# Patient Record
Sex: FEMALE | Race: WHITE | NOT HISPANIC OR LATINO | Employment: OTHER | URBAN - METROPOLITAN AREA
[De-identification: names, ages, dates, MRNs, and addresses within clinical notes are randomized per-mention and may not be internally consistent; named-entity substitution may affect disease eponyms.]

---

## 2017-01-20 ENCOUNTER — CLINICAL SUPPORT (OUTPATIENT)
Dept: ORTHOPEDIC SURGERY | Facility: CLINIC | Age: 82
End: 2017-01-20

## 2017-01-20 VITALS — BODY MASS INDEX: 29.73 KG/M2 | WEIGHT: 185 LBS | TEMPERATURE: 98.3 F | HEIGHT: 66 IN

## 2017-01-20 DIAGNOSIS — M19.90 ARTHRITIS: Primary | ICD-10-CM

## 2017-01-20 PROCEDURE — 20610 DRAIN/INJ JOINT/BURSA W/O US: CPT | Performed by: ORTHOPAEDIC SURGERY

## 2017-01-20 RX ORDER — METHYLPREDNISOLONE ACETATE 80 MG/ML
80 INJECTION, SUSPENSION INTRA-ARTICULAR; INTRALESIONAL; INTRAMUSCULAR; SOFT TISSUE
Status: COMPLETED | OUTPATIENT
Start: 2017-01-20 | End: 2017-01-20

## 2017-01-20 RX ORDER — BUPIVACAINE HYDROCHLORIDE 5 MG/ML
4 INJECTION, SOLUTION PERINEURAL
Status: COMPLETED | OUTPATIENT
Start: 2017-01-20 | End: 2017-01-20

## 2017-01-20 RX ADMIN — METHYLPREDNISOLONE ACETATE 80 MG: 80 INJECTION, SUSPENSION INTRA-ARTICULAR; INTRALESIONAL; INTRAMUSCULAR; SOFT TISSUE at 15:04

## 2017-01-20 RX ADMIN — BUPIVACAINE HYDROCHLORIDE 4 ML: 5 INJECTION, SOLUTION PERINEURAL at 15:05

## 2017-01-20 RX ADMIN — METHYLPREDNISOLONE ACETATE 80 MG: 80 INJECTION, SUSPENSION INTRA-ARTICULAR; INTRALESIONAL; INTRAMUSCULAR; SOFT TISSUE at 15:05

## 2017-01-20 RX ADMIN — BUPIVACAINE HYDROCHLORIDE 4 ML: 5 INJECTION, SOLUTION PERINEURAL at 15:04

## 2017-01-20 NOTE — MR AVS SNAPSHOT
Brandy Han   1/20/2017 2:30 PM   Clinical Support    Dept Phone:  [phone]   Encounter #:  [id number]    Provider:  Jeanna Buckner MD   Department:  [facility]                Your Full Care Plan              Today's Medication Changes          These changes are accurate as of: 1/20/17  3:16 PM.  If you have any questions, ask your nurse or doctor.               Medication(s)that have changed:     * digoxin 250 MCG tablet   Commonly known as:  LANOXIN   Take 1 tablet by mouth Daily.   What changed:  how much to take   Changed by:  Rupal Sutton MD       * DIGOX 250 MCG tablet   Generic drug:  digoxin   TAKE 1 TABLET BY MOUTH EVERY DAY   What changed:  Another medication with the same name was changed. Make sure you understand how and when to take each.   Changed by:  Rupal Sutton MD       metoprolol succinate XL 50 MG 24 hr tablet   Commonly known as:  TOPROL-XL   Take 1 tablet by mouth Daily.   What changed:  Another medication with the same name was removed. Continue taking this medication, and follow the directions you see here.   Changed by:  Rupal Sutton MD       * Notice:  This list has 2 medication(s) that are the same as other medications prescribed for you. Read the directions carefully, and ask your doctor or other care provider to review them with you.               Your Updated Medication List          This list is accurate as of: 1/20/17  3:16 PM.  Always use your most recent med list.                atorvastatin 10 MG tablet   Commonly known as:  LIPITOR   Take 1 tablet by mouth Daily.       * digoxin 250 MCG tablet   Commonly known as:  LANOXIN   Take 1 tablet by mouth Daily.       * DIGOX 250 MCG tablet   Generic drug:  digoxin   TAKE 1 TABLET BY MOUTH EVERY DAY       metoprolol succinate XL 50 MG 24 hr tablet   Commonly known as:  TOPROL-XL   Take 1 tablet by mouth Daily.       triamcinolone 0.1 %  ointment   Commonly known as:  KENALOG       warfarin 5 MG tablet   Commonly known as:  COUMADIN   Take 1 tablet by mouth Daily.       * Notice:  This list has 2 medication(s) that are the same as other medications prescribed for you. Read the directions carefully, and ask your doctor or other care provider to review them with you.            We Performed the Following     Large Joint Arthrocentesis     Large Joint Arthrocentesis       Medications to be Given to You by a Medical Professional     Due       Frequency    2016 cyanocobalamin injection 1,000 mcg  Every 28 Days      Instructions     None    Patient Instructions History      Upcoming Appointments     Visit Type Date Time Department    INJECTION 2017  2:30 PM [facility]    INJECTION 2017  3:30 PM [facility]      [employer] Signup     [employer] allows you to send messages to your doctor, view your test results, renew your prescriptions, schedule appointments, and more. To sign up, go to [employer] and click on the Sign Up Now link in the New User? box. Enter your [employer] Activation Code exactly as it appears below along with the last four digits of your Social Security Number and your Date of Birth () to complete the sign-up process. If you do not sign up before the expiration date, you must request a new code.    Gig[employer] Activation Code: [email or web address]  Expires: 2/3/2017  3:16 PM    If you have questions, you can email [email or web address] or call [phone] to talk to our [employer] staff. Remember, [employer]lete is NOT to be used for urgent needs. For medical emergencies, dial 911.               Other Info from Your Visit           Your Appointments     2017  3:30 PM EDT   Injection with Jeanna Buckner MD   [facility] [place] [facility] (--)    [address] [place] [address]   [phone]              Allergies     No Known Allergies     "  Reason for Visit     Left Knee - Follow-up     Right Knee - Follow-up           Vital Signs     Temperature Height Weight Body Mass Index Smoking Status       98.3 °F (36.8 °C) 66\" (167.6 cm) 185 lb (83.9 kg) 29.86 kg/m2 Never Smoker         "

## 2017-01-20 NOTE — PROGRESS NOTES
"Bilateral Knee Joint Injections      Patient: Brandy Han        YOB: 1930            Chief Complaints: Knee pain      History of Present Illness: Pt gets intermittent  injections with good relief. Is here for repeat injection. Understands options.      Physical Exam: 86 y.o. female  General Appearance:    Alert, cooperative, in no acute distress                   Vitals:    01/20/17 1500   Temp: 98.3 °F (36.8 °C)   Weight: 185 lb (83.9 kg)   Height: 66\" (167.6 cm)      Patient is alert and read ×3 no acute distress appears her above-listed at height weight and age.  Affect is normal respiratory rate is normal unlabored. Heart rate regular rate rhythm, sclera, dentition and hearing are normal for the purpose of this exam.  Exam and complaints are unchanged.      Procedure:  Large Joint Arthrocentesis  Date/Time: 1/20/2017 3:04 PM  Consent given by: patient  Site marked: site marked  Supporting Documentation  Indications: pain   Procedure Details  Location: knee - R knee  Needle size: 25 G  Medications administered: 4 mL bupivacaine; 80 mg methylPREDNISolone acetate 80 MG/ML      Large Joint Arthrocentesis  Date/Time: 1/20/2017 3:05 PM  Consent given by: patient  Site marked: site marked  Supporting Documentation  Indications: pain   Procedure Details  Location: knee - L knee  Needle size: 25 G  Medications administered: 4 mL bupivacaine; 80 mg methylPREDNISolone acetate 80 MG/ML  Patient tolerance: patient tolerated the procedure well with no immediate complications                Assessment. Persistent knee pain      Plan: Is to proceed with injection    The knee joint was injected under strict sterile technique with Marcaine and Depo-Medrol this was done sterilely and tolerated tolerated well.            "

## 2017-02-16 ENCOUNTER — ANTICOAGULATION VISIT (OUTPATIENT)
Dept: INTERNAL MEDICINE | Facility: CLINIC | Age: 82
End: 2017-02-16

## 2017-02-16 DIAGNOSIS — I48.91 ATRIAL FIBRILLATION, UNSPECIFIED TYPE (HCC): Primary | ICD-10-CM

## 2017-02-16 LAB — INR PPP: 1.4 (ref 0.9–1.1)

## 2017-02-16 PROCEDURE — 85610 PROTHROMBIN TIME: CPT | Performed by: INTERNAL MEDICINE

## 2017-02-16 PROCEDURE — 36416 COLLJ CAPILLARY BLOOD SPEC: CPT | Performed by: INTERNAL MEDICINE

## 2017-02-24 ENCOUNTER — ANTICOAGULATION VISIT (OUTPATIENT)
Dept: INTERNAL MEDICINE | Facility: CLINIC | Age: 82
End: 2017-02-24

## 2017-02-24 DIAGNOSIS — I48.91 ATRIAL FIBRILLATION, UNSPECIFIED TYPE (HCC): ICD-10-CM

## 2017-02-24 LAB — INR PPP: 1.9 (ref 0.9–1.1)

## 2017-02-24 PROCEDURE — 85610 PROTHROMBIN TIME: CPT | Performed by: INTERNAL MEDICINE

## 2017-02-24 PROCEDURE — 36416 COLLJ CAPILLARY BLOOD SPEC: CPT | Performed by: INTERNAL MEDICINE

## 2017-03-07 ENCOUNTER — TELEPHONE (OUTPATIENT)
Dept: ORTHOPEDIC SURGERY | Facility: CLINIC | Age: 82
End: 2017-03-07

## 2017-03-27 RX ORDER — DIGOXIN 250 UG/1
TABLET ORAL
Qty: 90 TABLET | Refills: 0 | Status: SHIPPED | OUTPATIENT
Start: 2017-03-27 | End: 2018-03-12 | Stop reason: SDUPTHER

## 2017-03-31 ENCOUNTER — ANTICOAGULATION VISIT (OUTPATIENT)
Dept: INTERNAL MEDICINE | Facility: CLINIC | Age: 82
End: 2017-03-31

## 2017-03-31 DIAGNOSIS — I48.91 ATRIAL FIBRILLATION, UNSPECIFIED TYPE (HCC): ICD-10-CM

## 2017-03-31 LAB — INR PPP: 2.3 (ref 0.9–1.1)

## 2017-03-31 PROCEDURE — 36416 COLLJ CAPILLARY BLOOD SPEC: CPT | Performed by: INTERNAL MEDICINE

## 2017-03-31 PROCEDURE — 85610 PROTHROMBIN TIME: CPT | Performed by: INTERNAL MEDICINE

## 2017-04-25 ENCOUNTER — CLINICAL SUPPORT (OUTPATIENT)
Dept: ORTHOPEDIC SURGERY | Facility: CLINIC | Age: 82
End: 2017-04-25

## 2017-04-25 VITALS — WEIGHT: 185 LBS | HEIGHT: 66 IN | BODY MASS INDEX: 29.73 KG/M2

## 2017-04-25 DIAGNOSIS — G89.29 CHRONIC PAIN OF BOTH KNEES: Primary | ICD-10-CM

## 2017-04-25 DIAGNOSIS — M25.562 CHRONIC PAIN OF BOTH KNEES: Primary | ICD-10-CM

## 2017-04-25 DIAGNOSIS — M25.561 CHRONIC PAIN OF BOTH KNEES: Primary | ICD-10-CM

## 2017-04-25 PROCEDURE — 73562 X-RAY EXAM OF KNEE 3: CPT | Performed by: ORTHOPAEDIC SURGERY

## 2017-04-25 PROCEDURE — 20610 DRAIN/INJ JOINT/BURSA W/O US: CPT | Performed by: ORTHOPAEDIC SURGERY

## 2017-04-25 RX ORDER — METHYLPREDNISOLONE ACETATE 80 MG/ML
80 INJECTION, SUSPENSION INTRA-ARTICULAR; INTRALESIONAL; INTRAMUSCULAR; SOFT TISSUE
Status: COMPLETED | OUTPATIENT
Start: 2017-04-25 | End: 2017-04-25

## 2017-04-25 RX ORDER — BUPIVACAINE HYDROCHLORIDE 5 MG/ML
4 INJECTION, SOLUTION EPIDURAL; INTRACAUDAL
Status: COMPLETED | OUTPATIENT
Start: 2017-04-25 | End: 2017-04-25

## 2017-04-25 RX ADMIN — METHYLPREDNISOLONE ACETATE 80 MG: 80 INJECTION, SUSPENSION INTRA-ARTICULAR; INTRALESIONAL; INTRAMUSCULAR; SOFT TISSUE at 16:02

## 2017-04-25 RX ADMIN — BUPIVACAINE HYDROCHLORIDE 4 ML: 5 INJECTION, SOLUTION EPIDURAL; INTRACAUDAL at 16:02

## 2017-04-25 NOTE — PROGRESS NOTES
"Right Knee Joint Injection      Patient: Brandy Han        YOB: 1930            Chief Complaints: Right Knee pain  Chief Complaint   Patient presents with   • Left Knee - Follow-up   • Right Knee - Follow-up           History of Present Illness: Pt gets intermittent  injections with good relief. Is here for repeat injection. Understands options.She's miserable and wants to know if there any other steps we can take      Physical Exam: 86 y.o. female  General Appearance:    Alert, cooperative, in no acute distress                   Vitals:    04/25/17 1600   Weight: 185 lb (83.9 kg)   Height: 66\" (167.6 cm)      Patient is alert and read ×3 no acute distress appears her above-listed at height weight and age.  Affect is normal respiratory rate is normal unlabored. Heart rate regular rate rhythm, sclera, dentition and hearing are normal for the purpose of this exam.  Exam and complaints are unchanged.  Physical exam of the right  knee reveals no effusion no redness.  The patient does have tenderness about the medial l joint line.  No tenderness about the lateral l joint line.  A negative bounce home and a positive l medial Shirlene.    Patient has a stable ligamentous exam.  The patient has a negative Lachman and negative anterior drawer and a negative pivot shift.  Quads are reasonable and symmetric bilaterally.  Calf is soft and nontender.  There is no overlying skin changes no lymphedema lymphadenopathy.  Patient has good hip range of motion full symmetric and asymptomatic and a normal ankle exam.  She has good distal pulses and sensation distally is intact        Procedure:  Large Joint Arthrocentesis  Date/Time: 4/25/2017 4:02 PM  Consent given by: patient  Site marked: site marked  Timeout: Immediately prior to procedure a time out was called to verify the correct patient, procedure, equipment, support staff and site/side marked as required   Supporting Documentation  Indications: pain   Procedure " Details  Location: knee - R knee  Needle size: 25 G  Approach: anteromedial  Medications administered: 4 mL bupivacaine (PF) (MARCAINE) 0.5 % injection 4 mL; 80 mg methylPREDNISolone acetate (DEPO-medrol) injection 80 mg        X-rays AP lateral merchant view of both knees were taken to evaluate her symptoms and compared to previous films.  She has mild narrowing of her medial compartment on the left she has mild patellofemoral on OA on the left and severe patellofemoral OA on the right          Assessment. Persistent knee pain she has a lot of patellofemoral away I'm just concerned that were missing something here plan is to proceed with an MRI and have her call me after that test.  I will proceed with a knee injection as well today  Plan: Is to proceed with injection    The knee joint was injected under strict sterile technique with Marcaine and Depo-Medrol this was done sterilely and tolerated tolerated well.

## 2017-04-26 ENCOUNTER — TELEPHONE (OUTPATIENT)
Dept: ORTHOPEDIC SURGERY | Facility: CLINIC | Age: 82
End: 2017-04-26

## 2017-04-26 DIAGNOSIS — M25.561 CHRONIC PAIN OF RIGHT KNEE: Primary | ICD-10-CM

## 2017-04-26 DIAGNOSIS — G89.29 CHRONIC PAIN OF RIGHT KNEE: Primary | ICD-10-CM

## 2017-04-28 ENCOUNTER — ANTICOAGULATION VISIT (OUTPATIENT)
Dept: INTERNAL MEDICINE | Facility: CLINIC | Age: 82
End: 2017-04-28

## 2017-04-28 DIAGNOSIS — I48.91 ATRIAL FIBRILLATION, UNSPECIFIED TYPE (HCC): Primary | ICD-10-CM

## 2017-04-28 LAB — INR PPP: 2.2 (ref 0.9–1.1)

## 2017-04-28 PROCEDURE — 85610 PROTHROMBIN TIME: CPT | Performed by: INTERNAL MEDICINE

## 2017-04-28 PROCEDURE — 36416 COLLJ CAPILLARY BLOOD SPEC: CPT | Performed by: INTERNAL MEDICINE

## 2017-05-05 ENCOUNTER — HOSPITAL ENCOUNTER (OUTPATIENT)
Dept: GENERAL RADIOLOGY | Facility: HOSPITAL | Age: 82
Discharge: HOME | End: 2017-05-05
Attending: ORTHOPAEDIC SURGERY | Admitting: ORTHOPAEDIC SURGERY

## 2017-05-05 ENCOUNTER — HOSPITAL ENCOUNTER (OUTPATIENT)
Dept: CT IMAGING | Facility: HOSPITAL | Age: 82
Discharge: HOME | End: 2017-05-05
Attending: ORTHOPAEDIC SURGERY

## 2017-05-05 DIAGNOSIS — M25.561 CHRONIC PAIN OF RIGHT KNEE: ICD-10-CM

## 2017-05-05 DIAGNOSIS — G89.29 CHRONIC PAIN OF RIGHT KNEE: ICD-10-CM

## 2017-05-05 PROCEDURE — 73700 CT LOWER EXTREMITY W/O DYE: CPT

## 2017-05-05 PROCEDURE — 77002 NEEDLE LOCALIZATION BY XRAY: CPT

## 2017-05-05 PROCEDURE — 0 IOPAMIDOL 61 % SOLUTION: Performed by: ORTHOPAEDIC SURGERY

## 2017-05-05 RX ORDER — LIDOCAINE HYDROCHLORIDE 10 MG/ML
10 INJECTION, SOLUTION INFILTRATION; PERINEURAL ONCE
Status: COMPLETED | OUTPATIENT
Start: 2017-05-05 | End: 2017-05-05

## 2017-05-05 RX ADMIN — LIDOCAINE HYDROCHLORIDE 6 ML: 10 INJECTION, SOLUTION INFILTRATION; PERINEURAL at 14:39

## 2017-05-05 RX ADMIN — IOPAMIDOL 20 ML: 612 INJECTION, SOLUTION INTRAVENOUS at 14:39

## 2017-06-23 ENCOUNTER — ANTICOAGULATION VISIT (OUTPATIENT)
Dept: INTERNAL MEDICINE | Facility: CLINIC | Age: 82
End: 2017-06-23

## 2017-06-23 ENCOUNTER — OFFICE VISIT (OUTPATIENT)
Dept: INTERNAL MEDICINE | Facility: CLINIC | Age: 82
End: 2017-06-23

## 2017-06-23 VITALS
OXYGEN SATURATION: 92 % | WEIGHT: 190 LBS | BODY MASS INDEX: 30.53 KG/M2 | HEART RATE: 94 BPM | DIASTOLIC BLOOD PRESSURE: 78 MMHG | HEIGHT: 66 IN | SYSTOLIC BLOOD PRESSURE: 132 MMHG

## 2017-06-23 DIAGNOSIS — I48.91 ATRIAL FIBRILLATION, UNSPECIFIED TYPE (HCC): ICD-10-CM

## 2017-06-23 DIAGNOSIS — E78.5 HYPERLIPIDEMIA, UNSPECIFIED HYPERLIPIDEMIA TYPE: Primary | ICD-10-CM

## 2017-06-23 DIAGNOSIS — J20.9 ACUTE BRONCHITIS, UNSPECIFIED ORGANISM: ICD-10-CM

## 2017-06-23 DIAGNOSIS — I73.9 PERIPHERAL VASCULAR DISEASE (HCC): ICD-10-CM

## 2017-06-23 DIAGNOSIS — E53.8 B12 DEFICIENCY: ICD-10-CM

## 2017-06-23 LAB — INR PPP: 3.1 (ref 0.9–1.1)

## 2017-06-23 PROCEDURE — 36416 COLLJ CAPILLARY BLOOD SPEC: CPT | Performed by: INTERNAL MEDICINE

## 2017-06-23 PROCEDURE — 96372 THER/PROPH/DIAG INJ SC/IM: CPT | Performed by: INTERNAL MEDICINE

## 2017-06-23 PROCEDURE — 85610 PROTHROMBIN TIME: CPT | Performed by: INTERNAL MEDICINE

## 2017-06-23 PROCEDURE — 99214 OFFICE O/P EST MOD 30 MIN: CPT | Performed by: INTERNAL MEDICINE

## 2017-06-23 RX ORDER — AMOXICILLIN AND CLAVULANATE POTASSIUM 875; 125 MG/1; MG/1
1 TABLET, FILM COATED ORAL 2 TIMES DAILY
Qty: 20 TABLET | Refills: 0 | Status: SHIPPED | OUTPATIENT
Start: 2017-06-23 | End: 2017-07-03

## 2017-06-23 RX ORDER — TRIAMCINOLONE ACETONIDE 1 MG/G
CREAM TOPICAL
Refills: 2 | COMMUNITY
Start: 2017-05-17 | End: 2020-08-21

## 2017-06-23 RX ADMIN — CYANOCOBALAMIN 1000 MCG: 1000 INJECTION, SOLUTION INTRAMUSCULAR; SUBCUTANEOUS at 16:55

## 2017-06-23 NOTE — PROGRESS NOTES
Subjective     Brandy Han is a 87 y.o. female who presents with   Chief Complaint   Patient presents with   • Hyperlipidemia   • Generalized Body Aches       History of Present Illness     Just started with a cough.  No fever.  Going on a week.    She is having a lot of joint pain and back pain.  Right knee.  Her low back bothers her.  Her shoulder ache as well.  She is followed by Dr. Buckner and Dr. Pride for her joints.  Discussed taking tylenol regularly up to TID.    Due for INR.  She is overdue.    Due for B12 shot.  She is overdue.    HLD.  Due for check of labs.   H/o PVD with one totally occluded carotid.      Review of Systems    The following portions of the patient's history were reviewed and updated as appropriate: allergies, current medications and problem list.    Patient Active Problem List    Diagnosis Date Noted   • Chronic pain of both knees 04/25/2017   • Arthritis 01/20/2017   • B12 deficiency 11/04/2016   • Sinus arrest 10/04/2016     Note Last Updated: 10/4/2016     Overview:   per Cardionet Monitor July 2013; s/p pacemaker     • Presence of cardiac pacemaker 10/04/2016   • Right shoulder pain 07/15/2016   • H/O fall 07/15/2016   • Lumbar spine pain 07/15/2016   • Glenohumeral arthritis 07/15/2016   • Right-sided low back pain with right-sided sciatica 07/15/2016   • Venous thrombosis 06/14/2016   • Peripheral vascular disease 06/14/2016     Note Last Updated: 6/14/2016     Description: total occlusion of the SOLO and stenosis of the LICA.     • Osteoporosis 06/14/2016     Note Last Updated: 6/14/2016     Description: s/p 10 years of Fosamax.     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 90 tablet 3   • DIGOX 250 MCG tablet TAKE 1 TABLET BY MOUTH EVERY DAY 90 tablet 0   • metoprolol succinate XL (TOPROL-XL) 50 MG 24 hr tablet Take 1 tablet by mouth Daily. 90 tablet 3   • warfarin (COUMADIN) 5 MG tablet Take 1 tablet by mouth Daily. 90 tablet 3   • [DISCONTINUED] digoxin (LANOXIN) 250 MCG tablet Take 1 tablet by mouth Daily. (Patient taking differently: Take 0.25 mcg by mouth Daily.) 90 tablet 3   • [DISCONTINUED] triamcinolone (KENALOG) 0.1 % ointment        Current Facility-Administered Medications on File Prior to Visit   Medication Dose Route Frequency Provider Last Rate Last Dose   • cyanocobalamin injection 1,000 mcg  1,000 mcg Intramuscular Q28 Days Rupal Sutton MD   1,000 mcg at 06/23/17 1655       Objective     /78  Pulse 94  Ht 66\" (167.6 cm)  Wt 190 lb (86.2 kg)  SpO2 92%  BMI 30.67 kg/m2    Physical Exam   Constitutional: She is oriented to person, place, and time. She appears well-developed and well-nourished.   HENT:   Head: Normocephalic and atraumatic.   Right Ear: Hearing and tympanic membrane normal.   Left Ear: Hearing and tympanic membrane normal.   Mouth/Throat: No oropharyngeal exudate or posterior oropharyngeal erythema.   Cardiovascular: Normal rate, regular rhythm and normal heart sounds.    Pulmonary/Chest: Effort normal and breath sounds normal.   Neurological: She is alert and oriented to person, place, and time.   Skin: Skin is warm and dry.   Psychiatric: She has a normal mood and affect. Her behavior is normal.     X-rays of the chest  performed today for following indication:   cough.  X-ray reveal nad.  There is no available x-ray for comparison.  X-ray sent to radiology for official interpretation and findings.        Assessment/Plan   Brandy was seen today for hyperlipidemia and generalized body aches.    Diagnoses and all orders for this visit:    Hyperlipidemia, unspecified hyperlipidemia type  -     CBC & Differential  -     " Comprehensive Metabolic Panel  -     Lipid Panel  -     TSH Rfx On Abnormal To Free T4  -     Vitamin B12    B12 deficiency  -     CBC & Differential  -     Comprehensive Metabolic Panel  -     Lipid Panel  -     TSH Rfx On Abnormal To Free T4  -     Vitamin B12    Atrial fibrillation, unspecified type  -     CBC & Differential  -     Comprehensive Metabolic Panel  -     Lipid Panel  -     TSH Rfx On Abnormal To Free T4  -     Vitamin B12  -     POC INR    Acute bronchitis, unspecified organism  -     CBC & Differential  -     Comprehensive Metabolic Panel  -     Lipid Panel  -     TSH Rfx On Abnormal To Free T4  -     Vitamin B12  -     XR Chest PA & Lateral    Peripheral vascular disease  -     CBC & Differential  -     Comprehensive Metabolic Panel  -     Lipid Panel  -     TSH Rfx On Abnormal To Free T4  -     Vitamin B12    Other orders  -     amoxicillin-clavulanate (AUGMENTIN) 875-125 MG per tablet; Take 1 tablet by mouth 2 (Two) Times a Day for 10 days.        Discussion  HLD/PVD.  Continue current and check labs.   Afib.  See flowsheet.   B12 deficiency.  Shot today.    Patient presents with episodes of acute bronchitis.  A prescription for antibiotics is provided today.  The patient is instructed to take along with Mucinex DM.  Let me know they are not feeling better over the next 3 days or if there is any change in symptoms.               Future Appointments  Date Time Provider Department Center   7/28/2017 2:30 PM [facility] None   9/25/2017 3:15 PM Rupal Sutton MD [facility] None

## 2017-06-24 LAB
ALBUMIN SERPL-MCNC: 4.4 G/DL (ref 3.5–5.2)
ALBUMIN/GLOB SERPL: 1.5 G/DL
ALP SERPL-CCNC: 88 U/L (ref 39–117)
ALT SERPL-CCNC: 10 U/L (ref 1–33)
AST SERPL-CCNC: 15 U/L (ref 1–32)
BASOPHILS # BLD AUTO: 0.02 10*3/MM3 (ref 0–0.2)
BASOPHILS NFR BLD AUTO: 0.2 % (ref 0–1.5)
BILIRUB SERPL-MCNC: 1.2 MG/DL (ref 0.1–1.2)
BUN SERPL-MCNC: 13 MG/DL (ref 8–23)
BUN/CREAT SERPL: 13 (ref 7–25)
CALCIUM SERPL-MCNC: 9 MG/DL (ref 8.6–10.5)
CHLORIDE SERPL-SCNC: 100 MMOL/L (ref 98–107)
CHOLEST SERPL-MCNC: 147 MG/DL (ref 0–200)
CO2 SERPL-SCNC: 23.8 MMOL/L (ref 22–29)
CREAT SERPL-MCNC: 1 MG/DL (ref 0.57–1)
EOSINOPHIL # BLD AUTO: 0.11 10*3/MM3 (ref 0–0.7)
EOSINOPHIL NFR BLD AUTO: 1.1 % (ref 0.3–6.2)
ERYTHROCYTE [DISTWIDTH] IN BLOOD BY AUTOMATED COUNT: 12.6 % (ref 11.7–13)
GLOBULIN SER CALC-MCNC: 2.9 GM/DL
GLUCOSE SERPL-MCNC: 122 MG/DL (ref 65–99)
HCT VFR BLD AUTO: 44.7 % (ref 35.6–45.5)
HDLC SERPL-MCNC: 74 MG/DL (ref 40–60)
HGB BLD-MCNC: 14.7 G/DL (ref 11.9–15.5)
IMM GRANULOCYTES # BLD: 0.02 10*3/MM3 (ref 0–0.03)
IMM GRANULOCYTES NFR BLD: 0.2 % (ref 0–0.5)
LDLC SERPL CALC-MCNC: 52 MG/DL (ref 0–100)
LYMPHOCYTES # BLD AUTO: 2.08 10*3/MM3 (ref 0.9–4.8)
LYMPHOCYTES NFR BLD AUTO: 20.2 % (ref 19.6–45.3)
MCH RBC QN AUTO: 32.6 PG (ref 26.9–32)
MCHC RBC AUTO-ENTMCNC: 32.9 G/DL (ref 32.4–36.3)
MCV RBC AUTO: 99.1 FL (ref 80.5–98.2)
MONOCYTES # BLD AUTO: 1.14 10*3/MM3 (ref 0.2–1.2)
MONOCYTES NFR BLD AUTO: 11.1 % (ref 5–12)
NEUTROPHILS # BLD AUTO: 6.93 10*3/MM3 (ref 1.9–8.1)
NEUTROPHILS NFR BLD AUTO: 67.2 % (ref 42.7–76)
PLATELET # BLD AUTO: 183 10*3/MM3 (ref 140–500)
POTASSIUM SERPL-SCNC: 4 MMOL/L (ref 3.5–5.2)
PROT SERPL-MCNC: 7.3 G/DL (ref 6–8.5)
RBC # BLD AUTO: 4.51 10*6/MM3 (ref 3.9–5.2)
SODIUM SERPL-SCNC: 140 MMOL/L (ref 136–145)
TRIGL SERPL-MCNC: 106 MG/DL (ref 0–150)
TSH SERPL DL<=0.005 MIU/L-ACNC: 1.09 MIU/ML (ref 0.27–4.2)
VIT B12 SERPL-MCNC: 234 PG/ML (ref 211–946)
VLDLC SERPL CALC-MCNC: 21.2 MG/DL (ref 5–40)
WBC # BLD AUTO: 10.3 10*3/MM3 (ref 4.5–10.7)

## 2017-06-26 ENCOUNTER — TELEPHONE (OUTPATIENT)
Dept: INTERNAL MEDICINE | Facility: CLINIC | Age: 82
End: 2017-06-26

## 2017-06-26 RX ORDER — ATORVASTATIN CALCIUM 20 MG/1
20 TABLET, FILM COATED ORAL DAILY
Qty: 90 TABLET | Refills: 3 | Status: SHIPPED | OUTPATIENT
Start: 2017-06-26 | End: 2017-11-27 | Stop reason: SDUPTHER

## 2017-06-26 RX ORDER — BENZONATATE 200 MG/1
200 CAPSULE ORAL 3 TIMES DAILY PRN
Qty: 30 CAPSULE | Refills: 0 | Status: SHIPPED | OUTPATIENT
Start: 2017-06-26 | End: 2017-11-27

## 2017-06-26 NOTE — TELEPHONE ENCOUNTER
Has a bad cough robitussin isn't working can you call in a script. CC    I called in Phillip Miranda.  Let me know if that is not helpful.  HARRYW

## 2017-07-28 ENCOUNTER — ANTICOAGULATION VISIT (OUTPATIENT)
Dept: INTERNAL MEDICINE | Facility: CLINIC | Age: 82
End: 2017-07-28

## 2017-07-28 ENCOUNTER — LAB (OUTPATIENT)
Dept: INTERNAL MEDICINE | Facility: CLINIC | Age: 82
End: 2017-07-28

## 2017-07-28 DIAGNOSIS — I48.91 ATRIAL FIBRILLATION, UNSPECIFIED TYPE (HCC): ICD-10-CM

## 2017-07-28 LAB — INR PPP: 4.2 (ref 0.9–1.1)

## 2017-07-28 PROCEDURE — 96372 THER/PROPH/DIAG INJ SC/IM: CPT | Performed by: INTERNAL MEDICINE

## 2017-07-28 PROCEDURE — 85610 PROTHROMBIN TIME: CPT | Performed by: INTERNAL MEDICINE

## 2017-07-28 PROCEDURE — 36416 COLLJ CAPILLARY BLOOD SPEC: CPT | Performed by: INTERNAL MEDICINE

## 2017-07-28 RX ADMIN — CYANOCOBALAMIN 1000 MCG: 1000 INJECTION, SOLUTION INTRAMUSCULAR; SUBCUTANEOUS at 14:37

## 2017-08-11 ENCOUNTER — ANTICOAGULATION VISIT (OUTPATIENT)
Dept: INTERNAL MEDICINE | Facility: CLINIC | Age: 82
End: 2017-08-11

## 2017-08-11 DIAGNOSIS — I48.91 ATRIAL FIBRILLATION, UNSPECIFIED TYPE (HCC): ICD-10-CM

## 2017-08-11 LAB — INR PPP: 2.5 (ref 0.9–1.1)

## 2017-08-11 PROCEDURE — 36416 COLLJ CAPILLARY BLOOD SPEC: CPT | Performed by: INTERNAL MEDICINE

## 2017-08-11 PROCEDURE — 85610 PROTHROMBIN TIME: CPT | Performed by: INTERNAL MEDICINE

## 2017-08-22 ENCOUNTER — CLINICAL SUPPORT (OUTPATIENT)
Dept: ORTHOPEDIC SURGERY | Facility: CLINIC | Age: 82
End: 2017-08-22

## 2017-08-22 VITALS — HEIGHT: 66 IN | TEMPERATURE: 98.2 F

## 2017-08-22 DIAGNOSIS — M19.90 ARTHRITIS: Primary | ICD-10-CM

## 2017-08-22 PROCEDURE — 20610 DRAIN/INJ JOINT/BURSA W/O US: CPT | Performed by: ORTHOPAEDIC SURGERY

## 2017-08-22 RX ORDER — BUPIVACAINE HYDROCHLORIDE 5 MG/ML
4 INJECTION, SOLUTION EPIDURAL; INTRACAUDAL
Status: COMPLETED | OUTPATIENT
Start: 2017-08-22 | End: 2017-08-22

## 2017-08-22 RX ORDER — METHYLPREDNISOLONE ACETATE 80 MG/ML
80 INJECTION, SUSPENSION INTRA-ARTICULAR; INTRALESIONAL; INTRAMUSCULAR; SOFT TISSUE
Status: COMPLETED | OUTPATIENT
Start: 2017-08-22 | End: 2017-08-22

## 2017-08-22 RX ADMIN — BUPIVACAINE HYDROCHLORIDE 4 ML: 5 INJECTION, SOLUTION EPIDURAL; INTRACAUDAL at 13:17

## 2017-08-22 RX ADMIN — METHYLPREDNISOLONE ACETATE 80 MG: 80 INJECTION, SUSPENSION INTRA-ARTICULAR; INTRALESIONAL; INTRAMUSCULAR; SOFT TISSUE at 13:17

## 2017-08-22 NOTE — PROGRESS NOTES
"Bilateral Knee Joint Injection      Patient: Brandy Han        YOB: 1930            Chief Complaints: Bilateral Knee pain  Chief Complaint   Patient presents with   • Left Knee - Follow-up   • Right Knee - Follow-up           History of Present Illness: Pt gets intermittent  injections with good relief. Is here for repeat injection. Understands options.      Physical Exam: 87 y.o. female  General Appearance:    Alert, cooperative, in no acute distress                   Vitals:    08/22/17 1316   Temp: 98.2 °F (36.8 °C)   TempSrc: Temporal Artery    Height: 66\" (167.6 cm)      Patient is alert and read ×3 no acute distress appears her above-listed at height weight and age.  Affect is normal respiratory rate is normal unlabored. Heart rate regular rate rhythm, sclera, dentition and hearing are normal for the purpose of this exam.  Exam and complaints are unchanged.      Procedure:  Large Joint Arthrocentesis  Date/Time: 8/22/2017 1:17 PM  Consent given by: patient  Site marked: site marked  Timeout: Immediately prior to procedure a time out was called to verify the correct patient, procedure, equipment, support staff and site/side marked as required   Supporting Documentation  Indications: pain   Procedure Details  Location: knee - R knee  Preparation: Patient was prepped and draped in the usual sterile fashion  Needle size: 25 G  Approach: anteromedial  Medications administered: 80 mg methylPREDNISolone acetate 80 MG/ML; 4 mL bupivacaine (PF) 0.5 %      Large Joint Arthrocentesis  Date/Time: 8/22/2017 1:17 PM  Consent given by: patient  Site marked: site marked  Timeout: Immediately prior to procedure a time out was called to verify the correct patient, procedure, equipment, support staff and site/side marked as required   Supporting Documentation  Indications: pain   Procedure Details  Location: knee - L knee  Preparation: Patient was prepped and draped in the usual sterile fashion  Needle size: 25 " G  Approach: anteromedial  Medications administered: 4 mL bupivacaine (PF) 0.5 %; 80 mg methylPREDNISolone acetate 80 MG/ML                  Assessment. Persistent knee pain      Plan: Is to proceed with injection    The knee joint was injected under strict sterile technique with Marcaine and Depo-Medrol this was done sterilely and tolerated tolerated well.  She would like to start some physical therapy I will put the order in today

## 2017-08-25 ENCOUNTER — ANTICOAGULATION VISIT (OUTPATIENT)
Dept: INTERNAL MEDICINE | Facility: CLINIC | Age: 82
End: 2017-08-25

## 2017-08-25 DIAGNOSIS — I48.91 ATRIAL FIBRILLATION, UNSPECIFIED TYPE (HCC): ICD-10-CM

## 2017-08-25 LAB — INR PPP: 4 (ref 0.9–1.1)

## 2017-08-25 PROCEDURE — 96372 THER/PROPH/DIAG INJ SC/IM: CPT | Performed by: INTERNAL MEDICINE

## 2017-08-25 PROCEDURE — 36416 COLLJ CAPILLARY BLOOD SPEC: CPT | Performed by: INTERNAL MEDICINE

## 2017-08-25 PROCEDURE — 85610 PROTHROMBIN TIME: CPT | Performed by: INTERNAL MEDICINE

## 2017-08-25 RX ADMIN — CYANOCOBALAMIN 1000 MCG: 1000 INJECTION, SOLUTION INTRAMUSCULAR; SUBCUTANEOUS at 14:32

## 2017-09-06 ENCOUNTER — APPOINTMENT (OUTPATIENT)
Dept: PHYSICAL THERAPY | Facility: HOSPITAL | Age: 82
End: 2017-09-06
Attending: ORTHOPAEDIC SURGERY

## 2017-09-08 ENCOUNTER — ANTICOAGULATION VISIT (OUTPATIENT)
Dept: INTERNAL MEDICINE | Facility: CLINIC | Age: 82
End: 2017-09-08

## 2017-09-08 DIAGNOSIS — I48.91 ATRIAL FIBRILLATION, UNSPECIFIED TYPE (HCC): ICD-10-CM

## 2017-09-08 LAB — INR PPP: 1.6 (ref 0.9–1.1)

## 2017-09-08 PROCEDURE — 85610 PROTHROMBIN TIME: CPT | Performed by: INTERNAL MEDICINE

## 2017-09-08 PROCEDURE — 36416 COLLJ CAPILLARY BLOOD SPEC: CPT | Performed by: INTERNAL MEDICINE

## 2017-09-25 ENCOUNTER — ANTICOAGULATION VISIT (OUTPATIENT)
Dept: INTERNAL MEDICINE | Facility: CLINIC | Age: 82
End: 2017-09-25

## 2017-09-25 ENCOUNTER — OFFICE VISIT (OUTPATIENT)
Dept: INTERNAL MEDICINE | Facility: CLINIC | Age: 82
End: 2017-09-25

## 2017-09-25 VITALS
HEART RATE: 75 BPM | WEIGHT: 180 LBS | BODY MASS INDEX: 28.93 KG/M2 | DIASTOLIC BLOOD PRESSURE: 66 MMHG | SYSTOLIC BLOOD PRESSURE: 128 MMHG | RESPIRATION RATE: 18 BRPM | OXYGEN SATURATION: 96 % | HEIGHT: 66 IN

## 2017-09-25 DIAGNOSIS — E78.5 HYPERLIPIDEMIA, UNSPECIFIED HYPERLIPIDEMIA TYPE: ICD-10-CM

## 2017-09-25 DIAGNOSIS — I48.91 ATRIAL FIBRILLATION, UNSPECIFIED TYPE (HCC): ICD-10-CM

## 2017-09-25 DIAGNOSIS — N30.00 ACUTE CYSTITIS WITHOUT HEMATURIA: Primary | ICD-10-CM

## 2017-09-25 DIAGNOSIS — I73.9 PERIPHERAL VASCULAR DISEASE (HCC): ICD-10-CM

## 2017-09-25 DIAGNOSIS — I82.90 VENOUS THROMBOSIS: ICD-10-CM

## 2017-09-25 PROBLEM — M19.90 ARTHRITIS: Status: RESOLVED | Noted: 2017-01-20 | Resolved: 2017-09-25

## 2017-09-25 LAB — INR PPP: 1.3 (ref 0.9–1.1)

## 2017-09-25 PROCEDURE — 36416 COLLJ CAPILLARY BLOOD SPEC: CPT | Performed by: INTERNAL MEDICINE

## 2017-09-25 PROCEDURE — 99213 OFFICE O/P EST LOW 20 MIN: CPT | Performed by: INTERNAL MEDICINE

## 2017-09-25 PROCEDURE — 85610 PROTHROMBIN TIME: CPT | Performed by: INTERNAL MEDICINE

## 2017-09-25 RX ORDER — CEFDINIR 300 MG/1
300 CAPSULE ORAL 2 TIMES DAILY
Qty: 20 CAPSULE | Refills: 0 | Status: SHIPPED | OUTPATIENT
Start: 2017-09-25 | End: 2017-09-25 | Stop reason: SDUPTHER

## 2017-09-25 RX ORDER — CEFDINIR 300 MG/1
300 CAPSULE ORAL 2 TIMES DAILY
Qty: 14 CAPSULE | Refills: 0 | Status: SHIPPED | OUTPATIENT
Start: 2017-09-25 | End: 2017-11-27

## 2017-09-26 LAB
ALBUMIN SERPL-MCNC: 4.2 G/DL (ref 3.5–5.2)
ALBUMIN/GLOB SERPL: 1.8 G/DL
ALP SERPL-CCNC: 79 U/L (ref 39–117)
ALT SERPL-CCNC: 45 U/L (ref 1–33)
AST SERPL-CCNC: 33 U/L (ref 1–32)
BASOPHILS # BLD AUTO: 0.01 10*3/MM3 (ref 0–0.2)
BASOPHILS NFR BLD AUTO: 0.2 % (ref 0–1.5)
BILIRUB SERPL-MCNC: 0.9 MG/DL (ref 0.1–1.2)
BUN SERPL-MCNC: 20 MG/DL (ref 8–23)
BUN/CREAT SERPL: 21.1 (ref 7–25)
CALCIUM SERPL-MCNC: 9.1 MG/DL (ref 8.6–10.5)
CHLORIDE SERPL-SCNC: 103 MMOL/L (ref 98–107)
CHOLEST SERPL-MCNC: 149 MG/DL (ref 0–200)
CO2 SERPL-SCNC: 26.5 MMOL/L (ref 22–29)
CREAT SERPL-MCNC: 0.95 MG/DL (ref 0.57–1)
EOSINOPHIL # BLD AUTO: 0.11 10*3/MM3 (ref 0–0.7)
EOSINOPHIL NFR BLD AUTO: 2 % (ref 0.3–6.2)
ERYTHROCYTE [DISTWIDTH] IN BLOOD BY AUTOMATED COUNT: 13.6 % (ref 11.7–13)
GLOBULIN SER CALC-MCNC: 2.3 GM/DL
GLUCOSE SERPL-MCNC: 103 MG/DL (ref 65–99)
HCT VFR BLD AUTO: 45.6 % (ref 35.6–45.5)
HDLC SERPL-MCNC: 91 MG/DL (ref 40–60)
HGB BLD-MCNC: 14.3 G/DL (ref 11.9–15.5)
IMM GRANULOCYTES # BLD: 0.03 10*3/MM3 (ref 0–0.03)
IMM GRANULOCYTES NFR BLD: 0.5 % (ref 0–0.5)
LDLC SERPL CALC-MCNC: 41 MG/DL (ref 0–100)
LYMPHOCYTES # BLD AUTO: 1.94 10*3/MM3 (ref 0.9–4.8)
LYMPHOCYTES NFR BLD AUTO: 34.8 % (ref 19.6–45.3)
MCH RBC QN AUTO: 31.6 PG (ref 26.9–32)
MCHC RBC AUTO-ENTMCNC: 31.4 G/DL (ref 32.4–36.3)
MCV RBC AUTO: 100.7 FL (ref 80.5–98.2)
MONOCYTES # BLD AUTO: 0.54 10*3/MM3 (ref 0.2–1.2)
MONOCYTES NFR BLD AUTO: 9.7 % (ref 5–12)
NEUTROPHILS # BLD AUTO: 2.95 10*3/MM3 (ref 1.9–8.1)
NEUTROPHILS NFR BLD AUTO: 52.8 % (ref 42.7–76)
PLATELET # BLD AUTO: 184 10*3/MM3 (ref 140–500)
POTASSIUM SERPL-SCNC: 4.1 MMOL/L (ref 3.5–5.2)
PROT SERPL-MCNC: 6.5 G/DL (ref 6–8.5)
RBC # BLD AUTO: 4.53 10*6/MM3 (ref 3.9–5.2)
SODIUM SERPL-SCNC: 141 MMOL/L (ref 136–145)
TRIGL SERPL-MCNC: 86 MG/DL (ref 0–150)
VLDLC SERPL CALC-MCNC: 17.2 MG/DL (ref 5–40)
WBC # BLD AUTO: 5.58 10*3/MM3 (ref 4.5–10.7)

## 2017-09-29 ENCOUNTER — ANTICOAGULATION VISIT (OUTPATIENT)
Dept: INTERNAL MEDICINE | Facility: CLINIC | Age: 82
End: 2017-09-29

## 2017-09-29 DIAGNOSIS — I48.91 ATRIAL FIBRILLATION, UNSPECIFIED TYPE (HCC): ICD-10-CM

## 2017-09-29 LAB — INR PPP: 1.8 (ref 0.9–1.1)

## 2017-09-29 PROCEDURE — 85610 PROTHROMBIN TIME: CPT | Performed by: INTERNAL MEDICINE

## 2017-09-29 PROCEDURE — 36416 COLLJ CAPILLARY BLOOD SPEC: CPT | Performed by: INTERNAL MEDICINE

## 2017-10-06 ENCOUNTER — ANTICOAGULATION VISIT (OUTPATIENT)
Dept: INTERNAL MEDICINE | Facility: CLINIC | Age: 82
End: 2017-10-06

## 2017-10-06 DIAGNOSIS — I48.91 ATRIAL FIBRILLATION, UNSPECIFIED TYPE (HCC): ICD-10-CM

## 2017-10-06 LAB — INR PPP: 2.6 (ref 0.9–1.1)

## 2017-10-06 PROCEDURE — 36416 COLLJ CAPILLARY BLOOD SPEC: CPT | Performed by: INTERNAL MEDICINE

## 2017-10-06 PROCEDURE — 85610 PROTHROMBIN TIME: CPT | Performed by: INTERNAL MEDICINE

## 2017-10-06 RX ORDER — WARFARIN SODIUM 5 MG/1
TABLET ORAL
Qty: 90 TABLET | Refills: 0 | Status: SHIPPED | OUTPATIENT
Start: 2017-10-06 | End: 2018-01-01 | Stop reason: SDUPTHER

## 2017-10-13 ENCOUNTER — ANTICOAGULATION VISIT (OUTPATIENT)
Dept: INTERNAL MEDICINE | Facility: CLINIC | Age: 82
End: 2017-10-13

## 2017-10-13 DIAGNOSIS — I48.91 ATRIAL FIBRILLATION, UNSPECIFIED TYPE (HCC): ICD-10-CM

## 2017-10-13 LAB — INR PPP: 2.5 (ref 0.9–1.1)

## 2017-10-13 PROCEDURE — 85610 PROTHROMBIN TIME: CPT | Performed by: INTERNAL MEDICINE

## 2017-10-13 PROCEDURE — 36416 COLLJ CAPILLARY BLOOD SPEC: CPT | Performed by: INTERNAL MEDICINE

## 2017-10-16 RX ORDER — ATORVASTATIN CALCIUM 10 MG/1
TABLET, FILM COATED ORAL
Qty: 90 TABLET | Refills: 0 | Status: SHIPPED | OUTPATIENT
Start: 2017-10-16 | End: 2018-02-10 | Stop reason: SDUPTHER

## 2017-11-03 ENCOUNTER — ANTICOAGULATION VISIT (OUTPATIENT)
Dept: INTERNAL MEDICINE | Facility: CLINIC | Age: 82
End: 2017-11-03

## 2017-11-03 DIAGNOSIS — I48.91 ATRIAL FIBRILLATION, UNSPECIFIED TYPE (HCC): ICD-10-CM

## 2017-11-03 LAB — INR PPP: 2.1 (ref 0.9–1.1)

## 2017-11-03 PROCEDURE — 85610 PROTHROMBIN TIME: CPT | Performed by: INTERNAL MEDICINE

## 2017-11-03 PROCEDURE — 36416 COLLJ CAPILLARY BLOOD SPEC: CPT | Performed by: INTERNAL MEDICINE

## 2017-11-27 ENCOUNTER — CLINICAL SUPPORT (OUTPATIENT)
Dept: ORTHOPEDIC SURGERY | Facility: CLINIC | Age: 82
End: 2017-11-27

## 2017-11-27 VITALS — TEMPERATURE: 98.2 F | BODY MASS INDEX: 28.93 KG/M2 | HEIGHT: 66 IN | WEIGHT: 180 LBS

## 2017-11-27 DIAGNOSIS — M17.10 PRIMARY LOCALIZED OSTEOARTHROSIS OF LOWER LEG, UNSPECIFIED LATERALITY: ICD-10-CM

## 2017-11-27 DIAGNOSIS — R52 PAIN: Primary | ICD-10-CM

## 2017-11-27 PROCEDURE — 20610 DRAIN/INJ JOINT/BURSA W/O US: CPT | Performed by: ORTHOPAEDIC SURGERY

## 2017-11-27 RX ORDER — BUPIVACAINE HYDROCHLORIDE 5 MG/ML
4 INJECTION, SOLUTION EPIDURAL; INTRACAUDAL
Status: COMPLETED | OUTPATIENT
Start: 2017-11-27 | End: 2017-11-27

## 2017-11-27 RX ORDER — METHYLPREDNISOLONE ACETATE 80 MG/ML
80 INJECTION, SUSPENSION INTRA-ARTICULAR; INTRALESIONAL; INTRAMUSCULAR; SOFT TISSUE
Status: COMPLETED | OUTPATIENT
Start: 2017-11-27 | End: 2017-11-27

## 2017-11-27 RX ADMIN — METHYLPREDNISOLONE ACETATE 80 MG: 80 INJECTION, SUSPENSION INTRA-ARTICULAR; INTRALESIONAL; INTRAMUSCULAR; SOFT TISSUE at 16:05

## 2017-11-27 RX ADMIN — METHYLPREDNISOLONE ACETATE 80 MG: 80 INJECTION, SUSPENSION INTRA-ARTICULAR; INTRALESIONAL; INTRAMUSCULAR; SOFT TISSUE at 16:04

## 2017-11-27 RX ADMIN — BUPIVACAINE HYDROCHLORIDE 4 ML: 5 INJECTION, SOLUTION EPIDURAL; INTRACAUDAL at 16:04

## 2017-11-27 RX ADMIN — BUPIVACAINE HYDROCHLORIDE 4 ML: 5 INJECTION, SOLUTION EPIDURAL; INTRACAUDAL at 16:05

## 2017-12-11 RX ORDER — DIGOXIN 250 UG/1
TABLET ORAL
Qty: 90 TABLET | Refills: 0 | Status: SHIPPED | OUTPATIENT
Start: 2017-12-11 | End: 2018-04-06 | Stop reason: SDUPTHER

## 2017-12-15 ENCOUNTER — ANTICOAGULATION VISIT (OUTPATIENT)
Dept: INTERNAL MEDICINE | Facility: CLINIC | Age: 82
End: 2017-12-15

## 2017-12-15 DIAGNOSIS — I48.91 ATRIAL FIBRILLATION, UNSPECIFIED TYPE (HCC): ICD-10-CM

## 2017-12-15 LAB — INR PPP: 2.1 (ref 0.9–1.1)

## 2017-12-15 PROCEDURE — 36416 COLLJ CAPILLARY BLOOD SPEC: CPT | Performed by: INTERNAL MEDICINE

## 2017-12-15 PROCEDURE — 85610 PROTHROMBIN TIME: CPT | Performed by: INTERNAL MEDICINE

## 2018-01-02 RX ORDER — WARFARIN SODIUM 5 MG/1
TABLET ORAL
Qty: 90 TABLET | Refills: 0 | Status: SHIPPED | OUTPATIENT
Start: 2018-01-02 | End: 2018-07-26 | Stop reason: SDUPTHER

## 2018-02-09 ENCOUNTER — ANTICOAGULATION VISIT (OUTPATIENT)
Dept: INTERNAL MEDICINE | Facility: CLINIC | Age: 83
End: 2018-02-09

## 2018-02-09 DIAGNOSIS — I48.91 ATRIAL FIBRILLATION, UNSPECIFIED TYPE (HCC): ICD-10-CM

## 2018-02-09 LAB — INR PPP: 1.6 (ref 0.9–1.1)

## 2018-02-09 PROCEDURE — 85610 PROTHROMBIN TIME: CPT | Performed by: INTERNAL MEDICINE

## 2018-02-09 PROCEDURE — 36416 COLLJ CAPILLARY BLOOD SPEC: CPT | Performed by: INTERNAL MEDICINE

## 2018-02-12 RX ORDER — ATORVASTATIN CALCIUM 10 MG/1
TABLET, FILM COATED ORAL
Qty: 90 TABLET | Refills: 0 | Status: SHIPPED | OUTPATIENT
Start: 2018-02-12 | End: 2018-04-17 | Stop reason: SDUPTHER

## 2018-03-02 ENCOUNTER — TELEPHONE (OUTPATIENT)
Dept: INTERNAL MEDICINE | Facility: CLINIC | Age: 83
End: 2018-03-02

## 2018-03-02 ENCOUNTER — ANTICOAGULATION VISIT (OUTPATIENT)
Dept: INTERNAL MEDICINE | Facility: CLINIC | Age: 83
End: 2018-03-02

## 2018-03-02 DIAGNOSIS — I48.91 ATRIAL FIBRILLATION, UNSPECIFIED TYPE (HCC): ICD-10-CM

## 2018-03-02 LAB — INR PPP: 4.7 (ref 0.9–1.1)

## 2018-03-02 PROCEDURE — 85610 PROTHROMBIN TIME: CPT | Performed by: INTERNAL MEDICINE

## 2018-03-02 PROCEDURE — 36416 COLLJ CAPILLARY BLOOD SPEC: CPT | Performed by: INTERNAL MEDICINE

## 2018-03-05 ENCOUNTER — ANTICOAGULATION VISIT (OUTPATIENT)
Dept: INTERNAL MEDICINE | Facility: CLINIC | Age: 83
End: 2018-03-05

## 2018-03-05 DIAGNOSIS — I48.0 PAROXYSMAL ATRIAL FIBRILLATION (HCC): ICD-10-CM

## 2018-03-05 LAB — INR PPP: 2.7 (ref 0.9–1.1)

## 2018-03-05 PROCEDURE — 36416 COLLJ CAPILLARY BLOOD SPEC: CPT | Performed by: INTERNAL MEDICINE

## 2018-03-05 PROCEDURE — 85610 PROTHROMBIN TIME: CPT | Performed by: INTERNAL MEDICINE

## 2018-03-12 ENCOUNTER — CLINICAL SUPPORT (OUTPATIENT)
Dept: ORTHOPEDIC SURGERY | Facility: CLINIC | Age: 83
End: 2018-03-12

## 2018-03-12 VITALS — BODY MASS INDEX: 31.57 KG/M2 | TEMPERATURE: 97.5 F | HEIGHT: 66 IN | WEIGHT: 196.4 LBS

## 2018-03-12 DIAGNOSIS — M19.90 ARTHRITIS: Primary | ICD-10-CM

## 2018-03-12 PROCEDURE — 20610 DRAIN/INJ JOINT/BURSA W/O US: CPT | Performed by: ORTHOPAEDIC SURGERY

## 2018-03-12 RX ORDER — LIDOCAINE HYDROCHLORIDE 10 MG/ML
4 INJECTION, SOLUTION EPIDURAL; INFILTRATION; INTRACAUDAL; PERINEURAL
Status: COMPLETED | OUTPATIENT
Start: 2018-03-12 | End: 2018-03-12

## 2018-03-12 RX ORDER — METHYLPREDNISOLONE ACETATE 80 MG/ML
80 INJECTION, SUSPENSION INTRA-ARTICULAR; INTRALESIONAL; INTRAMUSCULAR; SOFT TISSUE
Status: COMPLETED | OUTPATIENT
Start: 2018-03-12 | End: 2018-03-12

## 2018-03-12 RX ADMIN — METHYLPREDNISOLONE ACETATE 80 MG: 80 INJECTION, SUSPENSION INTRA-ARTICULAR; INTRALESIONAL; INTRAMUSCULAR; SOFT TISSUE at 12:43

## 2018-03-12 RX ADMIN — METHYLPREDNISOLONE ACETATE 80 MG: 80 INJECTION, SUSPENSION INTRA-ARTICULAR; INTRALESIONAL; INTRAMUSCULAR; SOFT TISSUE at 12:42

## 2018-03-12 RX ADMIN — LIDOCAINE HYDROCHLORIDE 4 ML: 10 INJECTION, SOLUTION EPIDURAL; INFILTRATION; INTRACAUDAL; PERINEURAL at 12:42

## 2018-03-12 RX ADMIN — LIDOCAINE HYDROCHLORIDE 4 ML: 10 INJECTION, SOLUTION EPIDURAL; INFILTRATION; INTRACAUDAL; PERINEURAL at 12:43

## 2018-03-12 NOTE — PROGRESS NOTES
"Knee Joint Injection      Patient: Brandy Han        YOB: 1930            Chief Complaints: Knee pain      History of Present Illness: Pt gets intermittent  injections with good relief. Is here for repeat injection. Understands options.      Physical Exam: 87 y.o. female  General Appearance:    Alert, cooperative, in no acute distress                   Vitals:    03/12/18 1239   Temp: 97.5 °F (36.4 °C)   TempSrc: Temporal Artery    Weight: 89.1 kg (196 lb 6.4 oz)   Height: 167.6 cm (66\")      Patient is alert and read ×3 no acute distress appears her above-listed at height weight and age.  Affect is normal respiratory rate is normal unlabored. Heart rate regular rate rhythm, sclera, dentition and hearing are normal for the purpose of this exam.  Exam and complaints are unchanged.      Procedure:  Large Joint Arthrocentesis  Date/Time: 3/12/2018 12:42 PM  Consent given by: patient  Site marked: site marked  Timeout: Immediately prior to procedure a time out was called to verify the correct patient, procedure, equipment, support staff and site/side marked as required   Supporting Documentation  Indications: pain   Procedure Details  Location: knee - R knee  Preparation: Patient was prepped and draped in the usual sterile fashion  Needle size: 22 G  Approach: anteromedial  Medications administered: 80 mg methylPREDNISolone acetate 80 MG/ML; 4 mL lidocaine PF 1% 1 %  Patient tolerance: patient tolerated the procedure well with no immediate complications    Large Joint Arthrocentesis  Date/Time: 3/12/2018 12:43 PM  Consent given by: patient  Site marked: site marked  Timeout: Immediately prior to procedure a time out was called to verify the correct patient, procedure, equipment, support staff and site/side marked as required   Supporting Documentation  Indications: pain   Procedure Details  Location: knee - L knee  Preparation: Patient was prepped and draped in the usual sterile fashion  Needle size: 22 " G  Approach: anteromedial  Medications administered: 4 mL lidocaine PF 1% 1 %; 80 mg methylPREDNISolone acetate 80 MG/ML                  Assessment. Persistent knee pain      Plan: Is to proceed with injection    The knee joint was injected under strict sterile technique with Marcaine and Depo-Medrol this was done sterilely and tolerated tolerated well.

## 2018-03-21 ENCOUNTER — TELEPHONE (OUTPATIENT)
Dept: INTERNAL MEDICINE | Facility: CLINIC | Age: 83
End: 2018-03-21

## 2018-03-21 NOTE — TELEPHONE ENCOUNTER
Patient called and cancelled her appointment today due to the weather. She also stated that she is having serve pain in her legs where she can barley walk. She said she will call back and reschedule

## 2018-03-29 ENCOUNTER — ANTICOAGULATION VISIT (OUTPATIENT)
Dept: INTERNAL MEDICINE | Facility: CLINIC | Age: 83
End: 2018-03-29

## 2018-03-29 DIAGNOSIS — E78.5 HYPERLIPIDEMIA, UNSPECIFIED HYPERLIPIDEMIA TYPE: ICD-10-CM

## 2018-03-29 DIAGNOSIS — Z00.00 ANNUAL PHYSICAL EXAM: Primary | ICD-10-CM

## 2018-03-29 DIAGNOSIS — E53.8 B12 DEFICIENCY: ICD-10-CM

## 2018-03-29 DIAGNOSIS — I48.0 PAROXYSMAL ATRIAL FIBRILLATION (HCC): ICD-10-CM

## 2018-03-29 DIAGNOSIS — M81.0 OSTEOPOROSIS, UNSPECIFIED OSTEOPOROSIS TYPE, UNSPECIFIED PATHOLOGICAL FRACTURE PRESENCE: ICD-10-CM

## 2018-03-29 LAB — INR PPP: 1.2 (ref 0.9–1.1)

## 2018-03-29 PROCEDURE — 36416 COLLJ CAPILLARY BLOOD SPEC: CPT | Performed by: INTERNAL MEDICINE

## 2018-03-29 PROCEDURE — 85610 PROTHROMBIN TIME: CPT | Performed by: INTERNAL MEDICINE

## 2018-03-30 LAB
25(OH)D3+25(OH)D2 SERPL-MCNC: 13.6 NG/ML (ref 30–100)
ALBUMIN SERPL-MCNC: 4.1 G/DL (ref 3.5–5.2)
ALBUMIN/GLOB SERPL: 1.4 G/DL
ALP SERPL-CCNC: 86 U/L (ref 39–117)
ALT SERPL-CCNC: 16 U/L (ref 1–33)
APPEARANCE UR: CLEAR
AST SERPL-CCNC: 17 U/L (ref 1–32)
BACTERIA #/AREA URNS HPF: NORMAL /HPF
BASOPHILS # BLD AUTO: 0.02 10*3/MM3 (ref 0–0.2)
BASOPHILS NFR BLD AUTO: 0.3 % (ref 0–1.5)
BILIRUB SERPL-MCNC: 1.3 MG/DL (ref 0.1–1.2)
BILIRUB UR QL STRIP: NEGATIVE
BUN SERPL-MCNC: 25 MG/DL (ref 8–23)
BUN/CREAT SERPL: 24.5 (ref 7–25)
CALCIUM SERPL-MCNC: 9.2 MG/DL (ref 8.6–10.5)
CHLORIDE SERPL-SCNC: 102 MMOL/L (ref 98–107)
CHOLEST SERPL-MCNC: 178 MG/DL (ref 0–200)
CO2 SERPL-SCNC: 27.3 MMOL/L (ref 22–29)
COLOR UR: YELLOW
CREAT SERPL-MCNC: 1.02 MG/DL (ref 0.57–1)
EOSINOPHIL # BLD AUTO: 0.07 10*3/MM3 (ref 0–0.7)
EOSINOPHIL NFR BLD AUTO: 0.9 % (ref 0.3–6.2)
EPI CELLS #/AREA URNS HPF: NORMAL /HPF
ERYTHROCYTE [DISTWIDTH] IN BLOOD BY AUTOMATED COUNT: 12.8 % (ref 11.7–13)
GFR SERPLBLD CREATININE-BSD FMLA CKD-EPI: 51 ML/MIN/1.73
GFR SERPLBLD CREATININE-BSD FMLA CKD-EPI: 62 ML/MIN/1.73
GLOBULIN SER CALC-MCNC: 2.9 GM/DL
GLUCOSE SERPL-MCNC: 93 MG/DL (ref 65–99)
GLUCOSE UR QL: NEGATIVE
HCT VFR BLD AUTO: 45.7 % (ref 35.6–45.5)
HDLC SERPL-MCNC: 83 MG/DL (ref 40–60)
HGB BLD-MCNC: 14.3 G/DL (ref 11.9–15.5)
HGB UR QL STRIP: NEGATIVE
IMM GRANULOCYTES # BLD: 0.03 10*3/MM3 (ref 0–0.03)
IMM GRANULOCYTES NFR BLD: 0.4 % (ref 0–0.5)
KETONES UR QL STRIP: NEGATIVE
LDLC SERPL CALC-MCNC: 81 MG/DL (ref 0–100)
LEUKOCYTE ESTERASE UR QL STRIP: NEGATIVE
LYMPHOCYTES # BLD AUTO: 2.73 10*3/MM3 (ref 0.9–4.8)
LYMPHOCYTES NFR BLD AUTO: 35.5 % (ref 19.6–45.3)
MCH RBC QN AUTO: 32.2 PG (ref 26.9–32)
MCHC RBC AUTO-ENTMCNC: 31.3 G/DL (ref 32.4–36.3)
MCV RBC AUTO: 102.9 FL (ref 80.5–98.2)
MICRO URNS: NORMAL
MICRO URNS: NORMAL
MONOCYTES # BLD AUTO: 0.73 10*3/MM3 (ref 0.2–1.2)
MONOCYTES NFR BLD AUTO: 9.5 % (ref 5–12)
MUCOUS THREADS URNS QL MICRO: PRESENT /HPF
NEUTROPHILS # BLD AUTO: 4.1 10*3/MM3 (ref 1.9–8.1)
NEUTROPHILS NFR BLD AUTO: 53.4 % (ref 42.7–76)
NITRITE UR QL STRIP: NEGATIVE
PH UR STRIP: 7 [PH] (ref 5–7.5)
PLATELET # BLD AUTO: 175 10*3/MM3 (ref 140–500)
POTASSIUM SERPL-SCNC: 4.5 MMOL/L (ref 3.5–5.2)
PROT SERPL-MCNC: 7 G/DL (ref 6–8.5)
PROT UR QL STRIP: NEGATIVE
RBC # BLD AUTO: 4.44 10*6/MM3 (ref 3.9–5.2)
RBC #/AREA URNS HPF: NORMAL /HPF
SODIUM SERPL-SCNC: 142 MMOL/L (ref 136–145)
SP GR UR: 1.02 (ref 1–1.03)
TRIGL SERPL-MCNC: 70 MG/DL (ref 0–150)
TSH SERPL DL<=0.005 MIU/L-ACNC: 2.6 MIU/ML (ref 0.27–4.2)
URINALYSIS REFLEX: NORMAL
UROBILINOGEN UR STRIP-MCNC: 0.2 MG/DL (ref 0.2–1)
VLDLC SERPL CALC-MCNC: 14 MG/DL (ref 5–40)
WBC # BLD AUTO: 7.68 10*3/MM3 (ref 4.5–10.7)
WBC #/AREA URNS HPF: NORMAL /HPF

## 2018-04-01 ENCOUNTER — APPOINTMENT (OUTPATIENT)
Dept: CT IMAGING | Facility: HOSPITAL | Age: 83
End: 2018-04-01

## 2018-04-01 ENCOUNTER — HOSPITAL ENCOUNTER (EMERGENCY)
Facility: HOSPITAL | Age: 83
Discharge: HOME | End: 2018-04-02
Attending: EMERGENCY MEDICINE | Admitting: EMERGENCY MEDICINE

## 2018-04-01 ENCOUNTER — APPOINTMENT (OUTPATIENT)
Dept: GENERAL RADIOLOGY | Facility: HOSPITAL | Age: 83
End: 2018-04-01
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
## 2018-04-01 DIAGNOSIS — R79.1 SUBTHERAPEUTIC INTERNATIONAL NORMALIZED RATIO (INR): ICD-10-CM

## 2018-04-01 DIAGNOSIS — S39.012A BACK STRAIN, INITIAL ENCOUNTER: ICD-10-CM

## 2018-04-01 DIAGNOSIS — S30.1XXA CONTUSION OF ABDOMINAL WALL, INITIAL ENCOUNTER: Primary | ICD-10-CM

## 2018-04-01 DIAGNOSIS — R74.8 ELEVATED LIPASE: ICD-10-CM

## 2018-04-01 DIAGNOSIS — S20.219A CONTUSION OF CHEST WALL, UNSPECIFIED LATERALITY, INITIAL ENCOUNTER: ICD-10-CM

## 2018-04-01 LAB
ABO GROUP BLD: NORMAL
ALBUMIN SERPL-MCNC: 4 G/DL (ref 3.5–5.2)
ALBUMIN/GLOB SERPL: 1.2 G/DL
ALP SERPL-CCNC: 88 U/L (ref 39–117)
ALT SERPL W P-5'-P-CCNC: 18 U/L (ref 1–33)
ANION GAP SERPL CALCULATED.3IONS-SCNC: 16.1 MMOL/L
APTT PPP: 27.8 SECONDS (ref 22.7–35.4)
AST SERPL-CCNC: 25 U/L (ref 1–32)
BACTERIA UR QL AUTO: NORMAL /HPF
BASOPHILS # BLD AUTO: 0.01 10*3/MM3 (ref 0–0.2)
BASOPHILS NFR BLD AUTO: 0.1 % (ref 0–1.5)
BILIRUB SERPL-MCNC: 0.7 MG/DL (ref 0.1–1.2)
BILIRUB UR QL STRIP: NEGATIVE
BLD GP AB SCN SERPL QL: NEGATIVE
BUN BLD-MCNC: 25 MG/DL (ref 8–23)
BUN/CREAT SERPL: 27.2 (ref 7–25)
CALCIUM SPEC-SCNC: 9 MG/DL (ref 8.6–10.5)
CHLORIDE SERPL-SCNC: 102 MMOL/L (ref 98–107)
CLARITY UR: CLEAR
CO2 SERPL-SCNC: 23.9 MMOL/L (ref 22–29)
COLOR UR: YELLOW
CREAT BLD-MCNC: 0.92 MG/DL (ref 0.57–1)
DEPRECATED RDW RBC AUTO: 47.9 FL (ref 37–54)
EOSINOPHIL # BLD AUTO: 0.07 10*3/MM3 (ref 0–0.7)
EOSINOPHIL NFR BLD AUTO: 0.6 % (ref 0.3–6.2)
ERYTHROCYTE [DISTWIDTH] IN BLOOD BY AUTOMATED COUNT: 12.4 % (ref 11.7–13)
GFR SERPL CREATININE-BSD FRML MDRD: 58 ML/MIN/1.73
GLOBULIN UR ELPH-MCNC: 3.3 GM/DL
GLUCOSE BLD-MCNC: 102 MG/DL (ref 65–99)
GLUCOSE UR STRIP-MCNC: NEGATIVE MG/DL
HCT VFR BLD AUTO: 45.4 % (ref 35.6–45.5)
HGB BLD-MCNC: 14.1 G/DL (ref 11.9–15.5)
HGB UR QL STRIP.AUTO: ABNORMAL
HYALINE CASTS UR QL AUTO: NORMAL /LPF
IMM GRANULOCYTES # BLD: 0.05 10*3/MM3 (ref 0–0.03)
IMM GRANULOCYTES NFR BLD: 0.5 % (ref 0–0.5)
INR PPP: 1.21 (ref 0.9–1.1)
KETONES UR QL STRIP: NEGATIVE
LEUKOCYTE ESTERASE UR QL STRIP.AUTO: NEGATIVE
LIPASE SERPL-CCNC: 87 U/L (ref 13–60)
LYMPHOCYTES # BLD AUTO: 1.98 10*3/MM3 (ref 0.9–4.8)
LYMPHOCYTES NFR BLD AUTO: 17.8 % (ref 19.6–45.3)
MCH RBC QN AUTO: 32.3 PG (ref 26.9–32)
MCHC RBC AUTO-ENTMCNC: 31.1 G/DL (ref 32.4–36.3)
MCV RBC AUTO: 104.1 FL (ref 80.5–98.2)
MONOCYTES # BLD AUTO: 0.97 10*3/MM3 (ref 0.2–1.2)
MONOCYTES NFR BLD AUTO: 8.7 % (ref 5–12)
NEUTROPHILS # BLD AUTO: 8.03 10*3/MM3 (ref 1.9–8.1)
NEUTROPHILS NFR BLD AUTO: 72.3 % (ref 42.7–76)
NITRITE UR QL STRIP: NEGATIVE
PH UR STRIP.AUTO: 5.5 [PH] (ref 5–8)
PLAT MORPH BLD: NORMAL
PLATELET # BLD AUTO: 154 10*3/MM3 (ref 140–500)
PMV BLD AUTO: 11.1 FL (ref 6–12)
POTASSIUM BLD-SCNC: 4.3 MMOL/L (ref 3.5–5.2)
PROT SERPL-MCNC: 7.3 G/DL (ref 6–8.5)
PROT UR QL STRIP: NEGATIVE
PROTHROMBIN TIME: 15.1 SECONDS (ref 11.7–14.2)
RBC # BLD AUTO: 4.36 10*6/MM3 (ref 3.9–5.2)
RBC # UR: NORMAL /HPF
RBC MORPH BLD: NORMAL
REF LAB TEST METHOD: NORMAL
RH BLD: POSITIVE
SODIUM BLD-SCNC: 142 MMOL/L (ref 136–145)
SP GR UR STRIP: >=1.03 (ref 1–1.03)
SQUAMOUS #/AREA URNS HPF: NORMAL /HPF
T&S EXPIRATION DATE: NORMAL
UROBILINOGEN UR QL STRIP: ABNORMAL
WBC MORPH BLD: NORMAL
WBC NRBC COR # BLD: 11.11 10*3/MM3 (ref 4.5–10.7)
WBC UR QL AUTO: NORMAL /HPF

## 2018-04-01 PROCEDURE — 96361 HYDRATE IV INFUSION ADD-ON: CPT

## 2018-04-01 PROCEDURE — 83690 ASSAY OF LIPASE: CPT | Performed by: EMERGENCY MEDICINE

## 2018-04-01 PROCEDURE — 96360 HYDRATION IV INFUSION INIT: CPT

## 2018-04-01 PROCEDURE — 81001 URINALYSIS AUTO W/SCOPE: CPT | Performed by: EMERGENCY MEDICINE

## 2018-04-01 PROCEDURE — 86901 BLOOD TYPING SEROLOGIC RH(D): CPT | Performed by: EMERGENCY MEDICINE

## 2018-04-01 PROCEDURE — 36415 COLL VENOUS BLD VENIPUNCTURE: CPT

## 2018-04-01 PROCEDURE — 72131 CT LUMBAR SPINE W/O DYE: CPT

## 2018-04-01 PROCEDURE — 80053 COMPREHEN METABOLIC PANEL: CPT | Performed by: EMERGENCY MEDICINE

## 2018-04-01 PROCEDURE — 99285 EMERGENCY DEPT VISIT HI MDM: CPT

## 2018-04-01 PROCEDURE — 85730 THROMBOPLASTIN TIME PARTIAL: CPT | Performed by: EMERGENCY MEDICINE

## 2018-04-01 PROCEDURE — 85610 PROTHROMBIN TIME: CPT | Performed by: EMERGENCY MEDICINE

## 2018-04-01 PROCEDURE — 86850 RBC ANTIBODY SCREEN: CPT | Performed by: EMERGENCY MEDICINE

## 2018-04-01 PROCEDURE — 25010000002 IOPAMIDOL 61 % SOLUTION: Performed by: EMERGENCY MEDICINE

## 2018-04-01 PROCEDURE — 71260 CT THORAX DX C+: CPT

## 2018-04-01 PROCEDURE — 85025 COMPLETE CBC W/AUTO DIFF WBC: CPT | Performed by: EMERGENCY MEDICINE

## 2018-04-01 PROCEDURE — 70450 CT HEAD/BRAIN W/O DYE: CPT

## 2018-04-01 PROCEDURE — 86900 BLOOD TYPING SEROLOGIC ABO: CPT | Performed by: EMERGENCY MEDICINE

## 2018-04-01 PROCEDURE — 74177 CT ABD & PELVIS W/CONTRAST: CPT

## 2018-04-01 PROCEDURE — 72128 CT CHEST SPINE W/O DYE: CPT

## 2018-04-01 PROCEDURE — 85007 BL SMEAR W/DIFF WBC COUNT: CPT | Performed by: EMERGENCY MEDICINE

## 2018-04-01 PROCEDURE — 71045 X-RAY EXAM CHEST 1 VIEW: CPT

## 2018-04-01 RX ORDER — SODIUM CHLORIDE 9 MG/ML
125 INJECTION, SOLUTION INTRAVENOUS CONTINUOUS
Status: DISCONTINUED | OUTPATIENT
Start: 2018-04-01 | End: 2018-04-02 | Stop reason: HOSPADM

## 2018-04-01 RX ORDER — HYDROCODONE BITARTRATE AND ACETAMINOPHEN 5; 325 MG/1; MG/1
1 TABLET ORAL EVERY 8 HOURS PRN
Qty: 10 TABLET | Refills: 0 | Status: SHIPPED | OUTPATIENT
Start: 2018-04-01 | End: 2019-09-27

## 2018-04-01 RX ORDER — HYDROCODONE BITARTRATE AND ACETAMINOPHEN 7.5; 325 MG/1; MG/1
1 TABLET ORAL ONCE
Status: COMPLETED | OUTPATIENT
Start: 2018-04-01 | End: 2018-04-02

## 2018-04-01 RX ORDER — SODIUM CHLORIDE 0.9 % (FLUSH) 0.9 %
10 SYRINGE (ML) INJECTION AS NEEDED
Status: DISCONTINUED | OUTPATIENT
Start: 2018-04-01 | End: 2018-04-02 | Stop reason: HOSPADM

## 2018-04-01 RX ADMIN — IOPAMIDOL 85 ML: 612 INJECTION, SOLUTION INTRAVENOUS at 21:54

## 2018-04-01 RX ADMIN — SODIUM CHLORIDE 125 ML/HR: 9 INJECTION, SOLUTION INTRAVENOUS at 21:08

## 2018-04-02 VITALS
HEART RATE: 107 BPM | WEIGHT: 185 LBS | SYSTOLIC BLOOD PRESSURE: 117 MMHG | OXYGEN SATURATION: 98 % | DIASTOLIC BLOOD PRESSURE: 74 MMHG | TEMPERATURE: 98.1 F | BODY MASS INDEX: 27.4 KG/M2 | HEIGHT: 69 IN | RESPIRATION RATE: 16 BRPM

## 2018-04-02 RX ADMIN — HYDROCODONE BITARTRATE AND ACETAMINOPHEN 1 TABLET: 7.5; 325 TABLET ORAL at 00:18

## 2018-04-02 NOTE — ED NOTES
"Pt. Reporting pain with deep breathing in abdomen. Generalized abd pain \"under my breasts.\" Pt. Has obvious bruising to left upper arm and bilateral breasts. Pt. Reporting pain to left side lower lumbar. Pt. Reporting mild pain upon abd palpation in all quadrants.      Red Jerome RN  04/01/18 2056    "
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
## 2018-04-02 NOTE — ED NOTES
Dr Ge requests pt to lay as flat as possible. HOB up at this time @ 10 degrees. Pt is comfortable if not moving.     Shira Gilmore RN  04/01/18 8403

## 2018-04-02 NOTE — DISCHARGE INSTRUCTIONS
You are advised to follow closely with Dr Sutton in 2-3 days for recheck, further regulation of your blood thinning levels, final results of lab work and imaging testing, and further testing/treatment as needed.    Heat and gentle stretching of your back  Ice all contusions 3 times daily as needed    Please return to the emergency department immediately with persistent or worsening chest pain different than usual for you, shortness of air, persistent or worsening abdominal pain, persistent vomiting/fever, blood in emesis or stool, lightheadedness/fainting, problems with speech, one sided weakness/numbness, new incontinence, problems with vision, altered mental status,  or for worsening of symptoms or other concerns.

## 2018-04-03 ENCOUNTER — EPISODE CHANGES (OUTPATIENT)
Dept: CASE MANAGEMENT | Facility: OTHER | Age: 83
End: 2018-04-03

## 2018-04-03 ENCOUNTER — TELEPHONE (OUTPATIENT)
Dept: SOCIAL WORK | Facility: HOSPITAL | Age: 83
End: 2018-04-03

## 2018-04-03 NOTE — TELEPHONE ENCOUNTER
ED f/u phone call: states she still has soreness, currently taking Tylenol w/ some relief (getting script for pain med filled today) and has upcoming f/u bessie't w/ PCP. No questions/concerns

## 2018-04-04 ENCOUNTER — TELEPHONE (OUTPATIENT)
Dept: INTERNAL MEDICINE | Facility: CLINIC | Age: 83
End: 2018-04-04
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
## 2018-04-05 ENCOUNTER — OFFICE VISIT (OUTPATIENT)
Dept: INTERNAL MEDICINE | Facility: CLINIC | Age: 83
End: 2018-04-05

## 2018-04-05 ENCOUNTER — ANTICOAGULATION VISIT (OUTPATIENT)
Dept: INTERNAL MEDICINE | Facility: CLINIC | Age: 83
End: 2018-04-05

## 2018-04-05 VITALS
DIASTOLIC BLOOD PRESSURE: 70 MMHG | BODY MASS INDEX: 27.11 KG/M2 | WEIGHT: 183 LBS | SYSTOLIC BLOOD PRESSURE: 118 MMHG | HEART RATE: 89 BPM | OXYGEN SATURATION: 95 % | HEIGHT: 69 IN

## 2018-04-05 DIAGNOSIS — Z79.01 ANTICOAGULATED: ICD-10-CM

## 2018-04-05 DIAGNOSIS — I48.0 PAROXYSMAL ATRIAL FIBRILLATION (HCC): ICD-10-CM

## 2018-04-05 DIAGNOSIS — T14.8XXA BRUISING: ICD-10-CM
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
## 2018-04-05 LAB — INR PPP: 1.4 (ref 0.9–1.1)

## 2018-04-05 PROCEDURE — 85610 PROTHROMBIN TIME: CPT | Performed by: INTERNAL MEDICINE

## 2018-04-05 PROCEDURE — 36416 COLLJ CAPILLARY BLOOD SPEC: CPT | Performed by: INTERNAL MEDICINE

## 2018-04-05 PROCEDURE — 99213 OFFICE O/P EST LOW 20 MIN: CPT | Performed by: INTERNAL MEDICINE
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
## 2018-04-06 ENCOUNTER — PATIENT OUTREACH (OUTPATIENT)
Dept: CASE MANAGEMENT | Facility: OTHER | Age: 83
End: 2018-04-06

## 2018-04-06 RX ORDER — DIGOXIN 250 UG/1
TABLET ORAL
Qty: 90 TABLET | Refills: 0 | Status: SHIPPED | OUTPATIENT
Start: 2018-04-06 | End: 2018-07-26 | Stop reason: SDUPTHER

## 2018-04-06 RX ORDER — METOPROLOL SUCCINATE 50 MG/1
TABLET, EXTENDED RELEASE ORAL
Qty: 90 TABLET | Refills: 0 | Status: SHIPPED | OUTPATIENT
Start: 2018-04-06 | End: 2018-07-26 | Stop reason: SDUPTHER
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
## 2018-04-17 ENCOUNTER — OFFICE VISIT (OUTPATIENT)
Dept: INTERNAL MEDICINE | Facility: CLINIC | Age: 83
End: 2018-04-17

## 2018-04-17 VITALS
OXYGEN SATURATION: 97 % | HEART RATE: 75 BPM | BODY MASS INDEX: 27.55 KG/M2 | HEIGHT: 69 IN | WEIGHT: 186 LBS | SYSTOLIC BLOOD PRESSURE: 126 MMHG | DIASTOLIC BLOOD PRESSURE: 66 MMHG

## 2018-04-17 DIAGNOSIS — E53.8 B12 DEFICIENCY: ICD-10-CM

## 2018-04-17 DIAGNOSIS — Z00.00 MEDICARE ANNUAL WELLNESS VISIT, SUBSEQUENT: Primary | ICD-10-CM

## 2018-04-17 DIAGNOSIS — E78.5 HYPERLIPIDEMIA, UNSPECIFIED HYPERLIPIDEMIA TYPE: ICD-10-CM

## 2018-04-17 DIAGNOSIS — I73.9 PERIPHERAL VASCULAR DISEASE (HCC): ICD-10-CM

## 2018-04-17 DIAGNOSIS — I48.0 PAROXYSMAL ATRIAL FIBRILLATION (HCC): ICD-10-CM

## 2018-04-17 LAB — INR PPP: 1.9 (ref 0.9–1.1)

## 2018-04-17 PROCEDURE — 36416 COLLJ CAPILLARY BLOOD SPEC: CPT | Performed by: INTERNAL MEDICINE

## 2018-04-17 PROCEDURE — G0439 PPPS, SUBSEQ VISIT: HCPCS | Performed by: INTERNAL MEDICINE

## 2018-04-17 PROCEDURE — 85610 PROTHROMBIN TIME: CPT | Performed by: INTERNAL MEDICINE

## 2018-04-17 PROCEDURE — 99214 OFFICE O/P EST MOD 30 MIN: CPT | Performed by: INTERNAL MEDICINE

## 2018-04-17 RX ORDER — ATORVASTATIN CALCIUM 20 MG/1
20 TABLET, FILM COATED ORAL DAILY
Qty: 90 TABLET | Refills: 3 | Status: SHIPPED | OUTPATIENT
Start: 2018-04-17 | End: 2019-06-10 | Stop reason: SDUPTHER

## 2018-04-17 NOTE — PROGRESS NOTES
Subjective     Brandy Han is a 87 y.o. female who presents for an annual wellness visit as well as check up of hld, afib, b12 def, pvd.      History of Present Illness     HLD.  Not optimal.  Discussed increase of atorvastatin to improve lipids.   Anticoagulation for atrial fib.  Due for protime check today. See flowsheet.  PVD. She is followed by Dr. Mcarthur and on med management.  She is not a surgical candidate.    B12 def.  She is due for her monthly shot.     Review of Systems   Respiratory: Negative.    Cardiovascular: Negative.        The following portions of the patient's history were reviewed and updated as appropriate: allergies, current medications, past family history, past medical history, past social history, past surgical history and problem list.  Health maintenance tab was reviewed and updated with the patient.       Patient Active Problem List    Diagnosis Date Noted   • Primary localized osteoarthrosis of lower leg 11/27/2017   • Chronic pain of both knees 04/25/2017   • B12 deficiency 11/04/2016   • Sinus arrest 10/04/2016     Note Last Updated: 10/4/2016     Overview:   per Cardionet Monitor July 2013; s/p pacemaker     • Presence of cardiac pacemaker 10/04/2016   • Right shoulder pain 07/15/2016   • Lumbar spine pain 07/15/2016   • Right-sided low back pain with right-sided sciatica 07/15/2016   • Venous thrombosis 06/14/2016   • Peripheral vascular disease 06/14/2016     Note Last Updated: 6/14/2016     Description: total occlusion of the SOLO and stenosis of the LICA.     • Osteoporosis 06/14/2016     Note Last Updated: 6/14/2016     Description: s/p 10 years of Fosamax.     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       Past Medical History:   Diagnosis Date   • Anxiety    • Arterial thrombosis    • Arthritis    • Headache    • Heart palpitations    • Syncope        Past  Surgical History:   Procedure Laterality Date   • ANKLE SURGERY     • CARDIAC PACEMAKER PLACEMENT     • OTHER SURGICAL HISTORY Left     elbow surgery       Family History   Problem Relation Age of Onset   • Heart disease Mother    • Lung cancer Father    • Stroke Daughter      cerebral accident       Social History     Social History   • Marital status:      Spouse name: N/A   • Number of children: N/A   • Years of education: N/A     Occupational History   • Not on file.     Social History Main Topics   • Smoking status: Never Smoker   • Smokeless tobacco: Never Used   • Alcohol use Yes      Comment: social   • Drug use: No   • Sexual activity: Defer     Other Topics Concern   • Not on file     Social History Narrative   • No narrative on file       Current Outpatient Prescriptions on File Prior to Visit   Medication Sig Dispense Refill   • DIGOX 250 MCG tablet TAKE 1 TABLET BY MOUTH DAILY 90 tablet 0   • HYDROcodone-acetaminophen (NORCO) 5-325 MG per tablet Take 1 tablet by mouth Every 8 (Eight) Hours As Needed for Moderate Pain . 10 tablet 0   • metoprolol succinate XL (TOPROL-XL) 50 MG 24 hr tablet TAKE 1 TABLET BY MOUTH DAILY 90 tablet 0   • triamcinolone (KENALOG) 0.1 % cream APPLY TOPICALLY TO AFFECTED AREA BID  2   • warfarin (COUMADIN) 5 MG tablet TAKE 1 TABLET BY MOUTH DAILY 90 tablet 0   • [DISCONTINUED] atorvastatin (LIPITOR) 10 MG tablet TAKE 1 TABLET BY MOUTH DAILY 90 tablet 0   • [DISCONTINUED] metoprolol succinate XL (TOPROL-XL) 50 MG 24 hr tablet Take 1 tablet by mouth Daily. 90 tablet 3     Current Facility-Administered Medications on File Prior to Visit   Medication Dose Route Frequency Provider Last Rate Last Dose   • cyanocobalamin injection 1,000 mcg  1,000 mcg Intramuscular Q28 Days Rupal Sutton MD   1,000 mcg at 08/25/17 1432       No Known Allergies    Immunization History   Administered Date(s) Administered   • Flu Vaccine High Dose PF 65YR+ 10/03/2016   • Influenza, Quadrivalent  "09/14/2015   • Pneumococcal Conjugate 13-Valent (PCV13) 10/03/2016   • Pneumococcal Polysaccharide (PPSV23) 01/01/2010   • Tdap 01/15/2012       Objective     /66   Pulse 75   Ht 175.3 cm (69.02\")   Wt 84.4 kg (186 lb)   SpO2 97%   BMI 27.45 kg/m²     Physical Exam   Constitutional: She is oriented to person, place, and time. She appears well-developed and well-nourished.   HENT:   Head: Normocephalic and atraumatic.   Cardiovascular: Normal rate, regular rhythm and normal heart sounds.    Pulmonary/Chest: Effort normal and breath sounds normal.   Neurological: She is alert and oriented to person, place, and time.   Skin: Skin is warm and dry.   Psychiatric: She has a normal mood and affect. Her behavior is normal.       Assessment/Plan   Brandy was seen today for medicare wellness visit.    Diagnoses and all orders for this visit:    Medicare annual wellness visit, subsequent    Hyperlipidemia, unspecified hyperlipidemia type    Peripheral vascular disease    B12 deficiency    Paroxysmal atrial fibrillation  -     POC INR    Other orders  -     atorvastatin (LIPITOR) 20 MG tablet; Take 1 tablet by mouth Daily.        Discussion    AWV.  See scanned forms for davide history, PHQ-9, functional ability questionnaire, cognitive impairment screening.  Direct observation of cognitive abilities:  The patient does not exhibit  any impairment in cognitive abilities upon direct observation at today's visit.     These were all reviewed with the patient and the patient was provided with a personal prevention plan of service in patient instructions.  Patient was given advice or information on the following topics:  nutrition, exercise   HLD.  Increase atorvastatin to 20mg.   PVD.  I encouranged her to make f/u appointment with Dr. Mcarthur.   Afib.  Rate control with pacemaker.  INR done today.   B12 def.  I advise monthly shots.   Vitamin D def.  I recommend 1000 IUs daily OTC.     I have recommended patient get both " hep A and shingrix shots.     Health Maintenance   Topic Date Due   • MEDICARE ANNUAL WELLNESS  04/22/2016   • ZOSTER VACCINE  04/22/2016   • DXA SCAN  10/03/2016   • INFLUENZA VACCINE  08/01/2018   • LIPID PANEL  03/29/2019   • MAMMOGRAM  04/17/2020   • TDAP/TD VACCINES (2 - Td) 01/15/2022   • PNEUMOCOCCAL VACCINES (65+ LOW/MEDIUM RISK)  Completed            Future Appointments  Date Time Provider Department Center   6/18/2018 12:30 PM Jeanna Buckner MD [facility] None

## 2018-04-27 ENCOUNTER — ANTICOAGULATION VISIT (OUTPATIENT)
Dept: INTERNAL MEDICINE | Facility: CLINIC | Age: 83
End: 2018-04-27

## 2018-04-27 DIAGNOSIS — I48.0 PAROXYSMAL ATRIAL FIBRILLATION (HCC): ICD-10-CM

## 2018-04-27 LAB — INR PPP: 4.2 (ref 0.9–1.1)

## 2018-04-27 PROCEDURE — 36416 COLLJ CAPILLARY BLOOD SPEC: CPT | Performed by: INTERNAL MEDICINE

## 2018-04-27 PROCEDURE — 85610 PROTHROMBIN TIME: CPT | Performed by: INTERNAL MEDICINE

## 2018-05-04 ENCOUNTER — ANTICOAGULATION VISIT (OUTPATIENT)
Dept: INTERNAL MEDICINE | Facility: CLINIC | Age: 83
End: 2018-05-04

## 2018-05-04 DIAGNOSIS — I48.0 PAROXYSMAL ATRIAL FIBRILLATION (HCC): ICD-10-CM

## 2018-05-04 LAB — INR PPP: 2 (ref 0.9–1.1)

## 2018-05-04 PROCEDURE — 36416 COLLJ CAPILLARY BLOOD SPEC: CPT | Performed by: INTERNAL MEDICINE

## 2018-05-04 PROCEDURE — 85610 PROTHROMBIN TIME: CPT | Performed by: INTERNAL MEDICINE

## 2018-05-15 ENCOUNTER — EPISODE CHANGES (OUTPATIENT)
Dept: CASE MANAGEMENT | Facility: OTHER | Age: 83
End: 2018-05-15

## 2018-05-18 ENCOUNTER — ANTICOAGULATION VISIT (OUTPATIENT)
Dept: INTERNAL MEDICINE | Facility: CLINIC | Age: 83
End: 2018-05-18

## 2018-05-18 DIAGNOSIS — I48.0 PAROXYSMAL ATRIAL FIBRILLATION (HCC): ICD-10-CM

## 2018-05-18 LAB — INR PPP: 4.2 (ref 0.9–1.1)

## 2018-05-18 PROCEDURE — 85610 PROTHROMBIN TIME: CPT | Performed by: INTERNAL MEDICINE

## 2018-05-18 PROCEDURE — 96372 THER/PROPH/DIAG INJ SC/IM: CPT | Performed by: INTERNAL MEDICINE

## 2018-05-18 PROCEDURE — 36416 COLLJ CAPILLARY BLOOD SPEC: CPT | Performed by: INTERNAL MEDICINE

## 2018-05-18 RX ADMIN — CYANOCOBALAMIN 1000 MCG: 1000 INJECTION, SOLUTION INTRAMUSCULAR; SUBCUTANEOUS at 14:53

## 2018-06-08 ENCOUNTER — ANTICOAGULATION VISIT (OUTPATIENT)
Dept: INTERNAL MEDICINE | Facility: CLINIC | Age: 83
End: 2018-06-08

## 2018-06-08 DIAGNOSIS — I48.0 PAROXYSMAL ATRIAL FIBRILLATION (HCC): ICD-10-CM

## 2018-06-08 LAB — INR PPP: 1.2 (ref 0.9–1.1)

## 2018-06-08 PROCEDURE — 36416 COLLJ CAPILLARY BLOOD SPEC: CPT | Performed by: INTERNAL MEDICINE

## 2018-06-08 PROCEDURE — 85610 PROTHROMBIN TIME: CPT | Performed by: INTERNAL MEDICINE

## 2018-06-08 RX ORDER — WARFARIN SODIUM 3 MG/1
TABLET ORAL
Qty: 30 TABLET | Refills: 5 | Status: SHIPPED | OUTPATIENT
Start: 2018-06-08 | End: 2018-11-30

## 2018-06-18 ENCOUNTER — CLINICAL SUPPORT (OUTPATIENT)
Dept: ORTHOPEDIC SURGERY | Facility: CLINIC | Age: 83
End: 2018-06-18

## 2018-06-18 VITALS — WEIGHT: 175 LBS | HEIGHT: 69 IN | BODY MASS INDEX: 25.92 KG/M2 | TEMPERATURE: 97.5 F

## 2018-06-18 DIAGNOSIS — M25.562 BILATERAL CHRONIC KNEE PAIN: Primary | ICD-10-CM

## 2018-06-18 DIAGNOSIS — G89.29 BILATERAL CHRONIC KNEE PAIN: Primary | ICD-10-CM

## 2018-06-18 DIAGNOSIS — M25.561 BILATERAL CHRONIC KNEE PAIN: Primary | ICD-10-CM

## 2018-06-18 PROCEDURE — 73562 X-RAY EXAM OF KNEE 3: CPT | Performed by: ORTHOPAEDIC SURGERY

## 2018-06-18 RX ADMIN — METHYLPREDNISOLONE ACETATE 80 MG: 80 INJECTION, SUSPENSION INTRA-ARTICULAR; INTRALESIONAL; INTRAMUSCULAR; SOFT TISSUE at 11:39

## 2018-06-18 RX ADMIN — LIDOCAINE HYDROCHLORIDE 4 ML: 10 INJECTION, SOLUTION EPIDURAL; INFILTRATION; INTRACAUDAL; PERINEURAL at 11:39

## 2018-06-18 NOTE — PROGRESS NOTES
"Bilateral Knee Joint Injection      Patient: Brandy Han        YOB: 1930            Chief Complaints:bilateral Knee pain  Chief Complaint   Patient presents with   • Right Knee - Injections   • Left Knee - Injections         History of Present Illness: Pt gets intermittent  injections with good relief. Is here for repeat injection. Understands options.      Physical Exam: 88 y.o. female  General Appearance:    Alert, cooperative, in no acute distress                   Vitals:    06/18/18 1246   Temp: 97.5 °F (36.4 °C)   Weight: 79.4 kg (175 lb)   Height: 175.3 cm (69\")      Patient is alert and read ×3 no acute distress appears her above-listed at height weight and age.  Affect is normal respiratory rate is normal unlabored. Heart rate regular rate rhythm, sclera, dentition and hearing are normal for the purpose of this exam.  Exam and complaints are unchanged.      Procedure:  Large Joint Arthrocentesis  Date/Time: 6/21/2018 12:47 PM  Consent given by: patient  Site marked: site marked  Timeout: Immediately prior to procedure a time out was called to verify the correct patient, procedure, equipment, support staff and site/side marked as required   Supporting Documentation  Indications: pain   Procedure Details  Location: knee - R knee  Preparation: Patient was prepped and draped in the usual sterile fashion  Needle size: 22 G  Approach: anteromedial  Medications administered: 4 mL lidocaine PF 1% 1 %; 80 mg methylPREDNISolone acetate 80 MG/ML  Patient tolerance: patient tolerated the procedure well with no immediate complications    Large Joint Arthrocentesis  Date/Time: 6/18/2018 11:39 AM  Consent given by: patient  Site marked: site marked  Timeout: Immediately prior to procedure a time out was called to verify the correct patient, procedure, equipment, support staff and site/side marked as required   Supporting Documentation  Indications: pain   Procedure Details  Location: knee - L " knee  Preparation: Patient was prepped and draped in the usual sterile fashion  Needle size: 22 G  Approach: anteromedial  Medications administered: 4 mL lidocaine PF 1% 1 %; 80 mg methylPREDNISolone acetate 80 MG/ML  Patient tolerance: patient tolerated the procedure well with no immediate complications          X-rays AP lateral merchant view both knees were taken to evaluate her symptoms and compared to previous films she has mild lateral compartment OA on the right mild medial compartment OA on the left severe bilateral patellofemoral OA and osteopenia      Assessment. Persistent knee pain      Plan: Is to proceed with injections    The knee joint was injected under strict sterile technique with Marcaine and Depo-Medrol this was done sterilely and tolerated tolerated well.

## 2018-06-21 PROCEDURE — 20610 DRAIN/INJ JOINT/BURSA W/O US: CPT | Performed by: ORTHOPAEDIC SURGERY

## 2018-06-21 RX ORDER — METHYLPREDNISOLONE ACETATE 80 MG/ML
80 INJECTION, SUSPENSION INTRA-ARTICULAR; INTRALESIONAL; INTRAMUSCULAR; SOFT TISSUE
Status: COMPLETED | OUTPATIENT
Start: 2018-06-18 | End: 2018-06-18

## 2018-06-21 RX ORDER — LIDOCAINE HYDROCHLORIDE 10 MG/ML
4 INJECTION, SOLUTION EPIDURAL; INFILTRATION; INTRACAUDAL; PERINEURAL
Status: COMPLETED | OUTPATIENT
Start: 2018-06-21 | End: 2018-06-21

## 2018-06-21 RX ORDER — LIDOCAINE HYDROCHLORIDE 10 MG/ML
4 INJECTION, SOLUTION EPIDURAL; INFILTRATION; INTRACAUDAL; PERINEURAL
Status: COMPLETED | OUTPATIENT
Start: 2018-06-18 | End: 2018-06-18

## 2018-06-21 RX ORDER — METHYLPREDNISOLONE ACETATE 80 MG/ML
80 INJECTION, SUSPENSION INTRA-ARTICULAR; INTRALESIONAL; INTRAMUSCULAR; SOFT TISSUE
Status: COMPLETED | OUTPATIENT
Start: 2018-06-21 | End: 2018-06-21

## 2018-06-21 RX ADMIN — METHYLPREDNISOLONE ACETATE 80 MG: 80 INJECTION, SUSPENSION INTRA-ARTICULAR; INTRALESIONAL; INTRAMUSCULAR; SOFT TISSUE at 12:47

## 2018-06-21 RX ADMIN — LIDOCAINE HYDROCHLORIDE 4 ML: 10 INJECTION, SOLUTION EPIDURAL; INFILTRATION; INTRACAUDAL; PERINEURAL at 12:47

## 2018-06-29 ENCOUNTER — ANTICOAGULATION VISIT (OUTPATIENT)
Dept: INTERNAL MEDICINE | Facility: CLINIC | Age: 83
End: 2018-06-29

## 2018-06-29 DIAGNOSIS — I48.0 PAROXYSMAL ATRIAL FIBRILLATION (HCC): ICD-10-CM

## 2018-06-29 LAB — INR PPP: 1.4 (ref 0.9–1.1)

## 2018-06-29 PROCEDURE — 36416 COLLJ CAPILLARY BLOOD SPEC: CPT | Performed by: INTERNAL MEDICINE

## 2018-06-29 PROCEDURE — 85610 PROTHROMBIN TIME: CPT | Performed by: INTERNAL MEDICINE

## 2018-07-26 RX ORDER — WARFARIN SODIUM 5 MG/1
TABLET ORAL
Qty: 90 TABLET | Refills: 0 | Status: SHIPPED | OUTPATIENT
Start: 2018-07-26 | End: 2018-10-31 | Stop reason: SDUPTHER

## 2018-07-26 RX ORDER — METOPROLOL SUCCINATE 50 MG/1
TABLET, EXTENDED RELEASE ORAL
Qty: 90 TABLET | Refills: 0 | Status: SHIPPED | OUTPATIENT
Start: 2018-07-26 | End: 2018-10-31 | Stop reason: SDUPTHER

## 2018-07-26 RX ORDER — DIGOXIN 250 UG/1
TABLET ORAL
Qty: 90 TABLET | Refills: 0 | Status: SHIPPED | OUTPATIENT
Start: 2018-07-26 | End: 2018-11-12 | Stop reason: SDUPTHER

## 2018-07-27 ENCOUNTER — ANTICOAGULATION VISIT (OUTPATIENT)
Dept: INTERNAL MEDICINE | Facility: CLINIC | Age: 83
End: 2018-07-27

## 2018-07-27 DIAGNOSIS — I48.0 PAROXYSMAL ATRIAL FIBRILLATION (HCC): ICD-10-CM

## 2018-07-27 LAB — INR PPP: 1.3 (ref 0.9–1.1)

## 2018-07-27 PROCEDURE — 36416 COLLJ CAPILLARY BLOOD SPEC: CPT | Performed by: INTERNAL MEDICINE

## 2018-07-27 PROCEDURE — 85610 PROTHROMBIN TIME: CPT | Performed by: INTERNAL MEDICINE

## 2018-08-10 ENCOUNTER — ANTICOAGULATION VISIT (OUTPATIENT)
Dept: INTERNAL MEDICINE | Facility: CLINIC | Age: 83
End: 2018-08-10

## 2018-08-10 DIAGNOSIS — I48.0 PAROXYSMAL ATRIAL FIBRILLATION (HCC): ICD-10-CM

## 2018-08-10 LAB — INR PPP: 2.3 (ref 0.9–1.1)

## 2018-08-10 PROCEDURE — 36416 COLLJ CAPILLARY BLOOD SPEC: CPT | Performed by: INTERNAL MEDICINE

## 2018-08-10 PROCEDURE — 85610 PROTHROMBIN TIME: CPT | Performed by: INTERNAL MEDICINE

## 2018-09-14 ENCOUNTER — ANTICOAGULATION VISIT (OUTPATIENT)
Dept: INTERNAL MEDICINE | Facility: CLINIC | Age: 83
End: 2018-09-14

## 2018-09-14 DIAGNOSIS — I48.0 PAROXYSMAL ATRIAL FIBRILLATION (HCC): ICD-10-CM

## 2018-09-14 LAB — INR PPP: 2.4 (ref 0.9–1.1)

## 2018-09-14 PROCEDURE — 36416 COLLJ CAPILLARY BLOOD SPEC: CPT | Performed by: INTERNAL MEDICINE

## 2018-09-14 PROCEDURE — 85610 PROTHROMBIN TIME: CPT | Performed by: INTERNAL MEDICINE

## 2018-10-10 ENCOUNTER — EPISODE CHANGES (OUTPATIENT)
Dept: CASE MANAGEMENT | Facility: OTHER | Age: 83
End: 2018-10-10

## 2018-10-23 ENCOUNTER — TELEPHONE (OUTPATIENT)
Dept: INTERNAL MEDICINE | Facility: CLINIC | Age: 83
End: 2018-10-23

## 2018-10-26 ENCOUNTER — ANTICOAGULATION VISIT (OUTPATIENT)
Dept: INTERNAL MEDICINE | Facility: CLINIC | Age: 83
End: 2018-10-26

## 2018-10-26 DIAGNOSIS — I48.0 PAROXYSMAL ATRIAL FIBRILLATION (HCC): ICD-10-CM

## 2018-10-26 LAB — INR PPP: 1.3 (ref 0.9–1.1)

## 2018-10-26 PROCEDURE — 93793 ANTICOAG MGMT PT WARFARIN: CPT | Performed by: INTERNAL MEDICINE

## 2018-10-26 PROCEDURE — 36416 COLLJ CAPILLARY BLOOD SPEC: CPT | Performed by: INTERNAL MEDICINE

## 2018-10-26 PROCEDURE — 85610 PROTHROMBIN TIME: CPT | Performed by: INTERNAL MEDICINE

## 2018-11-01 RX ORDER — METOPROLOL SUCCINATE 50 MG/1
TABLET, EXTENDED RELEASE ORAL
Qty: 90 TABLET | Refills: 0 | Status: SHIPPED | OUTPATIENT
Start: 2018-11-01 | End: 2019-02-18 | Stop reason: SDUPTHER

## 2018-11-01 RX ORDER — WARFARIN SODIUM 5 MG/1
TABLET ORAL
Qty: 90 TABLET | Refills: 0 | Status: SHIPPED | OUTPATIENT
Start: 2018-11-01 | End: 2018-11-16 | Stop reason: SDUPTHER

## 2018-11-09 ENCOUNTER — ANTICOAGULATION VISIT (OUTPATIENT)
Dept: INTERNAL MEDICINE | Facility: CLINIC | Age: 83
End: 2018-11-09

## 2018-11-09 DIAGNOSIS — I48.0 PAROXYSMAL ATRIAL FIBRILLATION (HCC): ICD-10-CM

## 2018-11-09 LAB — INR PPP: 1.4 (ref 0.9–1.1)

## 2018-11-09 PROCEDURE — 85610 PROTHROMBIN TIME: CPT | Performed by: INTERNAL MEDICINE

## 2018-11-09 PROCEDURE — 36416 COLLJ CAPILLARY BLOOD SPEC: CPT | Performed by: INTERNAL MEDICINE

## 2018-11-09 PROCEDURE — 93793 ANTICOAG MGMT PT WARFARIN: CPT | Performed by: INTERNAL MEDICINE

## 2018-11-12 RX ORDER — DIGOXIN 250 UG/1
TABLET ORAL
Qty: 90 TABLET | Refills: 0 | Status: SHIPPED | OUTPATIENT
Start: 2018-11-12 | End: 2020-08-21

## 2018-11-19 RX ORDER — WARFARIN SODIUM 5 MG/1
TABLET ORAL
Qty: 90 TABLET | Refills: 0 | Status: SHIPPED | OUTPATIENT
Start: 2018-11-19 | End: 2018-11-30

## 2018-11-30 ENCOUNTER — ANTICOAGULATION VISIT (OUTPATIENT)
Dept: INTERNAL MEDICINE | Facility: CLINIC | Age: 83
End: 2018-11-30

## 2018-11-30 DIAGNOSIS — I48.0 PAROXYSMAL ATRIAL FIBRILLATION (HCC): ICD-10-CM

## 2018-11-30 LAB — INR PPP: 1.7 (ref 0.9–1.1)

## 2018-11-30 PROCEDURE — 85610 PROTHROMBIN TIME: CPT | Performed by: INTERNAL MEDICINE

## 2018-11-30 PROCEDURE — 36416 COLLJ CAPILLARY BLOOD SPEC: CPT | Performed by: INTERNAL MEDICINE

## 2018-11-30 RX ORDER — WARFARIN SODIUM 4 MG/1
4 TABLET ORAL DAILY
Qty: 30 TABLET | Refills: 5 | Status: SHIPPED | OUTPATIENT
Start: 2018-11-30 | End: 2019-06-18 | Stop reason: SDUPTHER

## 2019-02-18 RX ORDER — METOPROLOL SUCCINATE 50 MG/1
TABLET, EXTENDED RELEASE ORAL
Qty: 90 TABLET | Refills: 0 | Status: SHIPPED | OUTPATIENT
Start: 2019-02-18 | End: 2019-05-24 | Stop reason: SDUPTHER

## 2019-02-19 ENCOUNTER — ANTICOAGULATION VISIT (OUTPATIENT)
Dept: INTERNAL MEDICINE | Facility: CLINIC | Age: 84
End: 2019-02-19

## 2019-02-19 DIAGNOSIS — I48.0 PAROXYSMAL ATRIAL FIBRILLATION (HCC): ICD-10-CM

## 2019-02-19 PROCEDURE — 36416 COLLJ CAPILLARY BLOOD SPEC: CPT | Performed by: INTERNAL MEDICINE

## 2019-02-19 PROCEDURE — 85610 PROTHROMBIN TIME: CPT | Performed by: INTERNAL MEDICINE

## 2019-02-21 LAB — INR PPP: 2.1 (ref 0.9–1.1)

## 2019-05-24 RX ORDER — METOPROLOL SUCCINATE 50 MG/1
TABLET, EXTENDED RELEASE ORAL
Qty: 90 TABLET | Refills: 0 | Status: SHIPPED | OUTPATIENT
Start: 2019-05-24 | End: 2019-09-11 | Stop reason: SDUPTHER

## 2019-06-10 RX ORDER — ATORVASTATIN CALCIUM 20 MG/1
20 TABLET, FILM COATED ORAL DAILY
Qty: 90 TABLET | Refills: 0 | Status: SHIPPED | OUTPATIENT
Start: 2019-06-10 | End: 2019-10-03 | Stop reason: SDUPTHER

## 2019-06-18 RX ORDER — WARFARIN SODIUM 4 MG/1
4 TABLET ORAL DAILY
Qty: 30 TABLET | Refills: 0 | Status: SHIPPED | OUTPATIENT
Start: 2019-06-18 | End: 2019-07-25 | Stop reason: SDUPTHER

## 2019-07-25 RX ORDER — WARFARIN SODIUM 4 MG/1
4 TABLET ORAL DAILY
Qty: 30 TABLET | Refills: 0 | Status: SHIPPED | OUTPATIENT
Start: 2019-07-25 | End: 2019-08-29 | Stop reason: SDUPTHER

## 2019-08-29 RX ORDER — WARFARIN SODIUM 4 MG/1
4 TABLET ORAL DAILY
Qty: 30 TABLET | Refills: 0 | Status: SHIPPED | OUTPATIENT
Start: 2019-08-29 | End: 2019-10-08 | Stop reason: SDUPTHER

## 2019-09-11 RX ORDER — METOPROLOL SUCCINATE 50 MG/1
TABLET, EXTENDED RELEASE ORAL
Qty: 30 TABLET | Refills: 0 | Status: SHIPPED | OUTPATIENT
Start: 2019-09-11 | End: 2019-10-07 | Stop reason: SDUPTHER

## 2019-09-11 RX ORDER — METOPROLOL SUCCINATE 50 MG/1
TABLET, EXTENDED RELEASE ORAL
Qty: 90 TABLET | Refills: 0 | OUTPATIENT
Start: 2019-09-11

## 2019-09-27 ENCOUNTER — OFFICE VISIT (OUTPATIENT)
Dept: INTERNAL MEDICINE | Facility: CLINIC | Age: 84
End: 2019-09-27

## 2019-09-27 VITALS
OXYGEN SATURATION: 98 % | HEART RATE: 84 BPM | BODY MASS INDEX: 28.88 KG/M2 | DIASTOLIC BLOOD PRESSURE: 76 MMHG | SYSTOLIC BLOOD PRESSURE: 110 MMHG | WEIGHT: 184 LBS | HEIGHT: 67 IN | TEMPERATURE: 98.4 F

## 2019-09-27 DIAGNOSIS — Z00.00 MEDICARE ANNUAL WELLNESS VISIT, SUBSEQUENT: Primary | ICD-10-CM

## 2019-09-27 DIAGNOSIS — I48.0 PAROXYSMAL ATRIAL FIBRILLATION (HCC): ICD-10-CM

## 2019-09-27 DIAGNOSIS — E53.8 B12 DEFICIENCY: ICD-10-CM

## 2019-09-27 DIAGNOSIS — E78.5 HYPERLIPIDEMIA, UNSPECIFIED HYPERLIPIDEMIA TYPE: ICD-10-CM

## 2019-09-27 PROBLEM — M17.10 PRIMARY LOCALIZED OSTEOARTHROSIS OF LOWER LEG: Status: RESOLVED | Noted: 2017-11-27 | Resolved: 2019-09-27

## 2019-09-27 LAB — INR PPP: 1.7 (ref 0.9–1.1)

## 2019-09-27 PROCEDURE — 90662 IIV NO PRSV INCREASED AG IM: CPT | Performed by: INTERNAL MEDICINE

## 2019-09-27 PROCEDURE — 96372 THER/PROPH/DIAG INJ SC/IM: CPT | Performed by: INTERNAL MEDICINE

## 2019-09-27 PROCEDURE — 85610 PROTHROMBIN TIME: CPT | Performed by: INTERNAL MEDICINE

## 2019-09-27 PROCEDURE — G0008 ADMIN INFLUENZA VIRUS VAC: HCPCS | Performed by: INTERNAL MEDICINE

## 2019-09-27 PROCEDURE — 36416 COLLJ CAPILLARY BLOOD SPEC: CPT | Performed by: INTERNAL MEDICINE

## 2019-09-27 PROCEDURE — 99213 OFFICE O/P EST LOW 20 MIN: CPT | Performed by: INTERNAL MEDICINE

## 2019-09-27 PROCEDURE — G0439 PPPS, SUBSEQ VISIT: HCPCS | Performed by: INTERNAL MEDICINE

## 2019-09-27 RX ORDER — WARFARIN SODIUM 5 MG/1
5 TABLET ORAL EVERY OTHER DAY
COMMUNITY
End: 2020-08-25 | Stop reason: HOSPADM

## 2019-09-27 RX ORDER — FUROSEMIDE 40 MG/1
40 TABLET ORAL DAILY PRN
COMMUNITY
Start: 2019-09-05 | End: 2020-08-25 | Stop reason: HOSPADM

## 2019-09-27 RX ORDER — DIGOXIN 125 MCG
125 TABLET ORAL DAILY
COMMUNITY
Start: 2018-12-10 | End: 2020-08-21

## 2019-09-27 RX ORDER — ATORVASTATIN CALCIUM 10 MG/1
20 TABLET, FILM COATED ORAL DAILY
COMMUNITY
End: 2019-09-27

## 2019-09-27 RX ORDER — WARFARIN SODIUM 4 MG/1
TABLET ORAL
COMMUNITY
Start: 2018-11-30 | End: 2019-09-27

## 2019-09-27 RX ORDER — SPIRONOLACTONE 25 MG/1
25 TABLET ORAL DAILY
COMMUNITY
Start: 2018-12-10 | End: 2020-08-25 | Stop reason: HOSPADM

## 2019-09-27 RX ADMIN — CYANOCOBALAMIN 1000 MCG: 1000 INJECTION, SOLUTION INTRAMUSCULAR; SUBCUTANEOUS at 15:24

## 2019-09-27 NOTE — PATIENT INSTRUCTIONS
"DASH Eating Plan  DASH stands for \"Dietary Approaches to Stop Hypertension.\" The DASH eating plan is a healthy eating plan that has been shown to reduce high blood pressure (hypertension). It may also reduce your risk for type 2 diabetes, heart disease, and stroke. The DASH eating plan may also help with weight loss.  What are tips for following this plan?    General guidelines  · Avoid eating more than 2,300 mg (milligrams) of salt (sodium) a day. If you have hypertension, you may need to reduce your sodium intake to 1,500 mg a day.  · Limit alcohol intake to no more than 1 drink a day for nonpregnant women and 2 drinks a day for men. One drink equals 12 oz of beer, 5 oz of wine, or 1½ oz of hard liquor.  · Work with your health care provider to maintain a healthy body weight or to lose weight. Ask what an ideal weight is for you.  · Get at least 30 minutes of exercise that causes your heart to beat faster (aerobic exercise) most days of the week. Activities may include walking, swimming, or biking.  · Work with your health care provider or diet and nutrition specialist (dietitian) to adjust your eating plan to your individual calorie needs.  Reading food labels    · Check food labels for the amount of sodium per serving. Choose foods with less than 5 percent of the Daily Value of sodium. Generally, foods with less than 300 mg of sodium per serving fit into this eating plan.  · To find whole grains, look for the word \"whole\" as the first word in the ingredient list.  Shopping  · Buy products labeled as \"low-sodium\" or \"no salt added.\"  · Buy fresh foods. Avoid canned foods and premade or frozen meals.  Cooking  · Avoid adding salt when cooking. Use salt-free seasonings or herbs instead of table salt or sea salt. Check with your health care provider or pharmacist before using salt substitutes.  · Do not bowles foods. Cook foods using healthy methods such as baking, boiling, grilling, and broiling instead.  · Cook with " heart-healthy oils, such as olive, canola, soybean, or sunflower oil.  Meal planning  · Eat a balanced diet that includes:  ? 5 or more servings of fruits and vegetables each day. At each meal, try to fill half of your plate with fruits and vegetables.  ? Up to 6-8 servings of whole grains each day.  ? Less than 6 oz of lean meat, poultry, or fish each day. A 3-oz serving of meat is about the same size as a deck of cards. One egg equals 1 oz.  ? 2 servings of low-fat dairy each day.  ? A serving of nuts, seeds, or beans 5 times each week.  ? Heart-healthy fats. Healthy fats called Omega-3 fatty acids are found in foods such as flaxseeds and coldwater fish, like sardines, salmon, and mackerel.  · Limit how much you eat of the following:  ? Canned or prepackaged foods.  ? Food that is high in trans fat, such as fried foods.  ? Food that is high in saturated fat, such as fatty meat.  ? Sweets, desserts, sugary drinks, and other foods with added sugar.  ? Full-fat dairy products.  · Do not salt foods before eating.  · Try to eat at least 2 vegetarian meals each week.  · Eat more home-cooked food and less restaurant, buffet, and fast food.  · When eating at a restaurant, ask that your food be prepared with less salt or no salt, if possible.  What foods are recommended?  The items listed may not be a complete list. Talk with your dietitian about what dietary choices are best for you.  Grains  Whole-grain or whole-wheat bread. Whole-grain or whole-wheat pasta. Brown rice. Oatmeal. Quinoa. Bulgur. Whole-grain and low-sodium cereals. Edita bread. Low-fat, low-sodium crackers. Whole-wheat flour tortillas.  Vegetables  Fresh or frozen vegetables (raw, steamed, roasted, or grilled). Low-sodium or reduced-sodium tomato and vegetable juice. Low-sodium or reduced-sodium tomato sauce and tomato paste. Low-sodium or reduced-sodium canned vegetables.  Fruits  All fresh, dried, or frozen fruit. Canned fruit in natural juice (without  added sugar).  Meat and other protein foods  Skinless chicken or turkey. Ground chicken or turkey. Pork with fat trimmed off. Fish and seafood. Egg whites. Dried beans, peas, or lentils. Unsalted nuts, nut butters, and seeds. Unsalted canned beans. Lean cuts of beef with fat trimmed off. Low-sodium, lean deli meat.  Dairy  Low-fat (1%) or fat-free (skim) milk. Fat-free, low-fat, or reduced-fat cheeses. Nonfat, low-sodium ricotta or cottage cheese. Low-fat or nonfat yogurt. Low-fat, low-sodium cheese.  Fats and oils  Soft margarine without trans fats. Vegetable oil. Low-fat, reduced-fat, or light mayonnaise and salad dressings (reduced-sodium). Canola, safflower, olive, soybean, and sunflower oils. Avocado.  Seasoning and other foods  Herbs. Spices. Seasoning mixes without salt. Unsalted popcorn and pretzels. Fat-free sweets.  What foods are not recommended?  The items listed may not be a complete list. Talk with your dietitian about what dietary choices are best for you.  Grains  Baked goods made with fat, such as croissants, muffins, or some breads. Dry pasta or rice meal packs.  Vegetables  Creamed or fried vegetables. Vegetables in a cheese sauce. Regular canned vegetables (not low-sodium or reduced-sodium). Regular canned tomato sauce and paste (not low-sodium or reduced-sodium). Regular tomato and vegetable juice (not low-sodium or reduced-sodium). Pickles. Olives.  Fruits  Canned fruit in a light or heavy syrup. Fried fruit. Fruit in cream or butter sauce.  Meat and other protein foods  Fatty cuts of meat. Ribs. Fried meat. Villarreal. Sausage. Bologna and other processed lunch meats. Salami. Fatback. Hotdogs. Bratwurst. Salted nuts and seeds. Canned beans with added salt. Canned or smoked fish. Whole eggs or egg yolks. Chicken or turkey with skin.  Dairy  Whole or 2% milk, cream, and half-and-half. Whole or full-fat cream cheese. Whole-fat or sweetened yogurt. Full-fat cheese. Nondairy creamers. Whipped toppings.  Processed cheese and cheese spreads.  Fats and oils  Butter. Stick margarine. Lard. Shortening. Ghee. Villarreal fat. Tropical oils, such as coconut, palm kernel, or palm oil.  Seasoning and other foods  Salted popcorn and pretzels. Onion salt, garlic salt, seasoned salt, table salt, and sea salt. Worcestershire sauce. Tartar sauce. Barbecue sauce. Teriyaki sauce. Soy sauce, including reduced-sodium. Steak sauce. Canned and packaged gravies. Fish sauce. Oyster sauce. Cocktail sauce. Horseradish that you find on the shelf. Ketchup. Mustard. Meat flavorings and tenderizers. Bouillon cubes. Hot sauce and Tabasco sauce. Premade or packaged marinades. Premade or packaged taco seasonings. Relishes. Regular salad dressings.  Where to find more information:  · National Heart, Lung, and Blood [place]: www.nhlbi.nih.gov  · American Heart Association: www.heart.org  Summary  · The DASH eating plan is a healthy eating plan that has been shown to reduce high blood pressure (hypertension). It may also reduce your risk for type 2 diabetes, heart disease, and stroke.  · With the DASH eating plan, you should limit salt (sodium) intake to 2,300 mg a day. If you have hypertension, you may need to reduce your sodium intake to 1,500 mg a day.  · When on the DASH eating plan, aim to eat more fresh fruits and vegetables, whole grains, lean proteins, low-fat dairy, and heart-healthy fats.  · Work with your health care provider or diet and nutrition specialist (dietitian) to adjust your eating plan to your individual calorie needs.  This information is not intended to replace advice given to you by your health care provider. Make sure you discuss any questions you have with your health care provider.  Document Released: 12/06/2012 Document Revised: 12/11/2017 Document Reviewed: 12/11/2017  [employer] Interactive Patient Education © 2019 [employer]    Medicare Wellness  Personal Prevention Plan of Service     Date of Office Visit:   "2019  Encounter Provider:  Rupal Sutton MD  Place of Service:  [facility]  Patient Name: Brandy Han  :  1930    As part of the Medicare Wellness portion of your visit today, we are providing you with this personalized preventive plan of services (PPPS). This plan is based upon recommendations of the [place] Preventive Services Task Force (USPSTF) and the Advisory Committee on Immunization Practices (ACIP).    This lists the preventive care services that should be considered, and provides dates of when you are due. Items listed as completed are up-to-date and do not require any further intervention.    Health Maintenance   Topic Date Due   • ZOSTER VACCINE (1 of 2) 1980   • DXA SCAN  10/03/2016   • LIPID PANEL  2019   • MEDICARE ANNUAL WELLNESS  2019   • INFLUENZA VACCINE  2019   • MAMMOGRAM  2020   • TDAP/TD VACCINES (2 - Td) 01/15/2022   • PNEUMOCOCCAL VACCINES (65+ LOW/MEDIUM RISK)  Completed       No orders of the defined types were placed in this encounter.      No Follow-up on file.        DASH Eating Plan  DASH stands for \"Dietary Approaches to Stop Hypertension.\" The DASH eating plan is a healthy eating plan that has been shown to reduce high blood pressure (hypertension). It may also reduce your risk for type 2 diabetes, heart disease, and stroke. The DASH eating plan may also help with weight loss.  What are tips for following this plan?    General guidelines  · Avoid eating more than 2,300 mg (milligrams) of salt (sodium) a day. If you have hypertension, you may need to reduce your sodium intake to 1,500 mg a day.  · Limit alcohol intake to no more than 1 drink a day for nonpregnant women and 2 drinks a day for men. One drink equals 12 oz of beer, 5 oz of wine, or 1½ oz of hard liquor.  · Work with your health care provider to maintain a healthy body weight or to lose weight. Ask what an ideal weight is for " "you.  · Get at least 30 minutes of exercise that causes your heart to beat faster (aerobic exercise) most days of the week. Activities may include walking, swimming, or biking.  · Work with your health care provider or diet and nutrition specialist (dietitian) to adjust your eating plan to your individual calorie needs.  Reading food labels    · Check food labels for the amount of sodium per serving. Choose foods with less than 5 percent of the Daily Value of sodium. Generally, foods with less than 300 mg of sodium per serving fit into this eating plan.  · To find whole grains, look for the word \"whole\" as the first word in the ingredient list.  Shopping  · Buy products labeled as \"low-sodium\" or \"no salt added.\"  · Buy fresh foods. Avoid canned foods and premade or frozen meals.  Cooking  · Avoid adding salt when cooking. Use salt-free seasonings or herbs instead of table salt or sea salt. Check with your health care provider or pharmacist before using salt substitutes.  · Do not bowles foods. Cook foods using healthy methods such as baking, boiling, grilling, and broiling instead.  · Cook with heart-healthy oils, such as olive, canola, soybean, or sunflower oil.  Meal planning  · Eat a balanced diet that includes:  ? 5 or more servings of fruits and vegetables each day. At each meal, try to fill half of your plate with fruits and vegetables.  ? Up to 6-8 servings of whole grains each day.  ? Less than 6 oz of lean meat, poultry, or fish each day. A 3-oz serving of meat is about the same size as a deck of cards. One egg equals 1 oz.  ? 2 servings of low-fat dairy each day.  ? A serving of nuts, seeds, or beans 5 times each week.  ? Heart-healthy fats. Healthy fats called Omega-3 fatty acids are found in foods such as flaxseeds and coldwater fish, like sardines, salmon, and mackerel.  · Limit how much you eat of the following:  ? Canned or prepackaged foods.  ? Food that is high in trans fat, such as fried " foods.  ? Food that is high in saturated fat, such as fatty meat.  ? Sweets, desserts, sugary drinks, and other foods with added sugar.  ? Full-fat dairy products.  · Do not salt foods before eating.  · Try to eat at least 2 vegetarian meals each week.  · Eat more home-cooked food and less restaurant, buffet, and fast food.  · When eating at a restaurant, ask that your food be prepared with less salt or no salt, if possible.  What foods are recommended?  The items listed may not be a complete list. Talk with your dietitian about what dietary choices are best for you.  Grains  Whole-grain or whole-wheat bread. Whole-grain or whole-wheat pasta. Brown rice. Oatmeal. Quinoa. Bulgur. Whole-grain and low-sodium cereals. Edita bread. Low-fat, low-sodium crackers. Whole-wheat flour tortillas.  Vegetables  Fresh or frozen vegetables (raw, steamed, roasted, or grilled). Low-sodium or reduced-sodium tomato and vegetable juice. Low-sodium or reduced-sodium tomato sauce and tomato paste. Low-sodium or reduced-sodium canned vegetables.  Fruits  All fresh, dried, or frozen fruit. Canned fruit in natural juice (without added sugar).  Meat and other protein foods  Skinless chicken or turkey. Ground chicken or turkey. Pork with fat trimmed off. Fish and seafood. Egg whites. Dried beans, peas, or lentils. Unsalted nuts, nut butters, and seeds. Unsalted canned beans. Lean cuts of beef with fat trimmed off. Low-sodium, lean deli meat.  Dairy  Low-fat (1%) or fat-free (skim) milk. Fat-free, low-fat, or reduced-fat cheeses. Nonfat, low-sodium ricotta or cottage cheese. Low-fat or nonfat yogurt. Low-fat, low-sodium cheese.  Fats and oils  Soft margarine without trans fats. Vegetable oil. Low-fat, reduced-fat, or light mayonnaise and salad dressings (reduced-sodium). Canola, safflower, olive, soybean, and sunflower oils. Avocado.  Seasoning and other foods  Herbs. Spices. Seasoning mixes without salt. Unsalted popcorn and pretzels. Fat-free  sweets.  What foods are not recommended?  The items listed may not be a complete list. Talk with your dietitian about what dietary choices are best for you.  Grains  Baked goods made with fat, such as croissants, muffins, or some breads. Dry pasta or rice meal packs.  Vegetables  Creamed or fried vegetables. Vegetables in a cheese sauce. Regular canned vegetables (not low-sodium or reduced-sodium). Regular canned tomato sauce and paste (not low-sodium or reduced-sodium). Regular tomato and vegetable juice (not low-sodium or reduced-sodium). Pickles. Olives.  Fruits  Canned fruit in a light or heavy syrup. Fried fruit. Fruit in cream or butter sauce.  Meat and other protein foods  Fatty cuts of meat. Ribs. Fried meat. Villarreal. Sausage. Bologna and other processed lunch meats. Salami. Fatback. Hotdogs. Bratwurst. Salted nuts and seeds. Canned beans with added salt. Canned or smoked fish. Whole eggs or egg yolks. Chicken or turkey with skin.  Dairy  Whole or 2% milk, cream, and half-and-half. Whole or full-fat cream cheese. Whole-fat or sweetened yogurt. Full-fat cheese. Nondairy creamers. Whipped toppings. Processed cheese and cheese spreads.  Fats and oils  Butter. Stick margarine. Lard. Shortening. Ghee. Villarreal fat. Tropical oils, such as coconut, palm kernel, or palm oil.  Seasoning and other foods  Salted popcorn and pretzels. Onion salt, garlic salt, seasoned salt, table salt, and sea salt. Worcestershire sauce. Tartar sauce. Barbecue sauce. Teriyaki sauce. Soy sauce, including reduced-sodium. Steak sauce. Canned and packaged gravies. Fish sauce. Oyster sauce. Cocktail sauce. Horseradish that you find on the shelf. Ketchup. Mustard. Meat flavorings and tenderizers. Bouillon cubes. Hot sauce and Tabasco sauce. Premade or packaged marinades. Premade or packaged taco seasonings. Relishes. Regular salad dressings.  Where to find more information:  · National Heart, Lung, and Blood [place]:  www.nhlbi.nih.gov  · American Heart Association: www.heart.org  Summary  · The DASH eating plan is a healthy eating plan that has been shown to reduce high blood pressure (hypertension). It may also reduce your risk for type 2 diabetes, heart disease, and stroke.  · With the DASH eating plan, you should limit salt (sodium) intake to 2,300 mg a day. If you have hypertension, you may need to reduce your sodium intake to 1,500 mg a day.  · When on the DASH eating plan, aim to eat more fresh fruits and vegetables, whole grains, lean proteins, low-fat dairy, and heart-healthy fats.  · Work with your health care provider or diet and nutrition specialist (dietitian) to adjust your eating plan to your individual calorie needs.  This information is not intended to replace advice given to you by your health care provider. Make sure you discuss any questions you have with your health care provider.  Document Released: 12/06/2012 Document Revised: 12/11/2017 Document Reviewed: 12/11/2017  [email or web address] Interactive Patient Education © 2019 [email or web address]

## 2019-09-27 NOTE — PROGRESS NOTES
Subjective     Brandy Han is a 89 y.o. female who presents for an annual wellness visit as well as check up of htn, afib.      History of Present Illness     HLD. She is due for check of labs.  Anticoagulation for atrial fib.  Due for protime check today. See flowsheet.   B12 def.  She is due for her monthly shot.     She desires to be conservative in management and to limit OVs as much as possible.      Review of Systems   Constitutional: Positive for fatigue.   Respiratory: Negative.    Cardiovascular: Negative.        The following portions of the patient's history were reviewed and updated as appropriate: allergies, current medications, past family history, past medical history, past social history, past surgical history and problem list.  Health maintenance tab was reviewed and updated with the patient.       Patient Active Problem List    Diagnosis Date Noted   • Chronic pain of both knees 04/25/2017   • B12 deficiency 11/04/2016   • Sinus arrest 10/04/2016     Note Last Updated: 10/4/2016     Overview:   per Cardionet Monitor July 2013; s/p pacemaker     • Presence of cardiac pacemaker 10/04/2016   • Right-sided low back pain with right-sided sciatica 07/15/2016   • Venous thrombosis 06/14/2016   • Peripheral vascular disease (CMS/HCC) 06/14/2016     Note Last Updated: 6/14/2016     Description: total occlusion of the SOLO and stenosis of the LICA.     • Osteoporosis 06/14/2016     Note Last Updated: 6/14/2016     Description: s/p 10 years of Fosamax.     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       Past Medical History:   Diagnosis Date   • Anxiety    • Arterial thrombosis (CMS/HCC)    • Arthritis    • Headache    • Heart palpitations    • Syncope        Past Surgical History:   Procedure Laterality Date   • ANKLE SURGERY     • CARDIAC PACEMAKER PLACEMENT     • OTHER SURGICAL HISTORY Left      elbow surgery       Family History   Problem Relation Age of Onset   • Heart disease Mother    • Lung cancer Father    • Stroke Daughter         cerebral accident       Social History     Socioeconomic History   • Marital status:      Spouse name: Not on file   • Number of children: Not on file   • Years of education: Not on file   • Highest education level: Not on file   Tobacco Use   • Smoking status: Never Smoker   • Smokeless tobacco: Never Used   Substance and Sexual Activity   • Alcohol use: Yes     Comment: social   • Drug use: No   • Sexual activity: Defer       Current Outpatient Medications on File Prior to Visit   Medication Sig Dispense Refill   • atorvastatin (LIPITOR) 20 MG tablet TAKE 1 TABLET BY MOUTH DAILY 90 tablet 0   • bimatoprost (LUMIGAN) 0.01 % ophthalmic drops INT 1 GTT INTO  OU BID     • digoxin (LANOXIN) 125 MCG tablet Take 125 mcg by mouth Daily.     • furosemide (LASIX) 40 MG tablet Take 40 mg by mouth Daily.     • metoprolol succinate XL (TOPROL-XL) 50 MG 24 hr tablet TAKE 1 TABLET BY MOUTH DAILY 30 tablet 0   • spironolactone (ALDACTONE) 25 MG tablet Take 25 mg by mouth Daily.     • warfarin (COUMADIN) 4 MG tablet TAKE 1 TABLET BY MOUTH DAILY 30 tablet 0   • [DISCONTINUED] warfarin (COUMADIN) 4 MG tablet TK 1 T PO D     • DIGOX 250 MCG tablet TAKE 1 TABLET BY MOUTH DAILY 90 tablet 0   • triamcinolone (KENALOG) 0.1 % cream APPLY TOPICALLY TO AFFECTED AREA BID  2   • warfarin (COUMADIN) 5 MG tablet Take 4 mg by mouth Daily.     • [DISCONTINUED] atorvastatin (LIPITOR) 10 MG tablet Take 20 mg by mouth Daily.     • [DISCONTINUED] HYDROcodone-acetaminophen (NORCO) 5-325 MG per tablet Take 1 tablet by mouth Every 8 (Eight) Hours As Needed for Moderate Pain . 10 tablet 0     Current Facility-Administered Medications on File Prior to Visit   Medication Dose Route Frequency Provider Last Rate Last Dose   • cyanocobalamin injection 1,000 mcg  1,000 mcg Intramuscular Q28 Days Rupal Sutton  "MD [place]   1,000 mcg at 09/27/19 1524       No Known Allergies    Immunization History   Administered Date(s) Administered   • Flu Mist 09/14/2015   • Flu Vaccine High Dose PF 65YR+ 10/03/2016, 09/27/2019   • Pneumococcal Conjugate 13-Valent (PCV13) 10/03/2016   • Pneumococcal Polysaccharide (PPSV23) 01/01/2010   • Tdap 01/15/2012       Objective     /76 (BP Location: Left arm, Patient Position: Sitting, Cuff Size: Adult)   Pulse 84   Temp 98.4 °F (36.9 °C) (Oral)   Ht 170.2 cm (67\")   Wt 83.5 kg (184 lb)   SpO2 98%   BMI 28.82 kg/m²     Physical Exam   Constitutional: She is oriented to person, place, and time. She appears well-developed and well-nourished.   HENT:   Head: Normocephalic and atraumatic.   Right Ear: Hearing, tympanic membrane and external ear normal.   Left Ear: Hearing, tympanic membrane and external ear normal.   Nose: Nose normal.   Mouth/Throat: Oropharynx is clear and moist.   Neck: Neck supple. No thyromegaly present.   Cardiovascular: Normal rate and normal heart sounds. An irregularly irregular rhythm present.   No murmur heard.  Pulmonary/Chest: Effort normal and breath sounds normal.   Abdominal: Soft. She exhibits no distension. There is no hepatosplenomegaly. There is no tenderness.   Lymphadenopathy:     She has no cervical adenopathy.   Neurological: She is alert and oriented to person, place, and time.   Skin: Skin is warm and dry.   Psychiatric: She has a normal mood and affect. Her speech is normal and behavior is normal. Judgment and thought content normal. Cognition and memory are normal.       Assessment/Plan   Brandy was seen today for annual exam.    Diagnoses and all orders for this visit:    Medicare annual wellness visit, subsequent    Hyperlipidemia, unspecified hyperlipidemia type  -     CBC & Differential  -     Comprehensive Metabolic Panel  -     Lipid Panel  -     TSH Rfx On Abnormal To Free T4    Paroxysmal atrial fibrillation (CMS/HCC)  -     POCT INR  -     " CBC & Differential  -     Comprehensive Metabolic Panel  -     Lipid Panel  -     TSH Rfx On Abnormal To Free T4    B12 deficiency  -     CBC & Differential  -     Comprehensive Metabolic Panel  -     Lipid Panel  -     TSH Rfx On Abnormal To Free T4    Other orders  -     Fluzone High Dose =>65Years        Discussion    AWV.  See scanned forms and/or computer for davide history, PHQ-9, functional ability questionnaire, cognitive impairment screening.  Direct observation of cognitive abilities:  The patient does not exhibit any impairment in cognitive abilities upon direct observation at today's visit.     These were all reviewed with the patient and the patient was provided with a personal prevention plan of service in patient instructions.  Patient was given advice or information on the following topics:  nutrition, fall prevention  HLD. Continue current regimen.   Afib.  Rate control with pacemaker.  INR done today.  She is on low side today.  I am fine with that.  She has been diffiucult to control and not consistent about coming in.  She can't afford newer anticoagulants.   B12 def.  I advise monthly shots.   I have recommended that the patient get the following immunizations:  Shingrix.        Health Maintenance   Topic Date Due   • ZOSTER VACCINE (1 of 2) 06/01/1980   • LIPID PANEL  03/29/2019   • MAMMOGRAM  04/17/2020   • MEDICARE ANNUAL WELLNESS  09/27/2020   • TDAP/TD VACCINES (2 - Td) 01/15/2022   • INFLUENZA VACCINE  Completed   • PNEUMOCOCCAL VACCINES (65+ LOW/MEDIUM RISK)  Completed   • DXA SCAN  Discontinued            No future appointments.

## 2019-09-28 LAB
ALBUMIN SERPL-MCNC: 4.1 G/DL (ref 3.5–5.2)
ALBUMIN/GLOB SERPL: 1.4 G/DL
ALP SERPL-CCNC: 78 U/L (ref 39–117)
ALT SERPL-CCNC: 7 U/L (ref 1–33)
AST SERPL-CCNC: 14 U/L (ref 1–32)
BASOPHILS # BLD AUTO: 0.03 10*3/MM3 (ref 0–0.2)
BASOPHILS NFR BLD AUTO: 0.5 % (ref 0–1.5)
BILIRUB SERPL-MCNC: 0.6 MG/DL (ref 0.2–1.2)
BUN SERPL-MCNC: 20 MG/DL (ref 8–23)
BUN/CREAT SERPL: 21.1 (ref 7–25)
CALCIUM SERPL-MCNC: 9 MG/DL (ref 8.6–10.5)
CHLORIDE SERPL-SCNC: 102 MMOL/L (ref 98–107)
CHOLEST SERPL-MCNC: 136 MG/DL (ref 0–200)
CO2 SERPL-SCNC: 26.5 MMOL/L (ref 22–29)
CREAT SERPL-MCNC: 0.95 MG/DL (ref 0.57–1)
EOSINOPHIL # BLD AUTO: 0.16 10*3/MM3 (ref 0–0.4)
EOSINOPHIL NFR BLD AUTO: 2.7 % (ref 0.3–6.2)
ERYTHROCYTE [DISTWIDTH] IN BLOOD BY AUTOMATED COUNT: 12.1 % (ref 12.3–15.4)
GLOBULIN SER CALC-MCNC: 3 GM/DL
GLUCOSE SERPL-MCNC: 102 MG/DL (ref 65–99)
HCT VFR BLD AUTO: 45 % (ref 34–46.6)
HDLC SERPL-MCNC: 65 MG/DL (ref 40–60)
HGB BLD-MCNC: 14 G/DL (ref 12–15.9)
IMM GRANULOCYTES # BLD AUTO: 0.02 10*3/MM3 (ref 0–0.05)
IMM GRANULOCYTES NFR BLD AUTO: 0.3 % (ref 0–0.5)
LDLC SERPL CALC-MCNC: 55 MG/DL (ref 0–100)
LYMPHOCYTES # BLD AUTO: 1.95 10*3/MM3 (ref 0.7–3.1)
LYMPHOCYTES NFR BLD AUTO: 32.5 % (ref 19.6–45.3)
MCH RBC QN AUTO: 30.6 PG (ref 26.6–33)
MCHC RBC AUTO-ENTMCNC: 31.1 G/DL (ref 31.5–35.7)
MCV RBC AUTO: 98.3 FL (ref 79–97)
MONOCYTES # BLD AUTO: 0.61 10*3/MM3 (ref 0.1–0.9)
MONOCYTES NFR BLD AUTO: 10.2 % (ref 5–12)
NEUTROPHILS # BLD AUTO: 3.23 10*3/MM3 (ref 1.7–7)
NEUTROPHILS NFR BLD AUTO: 53.8 % (ref 42.7–76)
NRBC BLD AUTO-RTO: 0 /100 WBC (ref 0–0.2)
PLATELET # BLD AUTO: 194 10*3/MM3 (ref 140–450)
POTASSIUM SERPL-SCNC: 4.6 MMOL/L (ref 3.5–5.2)
PROT SERPL-MCNC: 7.1 G/DL (ref 6–8.5)
RBC # BLD AUTO: 4.58 10*6/MM3 (ref 3.77–5.28)
SODIUM SERPL-SCNC: 140 MMOL/L (ref 136–145)
TRIGL SERPL-MCNC: 81 MG/DL (ref 0–150)
TSH SERPL DL<=0.005 MIU/L-ACNC: 1.65 UIU/ML (ref 0.27–4.2)
VLDLC SERPL CALC-MCNC: 16.2 MG/DL
WBC # BLD AUTO: 6 10*3/MM3 (ref 3.4–10.8)

## 2019-10-03 RX ORDER — ATORVASTATIN CALCIUM 20 MG/1
20 TABLET, FILM COATED ORAL DAILY
Qty: 90 TABLET | Refills: 0 | Status: SHIPPED | OUTPATIENT
Start: 2019-10-03 | End: 2020-01-17

## 2019-10-08 RX ORDER — METOPROLOL SUCCINATE 50 MG/1
TABLET, EXTENDED RELEASE ORAL
Qty: 30 TABLET | Refills: 0 | Status: SHIPPED | OUTPATIENT
Start: 2019-10-08 | End: 2019-11-22 | Stop reason: SDUPTHER

## 2019-10-08 RX ORDER — WARFARIN SODIUM 4 MG/1
4 TABLET ORAL DAILY
Qty: 30 TABLET | Refills: 0 | Status: SHIPPED | OUTPATIENT
Start: 2019-10-08 | End: 2019-11-08 | Stop reason: SDUPTHER

## 2019-10-30 ENCOUNTER — TELEPHONE (OUTPATIENT)
Dept: INTERNAL MEDICINE | Facility: CLINIC | Age: 84
End: 2019-10-30

## 2019-10-30 NOTE — TELEPHONE ENCOUNTER
Patient informed. She states she will come in on Friday 11/01/2019 to have her protime checked.      ----- Message from Rupal Sutton MD sent at 10/28/2019  8:59 AM EDT -----  Let patient know  ----- Message -----  From: Rupal Sutton MD  Sent: 10/28/2019  To: Rupal Sutton MD    Due protime

## 2019-11-01 ENCOUNTER — ANTICOAGULATION VISIT (OUTPATIENT)
Dept: INTERNAL MEDICINE | Facility: CLINIC | Age: 84
End: 2019-11-01

## 2019-11-01 DIAGNOSIS — I48.0 PAROXYSMAL ATRIAL FIBRILLATION (HCC): ICD-10-CM

## 2019-11-01 LAB — INR PPP: 1.7 (ref 0.9–1.1)

## 2019-11-01 PROCEDURE — 85610 PROTHROMBIN TIME: CPT | Performed by: INTERNAL MEDICINE

## 2019-11-01 PROCEDURE — 36416 COLLJ CAPILLARY BLOOD SPEC: CPT | Performed by: INTERNAL MEDICINE

## 2019-11-04 ENCOUNTER — ANTICOAGULATION VISIT (OUTPATIENT)
Dept: INTERNAL MEDICINE | Facility: CLINIC | Age: 84
End: 2019-11-04

## 2019-11-04 DIAGNOSIS — I48.0 PAROXYSMAL ATRIAL FIBRILLATION (HCC): ICD-10-CM

## 2019-11-08 RX ORDER — WARFARIN SODIUM 4 MG/1
4 TABLET ORAL DAILY
Qty: 30 TABLET | Refills: 0 | Status: SHIPPED | OUTPATIENT
Start: 2019-11-08 | End: 2019-12-11 | Stop reason: SDUPTHER

## 2019-11-22 RX ORDER — METOPROLOL SUCCINATE 50 MG/1
TABLET, EXTENDED RELEASE ORAL
Qty: 30 TABLET | Refills: 0 | Status: SHIPPED | OUTPATIENT
Start: 2019-11-22 | End: 2020-01-09

## 2019-12-11 RX ORDER — WARFARIN SODIUM 4 MG/1
4 TABLET ORAL DAILY
Qty: 30 TABLET | Refills: 0 | Status: SHIPPED | OUTPATIENT
Start: 2019-12-11 | End: 2020-01-17

## 2020-01-09 RX ORDER — METOPROLOL SUCCINATE 50 MG/1
TABLET, EXTENDED RELEASE ORAL
Qty: 30 TABLET | Refills: 0 | Status: SHIPPED | OUTPATIENT
Start: 2020-01-09 | End: 2020-02-18

## 2020-01-17 RX ORDER — WARFARIN SODIUM 4 MG/1
4 TABLET ORAL DAILY
Qty: 30 TABLET | Refills: 0 | Status: SHIPPED | OUTPATIENT
Start: 2020-01-17 | End: 2020-02-21

## 2020-01-17 RX ORDER — ATORVASTATIN CALCIUM 20 MG/1
20 TABLET, FILM COATED ORAL DAILY
Qty: 90 TABLET | Refills: 0 | Status: SHIPPED | OUTPATIENT
Start: 2020-01-17 | End: 2020-04-27

## 2020-02-18 RX ORDER — METOPROLOL SUCCINATE 50 MG/1
TABLET, EXTENDED RELEASE ORAL
Qty: 30 TABLET | Refills: 0 | Status: SHIPPED | OUTPATIENT
Start: 2020-02-18 | End: 2020-02-19 | Stop reason: SDUPTHER

## 2020-02-19 RX ORDER — METOPROLOL SUCCINATE 50 MG/1
50 TABLET, EXTENDED RELEASE ORAL DAILY
Qty: 90 TABLET | Refills: 1 | Status: SHIPPED | OUTPATIENT
Start: 2020-02-19 | End: 2020-03-27

## 2020-02-21 RX ORDER — WARFARIN SODIUM 4 MG/1
TABLET ORAL
Qty: 30 TABLET | Refills: 0 | Status: SHIPPED | OUTPATIENT
Start: 2020-02-21 | End: 2020-03-27

## 2020-03-19 NOTE — PROGRESS NOTES
Subjective     Brandy Han is a 87 y.o. female who presents with   Chief Complaint   Patient presents with   • Hyperlipidemia     3 month   • Urinary Tract Infection     frequency, burning,pain, x 1 week       History of Present Illness     Urinary burning and frequency.  Going on for a week.  Burning with urination.      Review of Systems    The following portions of the patient's history were reviewed and updated as appropriate: allergies, current medications and problem list.    Patient Active Problem List    Diagnosis Date Noted   • Chronic pain of both knees 04/25/2017   • Arthritis 01/20/2017   • B12 deficiency 11/04/2016   • Sinus arrest 10/04/2016     Note Last Updated: 10/4/2016     Overview:   per Cardionet Monitor July 2013; s/p pacemaker     • Presence of cardiac pacemaker 10/04/2016   • Right shoulder pain 07/15/2016   • H/O fall 07/15/2016   • Lumbar spine pain 07/15/2016   • Glenohumeral arthritis 07/15/2016   • Right-sided low back pain with right-sided sciatica 07/15/2016   • Venous thrombosis 06/14/2016   • Peripheral vascular disease 06/14/2016     Note Last Updated: 6/14/2016     Description: total occlusion of the SOLO and stenosis of the LICA.     • Osteoporosis 06/14/2016     Note Last Updated: 6/14/2016     Description: s/p 10 years of Fosamax.     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 90 tablet 3   • DIGOX 250 MCG tablet TAKE 1 TABLET BY MOUTH EVERY DAY 90 tablet 0   • metoprolol succinate XL (TOPROL-XL) 50 MG 24 hr tablet Take 1 tablet by mouth Daily. 90 tablet 3   • triamcinolone (KENALOG) 0.1 % cream APPLY TOPICALLY TO AFFECTED AREA BID  2   • warfarin  "(COUMADIN) 5 MG tablet Take 1 tablet by mouth Daily. 90 tablet 3   • benzonatate (TESSALON) 200 MG capsule Take 1 capsule by mouth 3 (Three) Times a Day As Needed for Cough. 30 capsule 0     Current Facility-Administered Medications on File Prior to Visit   Medication Dose Route Frequency Provider Last Rate Last Dose   • cyanocobalamin injection 1,000 mcg  1,000 mcg Intramuscular Q28 Days Rupal Sutton MD   1,000 mcg at 08/25/17 1432       Objective     /66  Pulse 75  Resp 18  Ht 66\" (167.6 cm)  Wt 180 lb (81.6 kg)  SpO2 96%  BMI 29.05 kg/m2    Physical Exam    Assessment/Plan   Brandy was seen today for hyperlipidemia and urinary tract infection.    Diagnoses and all orders for this visit:    Acute cystitis without hematuria    Other orders  -     cefdinir (OMNICEF) 300 MG capsule; Take 1 capsule by mouth 2 (Two) Times a Day.        Discussion         Future Appointments  Date Time Provider Department Center   9/25/2017 3:15 PM MD [facility] None   11/27/2017 4:20 PM MD [facility]IL[facility] None         " 19-Mar-2020

## 2020-03-27 RX ORDER — METOPROLOL SUCCINATE 50 MG/1
TABLET, EXTENDED RELEASE ORAL
Qty: 90 TABLET | Refills: 0 | Status: SHIPPED | OUTPATIENT
Start: 2020-03-27 | End: 2020-07-21

## 2020-03-27 RX ORDER — WARFARIN SODIUM 4 MG/1
TABLET ORAL
Qty: 30 TABLET | Refills: 0 | Status: SHIPPED | OUTPATIENT
Start: 2020-03-27 | End: 2020-04-27

## 2020-04-27 RX ORDER — WARFARIN SODIUM 4 MG/1
TABLET ORAL
Qty: 30 TABLET | Refills: 0 | Status: SHIPPED | OUTPATIENT
Start: 2020-04-27 | End: 2020-06-04

## 2020-04-27 RX ORDER — ATORVASTATIN CALCIUM 20 MG/1
20 TABLET, FILM COATED ORAL DAILY
Qty: 90 TABLET | Refills: 0 | Status: SHIPPED | OUTPATIENT
Start: 2020-04-27 | End: 2020-08-07

## 2020-06-04 RX ORDER — WARFARIN SODIUM 4 MG/1
TABLET ORAL
Qty: 30 TABLET | Refills: 0 | Status: SHIPPED | OUTPATIENT
Start: 2020-06-04 | End: 2020-07-08

## 2020-07-08 ENCOUNTER — TELEPHONE (OUTPATIENT)
Dept: INTERNAL MEDICINE | Facility: CLINIC | Age: 85
End: 2020-07-08

## 2020-07-08 RX ORDER — WARFARIN SODIUM 4 MG/1
TABLET ORAL
Qty: 30 TABLET | Refills: 0 | Status: SHIPPED | OUTPATIENT
Start: 2020-07-08 | End: 2020-07-20

## 2020-07-20 RX ORDER — WARFARIN SODIUM 4 MG/1
TABLET ORAL
Qty: 30 TABLET | Refills: 0 | Status: SHIPPED | OUTPATIENT
Start: 2020-07-20 | End: 2020-08-12

## 2020-07-21 ENCOUNTER — TELEPHONE (OUTPATIENT)
Dept: INTERNAL MEDICINE | Facility: CLINIC | Age: 85
End: 2020-07-21

## 2020-07-21 RX ORDER — METOPROLOL SUCCINATE 50 MG/1
TABLET, EXTENDED RELEASE ORAL
Qty: 90 TABLET | Refills: 0 | Status: SHIPPED | OUTPATIENT
Start: 2020-07-21 | End: 2020-08-21

## 2020-07-24 ENCOUNTER — ANTICOAGULATION VISIT (OUTPATIENT)
Dept: INTERNAL MEDICINE | Facility: CLINIC | Age: 85
End: 2020-07-24

## 2020-07-24 ENCOUNTER — LAB (OUTPATIENT)
Dept: INTERNAL MEDICINE | Facility: CLINIC | Age: 85
End: 2020-07-24

## 2020-07-24 DIAGNOSIS — I48.0 PAROXYSMAL ATRIAL FIBRILLATION (HCC): ICD-10-CM

## 2020-07-24 LAB — INR PPP: 3.5 (ref 0.9–1.1)

## 2020-07-24 PROCEDURE — 85610 PROTHROMBIN TIME: CPT | Performed by: INTERNAL MEDICINE

## 2020-07-24 PROCEDURE — 36416 COLLJ CAPILLARY BLOOD SPEC: CPT | Performed by: INTERNAL MEDICINE

## 2020-07-24 PROCEDURE — 93793 ANTICOAG MGMT PT WARFARIN: CPT | Performed by: INTERNAL MEDICINE

## 2020-08-07 ENCOUNTER — ANTICOAGULATION VISIT (OUTPATIENT)
Dept: INTERNAL MEDICINE | Facility: CLINIC | Age: 85
End: 2020-08-07

## 2020-08-07 DIAGNOSIS — I48.0 PAROXYSMAL ATRIAL FIBRILLATION (HCC): ICD-10-CM

## 2020-08-07 LAB — INR PPP: 2.8 (ref 0.9–1.1)

## 2020-08-07 PROCEDURE — 93793 ANTICOAG MGMT PT WARFARIN: CPT | Performed by: INTERNAL MEDICINE

## 2020-08-07 PROCEDURE — 85610 PROTHROMBIN TIME: CPT | Performed by: INTERNAL MEDICINE

## 2020-08-07 PROCEDURE — 36416 COLLJ CAPILLARY BLOOD SPEC: CPT | Performed by: INTERNAL MEDICINE

## 2020-08-07 RX ORDER — ATORVASTATIN CALCIUM 20 MG/1
20 TABLET, FILM COATED ORAL DAILY
Qty: 90 TABLET | Refills: 0 | Status: SHIPPED | OUTPATIENT
Start: 2020-08-07 | End: 2020-11-20

## 2020-08-12 RX ORDER — WARFARIN SODIUM 4 MG/1
TABLET ORAL
Qty: 30 TABLET | Refills: 0 | Status: SHIPPED | OUTPATIENT
Start: 2020-08-12 | End: 2020-08-21

## 2020-08-21 ENCOUNTER — APPOINTMENT (OUTPATIENT)
Dept: CT IMAGING | Facility: HOSPITAL | Age: 85
End: 2020-08-21

## 2020-08-21 ENCOUNTER — HOSPITAL ENCOUNTER (INPATIENT)
Facility: HOSPITAL | Age: 85
LOS: 3 days | Discharge: NURSING, REHAB OR LONG-TERM CARE | End: 2020-08-25
Attending: EMERGENCY MEDICINE | Admitting: INTERNAL MEDICINE

## 2020-08-21 ENCOUNTER — APPOINTMENT (OUTPATIENT)
Dept: GENERAL RADIOLOGY | Facility: HOSPITAL | Age: 85
End: 2020-08-21

## 2020-08-21 DIAGNOSIS — S12.9XXA CLOSED FRACTURE OF CERVICAL VERTEBRA, UNSPECIFIED CERVICAL VERTEBRAL LEVEL, INITIAL ENCOUNTER (HCC): ICD-10-CM

## 2020-08-21 DIAGNOSIS — S09.90XA MINOR HEAD INJURY, INITIAL ENCOUNTER: ICD-10-CM

## 2020-08-21 DIAGNOSIS — T79.6XXA TRAUMATIC RHABDOMYOLYSIS, INITIAL ENCOUNTER (HCC): Primary | ICD-10-CM

## 2020-08-21 DIAGNOSIS — Z79.01 CHRONIC ANTICOAGULATION: ICD-10-CM

## 2020-08-21 PROBLEM — S12.500A CLOSED FRACTURE OF SIXTH CERVICAL VERTEBRA (HCC): Status: ACTIVE | Noted: 2020-08-21

## 2020-08-21 PROBLEM — M62.82 NON-TRAUMATIC RHABDOMYOLYSIS: Status: ACTIVE | Noted: 2020-08-21

## 2020-08-21 LAB
ALBUMIN SERPL-MCNC: 3.5 G/DL (ref 3.5–5.2)
ALBUMIN/GLOB SERPL: 1.3 G/DL
ALP SERPL-CCNC: 60 U/L (ref 39–117)
ALT SERPL W P-5'-P-CCNC: 15 U/L (ref 1–33)
ANION GAP SERPL CALCULATED.3IONS-SCNC: 10.1 MMOL/L (ref 5–15)
AST SERPL-CCNC: 43 U/L (ref 1–32)
BACTERIA UR QL AUTO: ABNORMAL /HPF
BASOPHILS # BLD AUTO: 0.02 10*3/MM3 (ref 0–0.2)
BASOPHILS NFR BLD AUTO: 0.2 % (ref 0–1.5)
BILIRUB SERPL-MCNC: 1.3 MG/DL (ref 0–1.2)
BILIRUB UR QL STRIP: NEGATIVE
BUN SERPL-MCNC: 18 MG/DL (ref 8–23)
BUN/CREAT SERPL: 17.1 (ref 7–25)
CALCIUM SPEC-SCNC: 8.3 MG/DL (ref 8.2–9.6)
CHLORIDE SERPL-SCNC: 104 MMOL/L (ref 98–107)
CK SERPL-CCNC: 2704 U/L (ref 20–180)
CLARITY UR: CLEAR
CO2 SERPL-SCNC: 22.9 MMOL/L (ref 22–29)
COLOR UR: YELLOW
CREAT SERPL-MCNC: 1.05 MG/DL (ref 0.57–1)
DEPRECATED RDW RBC AUTO: 42.2 FL (ref 37–54)
DIGOXIN SERPL-MCNC: 0.6 NG/ML (ref 0.6–1.2)
EOSINOPHIL # BLD AUTO: 0.01 10*3/MM3 (ref 0–0.4)
EOSINOPHIL NFR BLD AUTO: 0.1 % (ref 0.3–6.2)
ERYTHROCYTE [DISTWIDTH] IN BLOOD BY AUTOMATED COUNT: 12.1 % (ref 12.3–15.4)
GFR SERPL CREATININE-BSD FRML MDRD: 49 ML/MIN/1.73
GLOBULIN UR ELPH-MCNC: 2.6 GM/DL
GLUCOSE BLDC GLUCOMTR-MCNC: 96 MG/DL (ref 70–130)
GLUCOSE SERPL-MCNC: 145 MG/DL (ref 65–99)
GLUCOSE UR STRIP-MCNC: NEGATIVE MG/DL
HCT VFR BLD AUTO: 40.7 % (ref 34–46.6)
HGB BLD-MCNC: 13.6 G/DL (ref 12–15.9)
HGB UR QL STRIP.AUTO: ABNORMAL
HOLD SPECIMEN: NORMAL
HOLD SPECIMEN: NORMAL
HYALINE CASTS UR QL AUTO: ABNORMAL /LPF
IMM GRANULOCYTES # BLD AUTO: 0.12 10*3/MM3 (ref 0–0.05)
IMM GRANULOCYTES NFR BLD AUTO: 1.1 % (ref 0–0.5)
INR PPP: 3.3 (ref 0.9–1.1)
KETONES UR QL STRIP: NEGATIVE
LEUKOCYTE ESTERASE UR QL STRIP.AUTO: NEGATIVE
LYMPHOCYTES # BLD AUTO: 0.53 10*3/MM3 (ref 0.7–3.1)
LYMPHOCYTES NFR BLD AUTO: 4.8 % (ref 19.6–45.3)
MAGNESIUM SERPL-MCNC: 1.8 MG/DL (ref 1.6–2.4)
MCH RBC QN AUTO: 31.6 PG (ref 26.6–33)
MCHC RBC AUTO-ENTMCNC: 33.4 G/DL (ref 31.5–35.7)
MCV RBC AUTO: 94.7 FL (ref 79–97)
MONOCYTES # BLD AUTO: 0.66 10*3/MM3 (ref 0.1–0.9)
MONOCYTES NFR BLD AUTO: 6 % (ref 5–12)
NEUTROPHILS NFR BLD AUTO: 87.8 % (ref 42.7–76)
NEUTROPHILS NFR BLD AUTO: 9.61 10*3/MM3 (ref 1.7–7)
NITRITE UR QL STRIP: NEGATIVE
NRBC BLD AUTO-RTO: 0 /100 WBC (ref 0–0.2)
PH UR STRIP.AUTO: 6 [PH] (ref 5–8)
PLATELET # BLD AUTO: 148 10*3/MM3 (ref 140–450)
PMV BLD AUTO: 10.1 FL (ref 6–12)
POTASSIUM SERPL-SCNC: 3.8 MMOL/L (ref 3.5–5.2)
PROT SERPL-MCNC: 6.1 G/DL (ref 6–8.5)
PROT UR QL STRIP: ABNORMAL
PROTHROMBIN TIME: 32.4 SECONDS (ref 11.7–14.2)
RBC # BLD AUTO: 4.3 10*6/MM3 (ref 3.77–5.28)
RBC # UR: ABNORMAL /HPF
REF LAB TEST METHOD: ABNORMAL
SODIUM SERPL-SCNC: 137 MMOL/L (ref 136–145)
SP GR UR STRIP: 1.01 (ref 1–1.03)
SQUAMOUS #/AREA URNS HPF: ABNORMAL /HPF
TROPONIN T SERPL-MCNC: <0.01 NG/ML (ref 0–0.03)
UROBILINOGEN UR QL STRIP: ABNORMAL
WBC # BLD AUTO: 10.95 10*3/MM3 (ref 3.4–10.8)
WBC UR QL AUTO: ABNORMAL /HPF
WHOLE BLOOD HOLD SPECIMEN: NORMAL
WHOLE BLOOD HOLD SPECIMEN: NORMAL

## 2020-08-21 PROCEDURE — 99285 EMERGENCY DEPT VISIT HI MDM: CPT

## 2020-08-21 PROCEDURE — 82550 ASSAY OF CK (CPK): CPT | Performed by: EMERGENCY MEDICINE

## 2020-08-21 PROCEDURE — 72125 CT NECK SPINE W/O DYE: CPT

## 2020-08-21 PROCEDURE — G0378 HOSPITAL OBSERVATION PER HR: HCPCS

## 2020-08-21 PROCEDURE — 80162 ASSAY OF DIGOXIN TOTAL: CPT | Performed by: EMERGENCY MEDICINE

## 2020-08-21 PROCEDURE — 82962 GLUCOSE BLOOD TEST: CPT

## 2020-08-21 PROCEDURE — U0004 COV-19 TEST NON-CDC HGH THRU: HCPCS | Performed by: EMERGENCY MEDICINE

## 2020-08-21 PROCEDURE — 85610 PROTHROMBIN TIME: CPT | Performed by: EMERGENCY MEDICINE

## 2020-08-21 PROCEDURE — 85025 COMPLETE CBC W/AUTO DIFF WBC: CPT | Performed by: EMERGENCY MEDICINE

## 2020-08-21 PROCEDURE — 80053 COMPREHEN METABOLIC PANEL: CPT | Performed by: EMERGENCY MEDICINE

## 2020-08-21 PROCEDURE — 93010 ELECTROCARDIOGRAM REPORT: CPT | Performed by: INTERNAL MEDICINE

## 2020-08-21 PROCEDURE — 81001 URINALYSIS AUTO W/SCOPE: CPT | Performed by: EMERGENCY MEDICINE

## 2020-08-21 PROCEDURE — 84484 ASSAY OF TROPONIN QUANT: CPT | Performed by: EMERGENCY MEDICINE

## 2020-08-21 PROCEDURE — 83735 ASSAY OF MAGNESIUM: CPT | Performed by: EMERGENCY MEDICINE

## 2020-08-21 PROCEDURE — 70450 CT HEAD/BRAIN W/O DYE: CPT

## 2020-08-21 PROCEDURE — 71045 X-RAY EXAM CHEST 1 VIEW: CPT

## 2020-08-21 PROCEDURE — 93005 ELECTROCARDIOGRAM TRACING: CPT | Performed by: EMERGENCY MEDICINE

## 2020-08-21 RX ORDER — HYDROMORPHONE HYDROCHLORIDE 1 MG/ML
0.5 INJECTION, SOLUTION INTRAMUSCULAR; INTRAVENOUS; SUBCUTANEOUS
Status: DISCONTINUED | OUTPATIENT
Start: 2020-08-21 | End: 2020-08-25

## 2020-08-21 RX ORDER — SODIUM CHLORIDE 0.9 % (FLUSH) 0.9 %
10 SYRINGE (ML) INJECTION AS NEEDED
Status: DISCONTINUED | OUTPATIENT
Start: 2020-08-21 | End: 2020-08-25 | Stop reason: HOSPADM

## 2020-08-21 RX ORDER — DIGOXIN 250 MCG
250 TABLET ORAL
Status: ON HOLD | COMMUNITY
End: 2022-09-08

## 2020-08-21 RX ORDER — NITROGLYCERIN 0.4 MG/1
0.4 TABLET SUBLINGUAL
Status: DISCONTINUED | OUTPATIENT
Start: 2020-08-21 | End: 2020-08-25 | Stop reason: HOSPADM

## 2020-08-21 RX ORDER — CALCIUM CARBONATE 200(500)MG
2 TABLET,CHEWABLE ORAL 2 TIMES DAILY PRN
Status: DISCONTINUED | OUTPATIENT
Start: 2020-08-21 | End: 2020-08-25 | Stop reason: HOSPADM

## 2020-08-21 RX ORDER — ONDANSETRON 4 MG/1
4 TABLET, FILM COATED ORAL EVERY 6 HOURS PRN
Status: DISCONTINUED | OUTPATIENT
Start: 2020-08-21 | End: 2020-08-25 | Stop reason: HOSPADM

## 2020-08-21 RX ORDER — SODIUM CHLORIDE 9 MG/ML
125 INJECTION, SOLUTION INTRAVENOUS CONTINUOUS
Status: DISCONTINUED | OUTPATIENT
Start: 2020-08-21 | End: 2020-08-21

## 2020-08-21 RX ORDER — METOPROLOL SUCCINATE 50 MG/1
50 TABLET, EXTENDED RELEASE ORAL DAILY
COMMUNITY
End: 2020-11-09 | Stop reason: SDUPTHER

## 2020-08-21 RX ORDER — ACETAMINOPHEN 650 MG/1
650 SUPPOSITORY RECTAL EVERY 4 HOURS PRN
Status: DISCONTINUED | OUTPATIENT
Start: 2020-08-21 | End: 2020-08-25 | Stop reason: HOSPADM

## 2020-08-21 RX ORDER — DOCUSATE SODIUM 100 MG/1
100 CAPSULE, LIQUID FILLED ORAL 2 TIMES DAILY
Status: DISCONTINUED | OUTPATIENT
Start: 2020-08-21 | End: 2020-08-25 | Stop reason: HOSPADM

## 2020-08-21 RX ORDER — ONDANSETRON 2 MG/ML
4 INJECTION INTRAMUSCULAR; INTRAVENOUS EVERY 6 HOURS PRN
Status: DISCONTINUED | OUTPATIENT
Start: 2020-08-21 | End: 2020-08-25 | Stop reason: HOSPADM

## 2020-08-21 RX ORDER — SODIUM CHLORIDE 9 MG/ML
100 INJECTION, SOLUTION INTRAVENOUS CONTINUOUS
Status: DISCONTINUED | OUTPATIENT
Start: 2020-08-21 | End: 2020-08-23

## 2020-08-21 RX ORDER — SODIUM CHLORIDE 0.9 % (FLUSH) 0.9 %
10 SYRINGE (ML) INJECTION EVERY 12 HOURS SCHEDULED
Status: DISCONTINUED | OUTPATIENT
Start: 2020-08-21 | End: 2020-08-25 | Stop reason: HOSPADM

## 2020-08-21 RX ORDER — CHOLECALCIFEROL (VITAMIN D3) 125 MCG
5 CAPSULE ORAL NIGHTLY PRN
Status: DISCONTINUED | OUTPATIENT
Start: 2020-08-21 | End: 2020-08-25 | Stop reason: HOSPADM

## 2020-08-21 RX ORDER — ACETAMINOPHEN 325 MG/1
650 TABLET ORAL EVERY 4 HOURS PRN
Status: DISCONTINUED | OUTPATIENT
Start: 2020-08-21 | End: 2020-08-25 | Stop reason: HOSPADM

## 2020-08-21 RX ORDER — ACETAMINOPHEN 160 MG/5ML
650 SOLUTION ORAL EVERY 4 HOURS PRN
Status: DISCONTINUED | OUTPATIENT
Start: 2020-08-21 | End: 2020-08-25 | Stop reason: HOSPADM

## 2020-08-21 RX ORDER — BISACODYL 5 MG/1
5 TABLET, DELAYED RELEASE ORAL DAILY PRN
Status: DISCONTINUED | OUTPATIENT
Start: 2020-08-21 | End: 2020-08-25 | Stop reason: HOSPADM

## 2020-08-21 RX ORDER — WARFARIN SODIUM 4 MG/1
4 TABLET ORAL EVERY OTHER DAY
COMMUNITY
End: 2020-08-25 | Stop reason: HOSPADM

## 2020-08-21 RX ORDER — HYDROCODONE BITARTRATE AND ACETAMINOPHEN 5; 325 MG/1; MG/1
1 TABLET ORAL EVERY 4 HOURS PRN
Status: DISCONTINUED | OUTPATIENT
Start: 2020-08-21 | End: 2020-08-25 | Stop reason: HOSPADM

## 2020-08-21 RX ORDER — NALOXONE HCL 0.4 MG/ML
0.4 VIAL (ML) INJECTION
Status: DISCONTINUED | OUTPATIENT
Start: 2020-08-21 | End: 2020-08-25 | Stop reason: HOSPADM

## 2020-08-21 RX ADMIN — SODIUM CHLORIDE 75 ML/HR: 9 INJECTION, SOLUTION INTRAVENOUS at 21:38

## 2020-08-21 RX ADMIN — SODIUM CHLORIDE 125 ML/HR: 9 INJECTION, SOLUTION INTRAVENOUS at 18:35

## 2020-08-21 RX ADMIN — SODIUM CHLORIDE, PRESERVATIVE FREE 10 ML: 5 INJECTION INTRAVENOUS at 21:38

## 2020-08-21 RX ADMIN — SODIUM CHLORIDE 500 ML: 9 INJECTION, SOLUTION INTRAVENOUS at 17:32

## 2020-08-21 NOTE — ED NOTES
Pt reports general weakness which led to a fall today. Pt reports neck pain radiating down the left shoulder.     Face mask put on pt at triage.     Jie Price RN  08/21/20 4190

## 2020-08-21 NOTE — ED NOTES
This RN in appropriate PPE for all patient care interactions. Pt masked and redirected for proper mask use when necessary. Hand hygiene performed before and after all patient care interactions.     Spenser Cerda, RN  08/21/20 4718

## 2020-08-21 NOTE — PROGRESS NOTES
Clinical Pharmacy Services: Medication History    Brandy Han is a 89 y.o. female presenting to [facility] for   Chief Complaint   Patient presents with   • Weakness - Generalized   • Fall       She  has a past medical history of Anxiety, Arterial thrombosis (CMS/HCC), Arthritis, Headache, Heart palpitations, and Syncope.    Allergies as of 08/21/2020   • (No Known Allergies)       Medication information was obtained from: patient's family  Pharmacy and Phone Number:     Prior to Admission Medications     Prescriptions Last Dose Informant Patient Reported? Taking?    atorvastatin (LIPITOR) 20 MG tablet  Self No Yes    TAKE 1 TABLET BY MOUTH DAILY    digoxin (LANOXIN) 250 MCG tablet  Self Yes Yes    Take 250 mcg by mouth Daily.    furosemide (LASIX) 40 MG tablet  Self Yes Yes    Take 40 mg by mouth Daily As Needed.    metoprolol succinate XL (TOPROL-XL) 50 MG 24 hr tablet  Self Yes Yes    Take 50 mg by mouth Daily.    spironolactone (ALDACTONE) 25 MG tablet  Pharmacy Yes Yes    Take 25 mg by mouth Daily.    warfarin (COUMADIN) 4 MG tablet  Self Yes Yes    Take 4 mg by mouth Every Other Day. Alternate days with 5mg tab    warfarin (COUMADIN) 5 MG tablet  Self Yes Yes    Take 5 mg by mouth Every Other Day. Alternate days with 4mg tabs    cyanocobalamin injection 1,000 mcg   No No            Medication notes: patient did get an antibiotic sample from her urologist Dyllan Louise, but cannot remember the name of it.    This medication list is complete to the best of my knowledge as of 8/21/2020    Please call if questions.    [facility]  Medication History Technician  [phone]    8/21/2020 19:32

## 2020-08-21 NOTE — ED NOTES
"Nursing report ED to floor  Brandy Han  89 y.o.  female    HPI (triage note):   Chief Complaint   Patient presents with   • Weakness - Generalized   • Fall       Admitting doctor:   Santiago Leonardo MD    Admitting diagnosis:   The primary encounter diagnosis was Traumatic rhabdomyolysis, initial encounter (CMS/[facility]). Diagnoses of Minor head injury, initial encounter, Chronic anticoagulation, and Closed fracture of cervical vertebra, unspecified cervical vertebral level, initial encounter (CMS/[facility]) were also pertinent to this visit.    Code status:   Current Code Status     Date Active Code Status Order ID Comments User Context       8/21/2020 1930 CPR [id number]  Santiago Leonardo MD ED       Questions for Current Code Status     Question Answer Comment    Code Status CPR     Medical Interventions (Level of Support Prior to Arrest) Full     Level Of Support Discussed With Patient           Allergies:   Patient has no known allergies.    Weight:       08/21/20  1716   Weight: 83.5 kg (184 lb)       Most recent vitals:   Vitals:    08/21/20 1700 08/21/20 1716 08/21/20 1830 08/21/20 1930   BP: 105/61  110/60 107/96   Pulse:   86 81   Resp:       Temp:       SpO2: 93%  90% 92%   Weight:  83.5 kg (184 lb)     Height:  170.2 cm (67\")         Active LDAs/IV Access:   Lines, Drains & Airways    Active LDAs     Name:   Placement date:   Placement time:   Site:   Days:    Peripheral IV 08/21/20 1557 Left Forearm   08/21/20    1557    Forearm   less than 1    External Urinary Catheter   08/21/20    1850    --   less than 1                Labs (abnormal labs have a star):   Labs Reviewed   URINALYSIS W/ MICROSCOPIC IF INDICATED (NO CULTURE) - Abnormal; Notable for the following components:       Result Value    Blood, UA Large (3+) (*)     Protein, UA 30 mg/dL (1+) (*)     All other components within normal limits   PROTIME-INR - Abnormal; Notable for the following components:    Protime 32.4 (*)     INR 3.30 (*)     All " other components within normal limits   CBC WITH AUTO DIFFERENTIAL - Abnormal; Notable for the following components:    WBC 10.95 (*)     RDW 12.1 (*)     Neutrophil % 87.8 (*)     Lymphocyte % 4.8 (*)     Eosinophil % 0.1 (*)     Immature Grans % 1.1 (*)     Neutrophils, Absolute 9.61 (*)     Lymphocytes, Absolute 0.53 (*)     Immature Grans, Absolute 0.12 (*)     All other components within normal limits   COMPREHENSIVE METABOLIC PANEL - Abnormal; Notable for the following components:    Glucose 145 (*)     Creatinine 1.05 (*)     AST (SGOT) 43 (*)     Total Bilirubin 1.3 (*)     eGFR Non  Amer 49 (*)     All other components within normal limits    Narrative:     GFR Normal >60  Chronic Kidney Disease <60  Kidney Failure <15     CK - Abnormal; Notable for the following components:    Creatine Kinase 2,704 (*)     All other components within normal limits   URINALYSIS, MICROSCOPIC ONLY - Abnormal; Notable for the following components:    RBC, UA 3-5 (*)     All other components within normal limits   DIGOXIN LEVEL - Normal   MAGNESIUM - Normal   TROPONIN (IN-HOUSE) - Normal    Narrative:     Troponin T Reference Range:  <= 0.03 ng/mL-   Negative for AMI  >0.03 ng/mL-     Abnormal for myocardial necrosis.  Clinicians would have to utilize clinical acumen, EKG, Troponin and serial changes to determine if it is an Acute Myocardial Infarction or myocardial injury due to an underlying chronic condition.       Results may be falsely decreased if patient taking Biotin.     POCT GLUCOSE FINGERSTICK - Normal   COVID PRE-OP / PRE-PROCEDURE SCREENING ORDER (NO ISOLATION)    Narrative:     The following orders were created for panel order COVID PRE-OP / PRE-PROCEDURE SCREENING ORDER (NO ISOLATION) - Swab, Nasopharynx.  Procedure                               Abnormality         Status                     ---------                               -----------         ------                     COVID-19,BIOTAP, NP/OP  S...[[id number]]                                                   Please view results for these tests on the individual orders.   COVID-19,BIOTAP, NP/OP SWAB IN TRANSPORT MEDIA OR SALINE 24-36 HR TAT   RAINBOW DRAW    Narrative:     The following orders were created for panel order [place] Draw.  Procedure                               Abnormality         Status                     ---------                               -----------         ------                     Light Blue Top[[id number]]                                   Final result               Green Top (Gel)[[id number]]                                  Final result               Lavender Top[[id number]]                                     Final result               Gold Top - SST[[id number]]                                   Final result                 Please view results for these tests on the individual orders.   POCT GLUCOSE FINGERSTICK   CBC AND DIFFERENTIAL    Narrative:     The following orders were created for panel order CBC & Differential.  Procedure                               Abnormality         Status                     ---------                               -----------         ------                     CBC Auto Differential[[id number]]        Abnormal            Final result                 Please view results for these tests on the individual orders.   LIGHT BLUE TOP   GREEN TOP   LAVENDER TOP   GOLD TOP - SST       EKG:   ECG 12 Lead   Preliminary Result   HEART RATE= 93  bpm   RR Interval= 645  ms   VT Interval=   ms   P Horizontal Axis=   deg   P Front Axis=   deg   QRSD Interval= 116  ms   QT Interval= 368  ms   QRS Axis= -44  deg   T Wave Axis= 100  deg   - ABNORMAL ECG -   Atrial fibrillation   Left anterior fascicular block   LVH with secondary repolarization abnormality   Anterior Q waves, possibly due to LVH   Electronically Signed By:    Date and Time of Study: 2020-08-21 17:12:19          Meds given in ED:   Medications   sodium  chloride 0.9 % flush 10 mL (has no administration in time range)   sodium chloride 0.9 % infusion (125 mL/hr Intravenous New Bag 8/21/20 1835)   sodium chloride 0.9 % flush 10 mL (has no administration in time range)   sodium chloride 0.9 % flush 10 mL (has no administration in time range)   nitroglycerin (NITROSTAT) SL tablet 0.4 mg (has no administration in time range)   sodium chloride 0.9 % infusion (has no administration in time range)   acetaminophen (TYLENOL) tablet 650 mg (has no administration in time range)     Or   acetaminophen (TYLENOL) 160 MG/5ML solution 650 mg (has no administration in time range)     Or   acetaminophen (TYLENOL) suppository 650 mg (has no administration in time range)   HYDROcodone-acetaminophen (NORCO) 5-325 MG per tablet 1 tablet (has no administration in time range)   HYDROmorphone (DILAUDID) injection 0.5 mg (has no administration in time range)     And   naloxone (NARCAN) injection 0.4 mg (has no administration in time range)   melatonin tablet 5 mg (has no administration in time range)   bisacodyl (DULCOLAX) EC tablet 5 mg (has no administration in time range)   docusate sodium (COLACE) capsule 100 mg (has no administration in time range)   ondansetron (ZOFRAN) tablet 4 mg (has no administration in time range)     Or   ondansetron (ZOFRAN) injection 4 mg (has no administration in time range)   calcium carbonate (TUMS) chewable tablet 500 mg (200 mg elemental) (has no administration in time range)   sodium chloride 0.9 % bolus 500 mL (0 mL Intravenous Stopped 8/21/20 1830)       Imaging results:  Xr Chest 1 View    Result Date: 8/21/2020  Mild interstitial prominence appears slightly more severe than on previous studies. This could indicate some worsening of interstitial fibrosis versus some mild superimposed interstitial edema. Please correlate. 2. Mild cardiomegaly.  This report was finalized on 8/21/2020 5:47 PM by Dr. Ander Cast M.D.        Ambulatory status:   -  bedrest    Social issues:   Social History     Socioeconomic History   • Marital status:      Spouse name: Not on file   • Number of children: Not on file   • Years of education: Not on file   • Highest education level: Not on file   Tobacco Use   • Smoking status: Never Smoker   • Smokeless tobacco: Never Used   Substance and Sexual Activity   • Alcohol use: Yes     Alcohol/week: 1.0 standard drinks     Types: 1 Shots of liquor per week   • Drug use: No   • Sexual activity: Es Luong RN  08/21/20 1936

## 2020-08-21 NOTE — ED PROVIDER NOTES
EMERGENCY DEPARTMENT ENCOUNTER    Room Number:  N541/1  Date seen:  8/21/2020  PCP: Rupal Sutton MD  Historian: Patient      HPI:  Chief Complaint: Fall, head and neck pain  A complete HPI/ROS/PMH/PSH/SH/FH are unobtainable due to: Nothing  Context: Brandy Han is a 89 y.o. female who presents to the ED c/o neck pain after she fell earlier.  She reports that she was going to the bathroom and when she was trying to stand up, she lost her balance, fell, hit her head on a linen closet.  She denies loss of consciousness.  She laid in the floor for about 3 hours until family arrived and helped her up.  She has a history of atrial fibrillation and is anticoagulated with warfarin.  She is complaining of headache, but actually had a migraine headache prior to the fall.  She took 2 extra strength Tylenol prior to arrival with some relief.  She is declining additional pain medication at this time.  She is also complaining of neck pain that radiates to the left shoulder.  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  She denies any back pain, chest pain, abdominal pain, lower extremity pain.  She reports she was recently treated for a urinary tract infection.  Dr. Usama Louise is her urologist.            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   • B12 deficiency 11/04/2016   • Chronic pain of both knees 04/25/2017     Resolved Ambulatory Problems     Diagnosis Date Noted   • Pain 04/14/2016   • Knee pain, acute 05/24/2016   • Depression 06/14/2016   •  Right shoulder pain 07/15/2016   • H/O fall 07/15/2016   • Lumbar spine pain 07/15/2016   • Glenohumeral arthritis 07/15/2016   • Arthritis 01/20/2017   • Primary localized osteoarthrosis of lower leg 11/27/2017     Past Medical History:   Diagnosis Date   • Anxiety    • Arterial thrombosis (CMS/HCC)    • Atrial fibrillation (CMS/HCC)    • Headache    • Heart palpitations    • Syncope          PAST SURGICAL HISTORY  Past Surgical History:   Procedure Laterality Date   • ANKLE SURGERY     • CARDIAC PACEMAKER PLACEMENT     • OTHER SURGICAL HISTORY Left     elbow surgery   • PACEMAKER IMPLANTATION           FAMILY HISTORY  Family History   Problem Relation Age of Onset   • Heart disease Mother    • Lung cancer Father    • Stroke Daughter         cerebral accident         SOCIAL HISTORY  Social History     Socioeconomic History   • Marital status:      Spouse name: Not on file   • Number of children: Not on file   • Years of education: Not on file   • Highest education level: Not on file   Tobacco Use   • Smoking status: Never Smoker   • Smokeless tobacco: Never Used   Substance and Sexual Activity   • Alcohol use: Yes     Alcohol/week: 1.0 standard drinks     Types: 1 Shots of liquor per week   • Drug use: No   • Sexual activity: Defer         ALLERGIES  Patient has no known allergies.        REVIEW OF SYSTEMS  Review of Systems   Review of all 14 systems is negative other than stated in the HPI above.      PHYSICAL EXAM  ED Triage Vitals [08/21/20 1556]   Temp Heart Rate Resp BP SpO2   98.7 °F (37.1 °C) 101 18 102/57 98 %      Temp src Heart Rate Source Patient Position BP Location FiO2 (%)   -- -- -- -- --         GENERAL: Awake and alert, no acute distress  HENT: nares patent, small scalp hematoma on the vertex of the scalp  EYES: no scleral icterus, pupils 3 mm reactive bilaterally, EOMI, subtle subconjunctival hemorrhage left eye  CV: regular rhythm, normal rate, pacemaker left anterior chest  wall  RESPIRATORY: normal effort, lungs clear to auscultation bilaterally  ABDOMEN: soft, nondistended, nontender throughout  MUSCULOSKELETAL: no deformity.  Cervical collar in place with mild cervical spine tenderness, no step-off.  There is no point tenderness throughout bilateral upper, bilateral lower extremities.  The pelvis is stable and nontender.  NEURO: alert, moves all extremities with normal strength, follows commands  PSYCH:  calm, cooperative  SKIN: warm, dry    Vital signs and nursing notes reviewed.          LAB RESULTS  Recent Results (from the past 24 hour(s))   Protime-INR    Collection Time: 08/21/20  4:51 PM   Result Value Ref Range    Protime 32.4 (H) 11.7 - 14.2 Seconds    INR 3.30 (H) 0.90 - 1.10   Light Blue Top    Collection Time: 08/21/20  4:51 PM   Result Value Ref Range    Extra Tube hold for add-on    Green Top (Gel)    Collection Time: 08/21/20  4:51 PM   Result Value Ref Range    Extra Tube Hold for add-ons.    Lavender Top    Collection Time: 08/21/20  4:51 PM   Result Value Ref Range    Extra Tube hold for add-on    Gold Top - SST    Collection Time: 08/21/20  4:51 PM   Result Value Ref Range    Extra Tube Hold for add-ons.    CBC Auto Differential    Collection Time: 08/21/20  4:51 PM   Result Value Ref Range    WBC 10.95 (H) 3.40 - 10.80 10*3/mm3    RBC 4.30 3.77 - 5.28 10*6/mm3    Hemoglobin 13.6 12.0 - 15.9 g/dL    Hematocrit 40.7 34.0 - 46.6 %    MCV 94.7 79.0 - 97.0 fL    MCH 31.6 26.6 - 33.0 pg    MCHC 33.4 31.5 - 35.7 g/dL    RDW 12.1 (L) 12.3 - 15.4 %    RDW-SD 42.2 37.0 - 54.0 fl    MPV 10.1 6.0 - 12.0 fL    Platelets 148 140 - 450 10*3/mm3    Neutrophil % 87.8 (H) 42.7 - 76.0 %    Lymphocyte % 4.8 (L) 19.6 - 45.3 %    Monocyte % 6.0 5.0 - 12.0 %    Eosinophil % 0.1 (L) 0.3 - 6.2 %    Basophil % 0.2 0.0 - 1.5 %    Immature Grans % 1.1 (H) 0.0 - 0.5 %    Neutrophils, Absolute 9.61 (H) 1.70 - 7.00 10*3/mm3    Lymphocytes, Absolute 0.53 (L) 0.70 - 3.10 10*3/mm3    Monocytes,  Absolute 0.66 0.10 - 0.90 10*3/mm3    Eosinophils, Absolute 0.01 0.00 - 0.40 10*3/mm3    Basophils, Absolute 0.02 0.00 - 0.20 10*3/mm3    Immature Grans, Absolute 0.12 (H) 0.00 - 0.05 10*3/mm3    nRBC 0.0 0.0 - 0.2 /100 WBC   Digoxin Level    Collection Time: 08/21/20  4:51 PM   Result Value Ref Range    Digoxin 0.60 0.60 - 1.20 ng/mL   Magnesium    Collection Time: 08/21/20  4:51 PM   Result Value Ref Range    Magnesium 1.8 1.6 - 2.4 mg/dL   Troponin    Collection Time: 08/21/20  4:51 PM   Result Value Ref Range    Troponin T <0.010 0.000 - 0.030 ng/mL   Comprehensive Metabolic Panel    Collection Time: 08/21/20  4:51 PM   Result Value Ref Range    Glucose 145 (H) 65 - 99 mg/dL    BUN 18 8 - 23 mg/dL    Creatinine 1.05 (H) 0.57 - 1.00 mg/dL    Sodium 137 136 - 145 mmol/L    Potassium 3.8 3.5 - 5.2 mmol/L    Chloride 104 98 - 107 mmol/L    CO2 22.9 22.0 - 29.0 mmol/L    Calcium 8.3 8.2 - 9.6 mg/dL    Total Protein 6.1 6.0 - 8.5 g/dL    Albumin 3.50 3.50 - 5.20 g/dL    ALT (SGPT) 15 1 - 33 U/L    AST (SGOT) 43 (H) 1 - 32 U/L    Alkaline Phosphatase 60 39 - 117 U/L    Total Bilirubin 1.3 (H) 0.0 - 1.2 mg/dL    eGFR Non African Amer 49 (L) >60 mL/min/1.73    Globulin 2.6 gm/dL    A/G Ratio 1.3 g/dL    BUN/Creatinine Ratio 17.1 7.0 - 25.0    Anion Gap 10.1 5.0 - 15.0 mmol/L   CK    Collection Time: 08/21/20  4:51 PM   Result Value Ref Range    Creatine Kinase 2,704 (H) 20 - 180 U/L   POC Glucose Once    Collection Time: 08/21/20  5:31 PM   Result Value Ref Range    Glucose 96 70 - 130 mg/dL   Urinalysis With Microscopic If Indicated (No Culture) - Urine, Catheter    Collection Time: 08/21/20  6:51 PM   Result Value Ref Range    Color, UA Yellow Yellow, Straw    Appearance, UA Clear Clear    pH, UA 6.0 5.0 - 8.0    Specific Gravity, UA 1.011 1.005 - 1.030    Glucose, UA Negative Negative    Ketones, UA Negative Negative    Bilirubin, UA Negative Negative    Blood, UA Large (3+) (A) Negative    Protein, UA 30 mg/dL (1+)  (A) Negative    Leuk Esterase, UA Negative Negative    Nitrite, UA Negative Negative    Urobilinogen, UA 1.0 E.U./dL 0.2 - 1.0 E.U./dL   Urinalysis, Microscopic Only - Urine, Catheter    Collection Time: 20  6:51 PM   Result Value Ref Range    RBC, UA 3-5 (A) None Seen, 0-2 /HPF    WBC, UA 0-2 None Seen, 0-2 /HPF    Bacteria, UA None Seen None Seen /HPF    Squamous Epithelial Cells, UA None Seen None Seen, 0-2 /HPF    Hyaline Casts, UA 0-2 None Seen /LPF    Methodology Manual Light Microscopy        Ordered the above labs and reviewed the results.        RADIOLOGY  Ct Head Without Contrast    Result Date: 2020  CT SCAN OF THE BRAIN WITHOUT CONTRAST  HISTORY: Fell. Trauma to head. Headache.  TECHNIQUE: The CT scan was performed as an emergency procedure through the brain without contrast.  FINDINGS: There is moderate diffuse atrophy and chronic small vessel ischemic change similar to the study of 2018. There is no evidence of acute intracranial hemorrhage or mass effect. There is a small amount of mucosal thickening and fluid in the right portion of the sphenoid sinus that has actually improved from 2018.  CT SCAN OF THE CERVICAL SPINE  HISTORY: Fell. Neck pain.  FINDINGS: The CT scan was performed as an emergency procedure through the cervical spine and demonstrates the followin. There is no evidence of fracture involving the odontoid. However, there are nondisplaced fractures involving the left lateral articular facets of C6 and C7. There is no associated displacement. 2. There are scattered degenerative changes in the cervical spine with spurring of the lateral facets and uncovertebral joints. This causes some slight bony foraminal encroachment at C5-6 on the left and at C6-7 on the left.      Radiation dose reduction techniques were utilized, including automated exposure control and exposure modulation based on body size.  This report was finalized on 2020 7:19 PM by Dr. Lisandro Mendez  M.D.      Ct Cervical Spine Without Contrast    Result Date: 2020  CT SCAN OF THE BRAIN WITHOUT CONTRAST  HISTORY: Fell. Trauma to head. Headache.  TECHNIQUE: The CT scan was performed as an emergency procedure through the brain without contrast.  FINDINGS: There is moderate diffuse atrophy and chronic small vessel ischemic change similar to the study of 2018. There is no evidence of acute intracranial hemorrhage or mass effect. There is a small amount of mucosal thickening and fluid in the right portion of the sphenoid sinus that has actually improved from 2018.  CT SCAN OF THE CERVICAL SPINE  HISTORY: Fell. Neck pain.  FINDINGS: The CT scan was performed as an emergency procedure through the cervical spine and demonstrates the followin. There is no evidence of fracture involving the odontoid. However, there are nondisplaced fractures involving the left lateral articular facets of C6 and C7. There is no associated displacement. 2. There are scattered degenerative changes in the cervical spine with spurring of the lateral facets and uncovertebral joints. This causes some slight bony foraminal encroachment at C5-6 on the left and at C6-7 on the left.      Radiation dose reduction techniques were utilized, including automated exposure control and exposure modulation based on body size.  This report was finalized on 2020 7:19 PM by Dr. Lisandro Mendez M.D.      Xr Chest 1 View    Result Date: 2020  XR CHEST 1 VW-  2020  HISTORY: Weakness. Dizziness.  Heart size is mildly enlarged. There is mild interstitial prominence of the lungs which appears slightly more severe than on 2013 and 2018 and could be an element of mild interstitial edema or worsening of some chronic interstitial fibrosis.  Partially calcified breast implants are seen. Cardiac pacemaker seen in good position.  No focal infiltrates are seen.      Mild interstitial prominence appears slightly more severe than on  previous studies. This could indicate some worsening of interstitial fibrosis versus some mild superimposed interstitial edema. Please correlate. 2. Mild cardiomegaly.  This report was finalized on 8/21/2020 5:47 PM by Dr. Ander Cast M.D.        Ordered the above noted radiological studies. Reviewed by me in PACS.            PROCEDURES  Procedures              MEDICATIONS GIVEN IN ER  Medications   sodium chloride 0.9 % flush 10 mL (has no administration in time range)   sodium chloride 0.9 % flush 10 mL (10 mL Intravenous Given 8/21/20 2138)   sodium chloride 0.9 % flush 10 mL (has no administration in time range)   nitroglycerin (NITROSTAT) SL tablet 0.4 mg (has no administration in time range)   sodium chloride 0.9 % infusion (75 mL/hr Intravenous New Bag 8/21/20 2138)   acetaminophen (TYLENOL) tablet 650 mg (has no administration in time range)     Or   acetaminophen (TYLENOL) 160 MG/5ML solution 650 mg (has no administration in time range)     Or   acetaminophen (TYLENOL) suppository 650 mg (has no administration in time range)   HYDROcodone-acetaminophen (NORCO) 5-325 MG per tablet 1 tablet (has no administration in time range)   HYDROmorphone (DILAUDID) injection 0.5 mg (has no administration in time range)     And   naloxone (NARCAN) injection 0.4 mg (has no administration in time range)   melatonin tablet 5 mg (has no administration in time range)   bisacodyl (DULCOLAX) EC tablet 5 mg (has no administration in time range)   docusate sodium (COLACE) capsule 100 mg (100 mg Oral Not Given 8/21/20 2138)   ondansetron (ZOFRAN) tablet 4 mg (has no administration in time range)     Or   ondansetron (ZOFRAN) injection 4 mg (has no administration in time range)   calcium carbonate (TUMS) chewable tablet 500 mg (200 mg elemental) (has no administration in time range)   sodium chloride 0.9 % bolus 500 mL (0 mL Intravenous Stopped 8/21/20 1830)                   MEDICAL DECISION MAKING, PROGRESS, and  CONSULTS    All labs have been independently reviewed by me.  All radiology studies have been reviewed by me and discussed with radiologist dictating the report.   EKG's independently viewed and interpreted by me.  Discussion below represents my analysis of pertinent findings related to patient's condition, differential diagnosis, treatment plan and final disposition.    ED Course as of Aug 21 2147   Fri Aug 21, 2020   1654 I interpreted the chest x-ray which shows mild cardiomegaly which is stable compared to previous 4/1/2018.  There is a single lead pacemaker in place.  No infiltrate or pleural effusion.    [JR]   1711 89-year-old female with history of atrial fibrillation on chronic anticoagulation presents after a fall with head injury and neck pain.  I am concerned for possible intracranial hemorrhage and have ordered CT head for further evaluation.  Also concern for possible cervical spine pathology.  She presented in a cervical collar.  CT cervical spine has been ordered.  I have also ordered laboratory evaluation including PT/INR and CK level as she is at risk for rhabdomyolysis given prolonged time down the floor.    [JR]   1728 EKG          EKG time: 1712  Rhythm/Rate: Atrial fibrillation, rate 93  P waves and NH: N/A  QRS, axis: LAFB  ST and T waves: No acute ischemic changes    Interpreted Contemporaneously by me, independently viewed            [JR]   1812 Creatine Kinase(!): 2,704 [JR]   1812 INR(!): 3.30 [JR]   1812 Digoxin: 0.60 [JR]   1812 Magnesium: 1.8 [JR]   1812 Troponin T: <0.010 [JR]   1812 Creatinine(!): 1.05 [JR]   1812 Glucose(!): 145 [JR]   1812 Sodium: 137 [JR]   1812 Potassium: 3.8 [JR]   1812 WBC(!): 10.95 [JR]   1812 Hemoglobin: 13.6 [JR]   1827 I personally interpreted the CT brain and CT cervical spine images.  I see no evidence of acute intracranial hemorrhage.  There appears to be a left lateral mass fracture involving the C5 vertebrae.    [JR]   1847 I discussed the cervical  spine fracture with Dr. Cannon, neurosurgery spine, who reviewed the images.  He recommended a [place] J collar for 6 weeks.  He will consult while in the hospital.  He did not feel that she warranted CTA of the neck given that the fracture does not involve the vertebral foramen.    [JR]   1853 I discussed the CT cervical spine findings with Dr. Mendez, radiologist.    [JR]   1906 I discussed with Dr. Leonardo, [facility], who agrees to admit.  He requested an observation status with a bed on [address].    [JR]      ED Course User Index  [JR] Az Pfeiffer MD              I wore a surgical mask, gloves, and eye protection during this patient encounter.  Patient also wearing a surgical mask.  Hand hygeine performed before and after seeing the patient.    DIAGNOSIS  Final diagnoses:   Traumatic rhabdomyolysis, initial encounter (CMS/[facility])   Minor head injury, initial encounter   Chronic anticoagulation   Closed fracture of cervical vertebra, unspecified cervical vertebral level, initial encounter (CMS/[facility])         DISPOSITION  Admit            Latest Documented Vital Signs:  As of 21:47  BP- 114/60 HR- 76 Temp- 97.5 °F (36.4 °C) (Oral) O2 sat- 93%        --    Please note that portions of this were completed with a voice recognition program.          Az Pfeiffer MD  08/21/20 8670

## 2020-08-22 ENCOUNTER — APPOINTMENT (OUTPATIENT)
Dept: CT IMAGING | Facility: HOSPITAL | Age: 85
End: 2020-08-22

## 2020-08-22 PROBLEM — R79.1 SUPRATHERAPEUTIC INR: Status: ACTIVE | Noted: 2020-08-22

## 2020-08-22 LAB
ANION GAP SERPL CALCULATED.3IONS-SCNC: 9.3 MMOL/L (ref 5–15)
BASOPHILS # BLD AUTO: 0.02 10*3/MM3 (ref 0–0.2)
BASOPHILS NFR BLD AUTO: 0.3 % (ref 0–1.5)
BUN SERPL-MCNC: 16 MG/DL (ref 8–23)
BUN/CREAT SERPL: 21.1 (ref 7–25)
CALCIUM SPEC-SCNC: 8.1 MG/DL (ref 8.2–9.6)
CHLORIDE SERPL-SCNC: 109 MMOL/L (ref 98–107)
CK SERPL-CCNC: 2767 U/L (ref 20–180)
CO2 SERPL-SCNC: 18.7 MMOL/L (ref 22–29)
CREAT SERPL-MCNC: 0.76 MG/DL (ref 0.57–1)
DEPRECATED RDW RBC AUTO: 43.5 FL (ref 37–54)
EOSINOPHIL # BLD AUTO: 0.51 10*3/MM3 (ref 0–0.4)
EOSINOPHIL NFR BLD AUTO: 7.1 % (ref 0.3–6.2)
ERYTHROCYTE [DISTWIDTH] IN BLOOD BY AUTOMATED COUNT: 12.4 % (ref 12.3–15.4)
GFR SERPL CREATININE-BSD FRML MDRD: 71 ML/MIN/1.73
GLUCOSE SERPL-MCNC: 90 MG/DL (ref 65–99)
HCT VFR BLD AUTO: 39.9 % (ref 34–46.6)
HGB BLD-MCNC: 13.2 G/DL (ref 12–15.9)
IMM GRANULOCYTES # BLD AUTO: 0.03 10*3/MM3 (ref 0–0.05)
IMM GRANULOCYTES NFR BLD AUTO: 0.4 % (ref 0–0.5)
INR PPP: 3.45 (ref 0.9–1.1)
LYMPHOCYTES # BLD AUTO: 1.24 10*3/MM3 (ref 0.7–3.1)
LYMPHOCYTES NFR BLD AUTO: 17.2 % (ref 19.6–45.3)
MCH RBC QN AUTO: 31.3 PG (ref 26.6–33)
MCHC RBC AUTO-ENTMCNC: 33.1 G/DL (ref 31.5–35.7)
MCV RBC AUTO: 94.5 FL (ref 79–97)
MONOCYTES # BLD AUTO: 0.65 10*3/MM3 (ref 0.1–0.9)
MONOCYTES NFR BLD AUTO: 9 % (ref 5–12)
NEUTROPHILS NFR BLD AUTO: 4.78 10*3/MM3 (ref 1.7–7)
NEUTROPHILS NFR BLD AUTO: 66 % (ref 42.7–76)
NRBC BLD AUTO-RTO: 0 /100 WBC (ref 0–0.2)
PLATELET # BLD AUTO: 143 10*3/MM3 (ref 140–450)
PMV BLD AUTO: 10.6 FL (ref 6–12)
POTASSIUM SERPL-SCNC: 3.4 MMOL/L (ref 3.5–5.2)
PROTHROMBIN TIME: 33.5 SECONDS (ref 11.7–14.2)
RBC # BLD AUTO: 4.22 10*6/MM3 (ref 3.77–5.28)
SODIUM SERPL-SCNC: 137 MMOL/L (ref 136–145)
WBC # BLD AUTO: 7.23 10*3/MM3 (ref 3.4–10.8)

## 2020-08-22 PROCEDURE — 70450 CT HEAD/BRAIN W/O DYE: CPT

## 2020-08-22 PROCEDURE — 85025 COMPLETE CBC W/AUTO DIFF WBC: CPT | Performed by: INTERNAL MEDICINE

## 2020-08-22 PROCEDURE — 99221 1ST HOSP IP/OBS SF/LOW 40: CPT | Performed by: NURSE PRACTITIONER

## 2020-08-22 PROCEDURE — 82550 ASSAY OF CK (CPK): CPT | Performed by: INTERNAL MEDICINE

## 2020-08-22 PROCEDURE — 85610 PROTHROMBIN TIME: CPT | Performed by: NURSE PRACTITIONER

## 2020-08-22 PROCEDURE — 36415 COLL VENOUS BLD VENIPUNCTURE: CPT | Performed by: INTERNAL MEDICINE

## 2020-08-22 PROCEDURE — 80048 BASIC METABOLIC PNL TOTAL CA: CPT | Performed by: INTERNAL MEDICINE

## 2020-08-22 RX ORDER — METOPROLOL SUCCINATE 50 MG/1
50 TABLET, EXTENDED RELEASE ORAL DAILY
Status: DISCONTINUED | OUTPATIENT
Start: 2020-08-22 | End: 2020-08-25 | Stop reason: HOSPADM

## 2020-08-22 RX ORDER — ATORVASTATIN CALCIUM 20 MG/1
20 TABLET, FILM COATED ORAL DAILY
Status: DISCONTINUED | OUTPATIENT
Start: 2020-08-22 | End: 2020-08-25 | Stop reason: HOSPADM

## 2020-08-22 RX ORDER — DIGOXIN 250 MCG
250 TABLET ORAL
Status: DISCONTINUED | OUTPATIENT
Start: 2020-08-22 | End: 2020-08-25 | Stop reason: HOSPADM

## 2020-08-22 RX ADMIN — SODIUM CHLORIDE, PRESERVATIVE FREE 10 ML: 5 INJECTION INTRAVENOUS at 20:46

## 2020-08-22 RX ADMIN — DOCUSATE SODIUM 100 MG: 100 CAPSULE ORAL at 08:31

## 2020-08-22 RX ADMIN — METOPROLOL SUCCINATE 50 MG: 50 TABLET, EXTENDED RELEASE ORAL at 08:31

## 2020-08-22 RX ADMIN — DIGOXIN 250 MCG: 250 TABLET ORAL at 12:19

## 2020-08-22 RX ADMIN — ATORVASTATIN CALCIUM 20 MG: 20 TABLET, FILM COATED ORAL at 08:31

## 2020-08-22 RX ADMIN — HYDROCODONE BITARTRATE AND ACETAMINOPHEN 1 TABLET: 5; 325 TABLET ORAL at 23:28

## 2020-08-22 NOTE — ED NOTES
I wore full protective equipment throughout this patient encounter including a face mask, eye shield and gloves. Hand hygiene/washing of hands was performed before donning protective equipment and after removal when leaving the room.       Es Grayson RN  08/21/20 2003

## 2020-08-22 NOTE — PROGRESS NOTES
Name: Brandy Han ADMIT: 2020   : 1930  PCP: Rupal Sutton MD    MRN: [id number] LOS: 0 days   AGE/SEX: 89 y.o. female  ROOM: [facility]     Subjective   Subjective   Patient seen and examined chart reviewed discussed with patient and daughter who was at bedside.  No new problems noted this morning    Review of Systems   She denies chest pain shortness of air cough fever chills sputum production.  She denies numbness tingling upper lower extremities.  No bladder or bowel complaints  Objective   Objective   Vital Signs  Temp:  [97.4 °F (36.3 °C)-98.7 °F (37.1 °C)] 98.2 °F (36.8 °C)  Heart Rate:  [] 84  Resp:  [18-20] 18  BP: (102-127)/(57-96) 127/83  SpO2:  [90 %-98 %] 91 %  on   ;   Device (Oxygen Therapy): room air  Body mass index is 29.34 kg/m².  Physical Exam  General: Alert pleasant elderly female lying flat in bed with collar in place  HEENT exam: Normocephalic sclerae clear oropharynx is clear  Neck: Collar in place  Lungs: Clear to auscultation with good air movement  Cardiovascular: Regular rate and rhythm normal S1 and S2  Abdomen: Soft nontender nondistended with positive bowel sounds  Extremities: Moves all extremities well, extremities warm and well-perfused, without rash or edema  Neurologic is grossly nonfocal  Psychiatric: Awake alert pleasant  Results Review:       I reviewed the patient's new clinical results.  Results from last 7 days   Lab Units 20  0626 20  1651   WBC 10*3/mm3 7.23 10.95*   HEMOGLOBIN g/dL 13.2 13.6   PLATELETS 10*3/mm3 143 148     Results from last 7 days   Lab Units 20  0626 20  1651   SODIUM mmol/L 137 137   POTASSIUM mmol/L 3.4* 3.8   CHLORIDE mmol/L 109* 104   CO2 mmol/L 18.7* 22.9   BUN mg/dL 16 18   CREATININE mg/dL 0.76 1.05*   GLUCOSE mg/dL 90 145*   Estimated Creatinine Clearance: 52.4 mL/min (by C-G formula based on SCr of 0.76 mg/dL).  Results from last 7 days   Lab Units 20  1651   ALBUMIN g/dL 3.50   BILIRUBIN  mg/dL 1.3*   ALK PHOS U/L 60   AST (SGOT) U/L 43*   ALT (SGPT) U/L 15     Results from last 7 days   Lab Units 08/22/20  0626 08/21/20  1651   CALCIUM mg/dL 8.1* 8.3   ALBUMIN g/dL  --  3.50   MAGNESIUM mg/dL  --  1.8     No results found for: COVID19  Glucose   Date/Time Value Ref Range Status   08/21/2020 1731 96 70 - 130 mg/dL Final       CT Head Without Contrast  CT SCAN OF THE BRAIN WITHOUT CONTRAST     HISTORY: Fell with head trauma. On anticoagulation. Headache.     The CT scan was performed through the brain without contrast. There is  moderate diffuse atrophy and chronic small vessel ischemic change  similar to yesterday's exam. There is no evidence of acute intracranial  hemorrhage or mass effect. There is a small scalp hematoma in the right  parietal region but no evidence of underlying fracture. There is a small  amount of mucosal thickening and fluid in the right portion of the  sphenoid sinus unchanged.     Radiation dose reduction techniques were utilized, including automated  exposure control and exposure modulation based on body size.     This report was finalized on 8/22/2020 8:25 AM by Dr. Lisandro Mendez M.D.           atorvastatin 20 mg Oral Daily   digoxin 250 mcg Oral Daily   docusate sodium 100 mg Oral BID   metoprolol succinate XL 50 mg Oral Daily   sodium chloride 10 mL Intravenous Q12H       Pharmacy to dose warfarin     sodium chloride 100 mL/hr Last Rate: 100 mL/hr (08/22/20 0907)   Diet Regular       Assessment/Plan     Active Hospital Problems    Diagnosis  POA   • **Closed fracture of sixth cervical vertebra (CMS/HCC) [S12.500A]  Yes   • Supratherapeutic INR [R79.1]  Yes   • Non-traumatic rhabdomyolysis [M62.82]  Yes   • Presence of cardiac pacemaker [Z95.0]  Yes   • Paroxysmal atrial fibrillation (CMS/HCC) [I48.0]  Yes   • Osteoporosis [M81.0]  Yes   • History of venous thrombosis [Z86.718]  Not Applicable   • Bilateral carotid artery stenosis [I65.23]  Yes   • Hyperlipidemia [E78.5]   Yes      Resolved Hospital Problems   No resolved problems to display.       89 y.o. female admitted with Closed fracture of sixth cervical vertebra (CMS/[facility]).    · Status post fall  · Closed fracture involving C6-C7: Neurosurgery consulted recommends continuing to observe in cervical collar.  · Mild rhabdo: Likely as a result of the fall.  CK is 2767, will continue intravenous fluids and recheck her CPK daily resolved.  · Intermittent atrial fib: Rate is well controlled on metoprolol and digoxin.  Patient is chronically on Coumadin and had supratherapeutic INR Coumadin is held pharmacy consulted.   · History of deep venous thrombosis: Patient's INR is greater than 3.4, will use SCUDs for DVT prophylaxis hold Coumadin at the present time till it is definitively clear that patient would not require surgery.  · Hypertension: Blood pressures are currently well controlled  · Hyperlipidemia: Continue patient's statin therapy with Lipitor.  · SCDs for DVT prophylaxis.  · Full code.  · Discussed with patient and family.  · Anticipate discharge home next week.      Darius Talavera MD  [facility]  08/22/20  13:59    Patient was wearing facemask when I entered the room and throughout our encounter.  I wore protective equipment throughout this patient encounter including a face mask, gloves and protective eyewear.  Hand hygiene was performed before donning protective equipment and after removal when leaving the room.

## 2020-08-22 NOTE — PROGRESS NOTES
Pharmacy Consult: Warfarin Dosing/ Monitoring    Brandy Han is a 89 y.o. female, estimated creatinine clearance is 39.9 mL/min (A) (by C-G formula based on SCr of 1.05 mg/dL (H)). weighing 85 kg (187 lb 4.8 oz).     has a past medical history of Anxiety, Arterial thrombosis (CMS/HCC), Arthritis, Atrial fibrillation (CMS/HCC), Headache, Heart palpitations, Migraine, Syncope, and UTI (urinary tract infection).    Social History     Tobacco Use    Smoking status: Never Smoker    Smokeless tobacco: Never Used   Substance Use Topics    Alcohol use: Yes     Alcohol/week: 1.0 standard drinks     Types: 1 Shots of liquor per week    Drug use: No       Results from last 7 days   Lab Units 08/21/20  1651   INR  3.30*   HEMOGLOBIN g/dL 13.6   HEMATOCRIT % 40.7   PLATELETS 10*3/mm3 148     Results from last 7 days   Lab Units 08/21/20  1651   SODIUM mmol/L 137   POTASSIUM mmol/L 3.8   CHLORIDE mmol/L 104   CO2 mmol/L 22.9   BUN mg/dL 18   CREATININE mg/dL 1.05*   CALCIUM mg/dL 8.3   BILIRUBIN mg/dL 1.3*   ALK PHOS U/L 60   ALT (SGPT) U/L 15   AST (SGOT) U/L 43*   GLUCOSE mg/dL 145*     Anticoagulation history: Chronic warfarin therapy for A.fib (Warfarin 4 mg nightly)    Hospital Anticoagulation:  Consulting provider: SHERITA Herndon  Start date: 8/22/20  Indication: A.fib  Target INR: 2-3  Expected duration: Indefinite   Bridge Therapy:                 Date 8/22            INR 3.3            Warfarin dose HOLD              Potential drug interactions: N/A    Relevant nutrition status: Regular diet    Other:     Education complete?/ Date: Not yet completed    Assessment/Plan:  INR supratherapeutic, HOLD warfarin.  Monitor daily PT/INR  Follow up 8/22    Pharmacy will continue to follow until discharge or discontinuation of warfarin.   Arnaldo Joseph III [facility]  8/22/2020

## 2020-08-22 NOTE — PROGRESS NOTES
Pharmacy Consult: Warfarin Dosing/ Monitoring    Brandy Han is a 89 y.o. female, estimated creatinine clearance is 52.4 mL/min (by C-G formula based on SCr of 0.76 mg/dL). weighing 85 kg (187 lb 4.8 oz).     has a past medical history of Anxiety, Arterial thrombosis (CMS/HCC), Arthritis, Atrial fibrillation (CMS/HCC), Headache, Heart palpitations, Migraine, Syncope, and UTI (urinary tract infection).    Social History     Tobacco Use    Smoking status: Never Smoker    Smokeless tobacco: Never Used   Substance Use Topics    Alcohol use: Yes     Alcohol/week: 1.0 standard drinks     Types: 1 Shots of liquor per week    Drug use: No       Results from last 7 days   Lab Units 08/22/20  0626 08/21/20  1651   INR  3.45* 3.30*   HEMOGLOBIN g/dL 13.2 13.6   HEMATOCRIT % 39.9 40.7   PLATELETS 10*3/mm3 143 148     Results from last 7 days   Lab Units 08/22/20  0626 08/21/20  1651   SODIUM mmol/L 137 137   POTASSIUM mmol/L 3.4* 3.8   CHLORIDE mmol/L 109* 104   CO2 mmol/L 18.7* 22.9   BUN mg/dL 16 18   CREATININE mg/dL 0.76 1.05*   CALCIUM mg/dL 8.1* 8.3   BILIRUBIN mg/dL  --  1.3*   ALK PHOS U/L  --  60   ALT (SGPT) U/L  --  15   AST (SGOT) U/L  --  43*   GLUCOSE mg/dL 90 145*     Anticoagulation history: Chronic warfarin therapy for A.fib prior to admission. Alternating 4mg Qday with 5mg Qday    Hospital Anticoagulation:  Consulting provider: SHERITA Herndon  Start date: 8/22/20  Indication: A.fib  Target INR: 2-3  Expected duration: Indefinite   Bridge Therapy: no                Date 8/21 8/22           INR 3.3 3.45           Warfarin dose HOLD HOLD             Potential drug interactions:   Acetaminophen- may result in an increased risk of bleeding.   Melatonin- may result in an increased risk of bleeding.     Relevant nutrition status: Regular diet    Other:     Education complete?/ Date:     Assessment/Plan:  INR remains supratherapeutic, continue to hold warfarin.  Monitor daily PT/INR  Follow up 8/23    Pharmacy  will continue to follow until discharge or discontinuation of warfarin.   Desiree Diggs, [facility]  8/22/2020

## 2020-08-22 NOTE — PLAN OF CARE
Problem: Patient Care Overview  Goal: Plan of Care Review  Outcome: Ongoing (interventions implemented as appropriate)  Flowsheets (Taken 8/22/2020 1527)  Progress: improving  Plan of Care Reviewed With: patient  Outcome Summary: VSS, am labs, bedrest, minimal pain, in C collar, turn Q2, IVF, neuro sx to see

## 2020-08-22 NOTE — CONSULTS
Inpatient Neurosurgery Consult  Consult performed by: Krystal Terrell APRN  Consult ordered by: Santiago Leonardo MD  Reason for consult: Cervical spine fracture          Patient Care Team:  Rupal Sutton MD as PCP - General  LouiseRadha MD as PCP - Claims Attributed    Chief complaint: neck pain    Subjective     History of Present Illness     This is a very pleasant 89-year-old female with a history of atrial fibrillation for which she takes Coumadin at home, sick sinus syndrome with pacemaker, bilateral carotid artery stenosis, DVT, osteoporosis, syncope, migraines.  The patient experienced a mechanical fall when trying to get up from the commode yesterday.  She subsequently struck her head on the linen closet and fell.  She did not lose consciousness but was too incapacitated to get herself up off the floor.  She therefore laid there for about 3 hours.  She complained of left sided neck pain upon arrival to the emergency room. CT images were performed of both the head and neck.  Please see below for the full description of those imaging results.  She is also supratherapeutic on the Coumadin with an INR of 3.45.  CT images of the brain showed no evidence of acute hemorrhage.  CT scan of the cervical spine revealed evidence of cervical spine fracture and therefore neurosurgery has been asked to evaluate the patient.    Review of Systems   Constitutional: Negative for appetite change, chills and diaphoresis.   Eyes: Negative for photophobia, pain, redness and visual disturbance.   Respiratory: Negative for choking and chest tightness.    Cardiovascular: Negative for chest pain and palpitations.   Gastrointestinal: Negative for abdominal distention, abdominal pain, constipation, diarrhea, nausea and vomiting.   Musculoskeletal: Positive for back pain.   Neurological: Negative for numbness and headaches.            Past Medical History:   Diagnosis Date   • Anxiety    • Arterial thrombosis (CMS/[facility])    •  Arthritis    • Atrial fibrillation (CMS/HCC)    • Headache    • Heart palpitations    • Migraine    • Syncope    • UTI (urinary tract infection)    ,   Past Surgical History:   Procedure Laterality Date   • ANKLE SURGERY     • CARDIAC PACEMAKER PLACEMENT     • OTHER SURGICAL HISTORY Left     elbow surgery   • PACEMAKER IMPLANTATION     ,   Family History   Problem Relation Age of Onset   • Heart disease Mother    • Lung cancer Father    • Stroke Daughter         cerebral accident   ,   Social History     Tobacco Use   • Smoking status: Never Smoker   • Smokeless tobacco: Never Used   Substance Use Topics   • Alcohol use: Yes     Alcohol/week: 1.0 standard drinks     Types: 1 Shots of liquor per week   • Drug use: No   ,   Facility-Administered Medications Prior to Admission   Medication Dose Route Frequency Provider Last Rate Last Dose   • cyanocobalamin injection 1,000 mcg  1,000 mcg Intramuscular Q28 Days Rupal Sutton MD   1,000 mcg at 09/27/19 1524     Medications Prior to Admission   Medication Sig Dispense Refill Last Dose   • atorvastatin (LIPITOR) 20 MG tablet TAKE 1 TABLET BY MOUTH DAILY 90 tablet 0    • digoxin (LANOXIN) 250 MCG tablet Take 250 mcg by mouth Daily.      • furosemide (LASIX) 40 MG tablet Take 40 mg by mouth Daily As Needed.   Patient Taking Differently   • metoprolol succinate XL (TOPROL-XL) 50 MG 24 hr tablet Take 50 mg by mouth Daily.      • spironolactone (ALDACTONE) 25 MG tablet Take 25 mg by mouth Daily.      • warfarin (COUMADIN) 4 MG tablet Take 4 mg by mouth Every Other Day. Alternate days with 5mg tab      • warfarin (COUMADIN) 5 MG tablet Take 5 mg by mouth Every Other Day. Alternate days with 4mg tabs   Unknown   , Scheduled Meds:    atorvastatin 20 mg Oral Daily   digoxin 250 mcg Oral Daily   docusate sodium 100 mg Oral BID   metoprolol succinate XL 50 mg Oral Daily   sodium chloride 10 mL Intravenous Q12H   , Continuous Infusions:    Pharmacy to dose warfarin     sodium  chloride 100 mL/hr Last Rate: 100 mL/hr (08/22/20 0557)   , PRN Meds:  •  acetaminophen **OR** acetaminophen **OR** acetaminophen  •  bisacodyl  •  calcium carbonate  •  HYDROcodone-acetaminophen  •  HYDROmorphone **AND** naloxone  •  melatonin  •  nitroglycerin  •  ondansetron **OR** ondansetron  •  Pharmacy to dose warfarin  •  sodium chloride  •  sodium chloride and Allergies:  Patient has no known allergies.    Objective      Vital Signs  Temp:  [97.4 °F (36.3 °C)-98.7 °F (37.1 °C)] 98.2 °F (36.8 °C)  Heart Rate:  [] 84  Resp:  [18-20] 18  BP: (102-127)/(57-96) 127/83    Physical Exam   Constitutional: She is oriented to person, place, and time. She appears well-developed and well-nourished. She is cooperative. No distress.   HENT:   Head: Normocephalic and atraumatic.   Right Ear: External ear normal.   Left Ear: External ear normal.   Mouth/Throat: Oropharynx is clear and moist.   Eyes: Pupils are equal, round, and reactive to light. Conjunctivae and EOM are normal. Right eye exhibits no discharge. Left eye exhibits no discharge.   Neck: Normal range of motion. Neck supple. No JVD present. No tracheal deviation present.   Cardiovascular: Normal rate, normal heart sounds and intact distal pulses.   Pulmonary/Chest: Effort normal. No respiratory distress.   Abdominal: Soft. She exhibits no distension. There is no tenderness.   Musculoskeletal: Normal range of motion. She exhibits tenderness (Tender to palpation in the left side of the neck and into the left trapezius region.). She exhibits no edema.   Lymphadenopathy:     She has no cervical adenopathy.   Neurological: She is alert and oriented to person, place, and time. She has normal reflexes. She displays normal reflexes. No sensory deficit. She exhibits normal muscle tone. Coordination normal. GCS eye subscore is 4. GCS verbal subscore is 5. GCS motor subscore is 6.   No motor or sensory deficits. DTR's normal. Negative Roach's; negative clonus. SLR  and Adama's negative bilaterally.  Able to bear weight on heels and toes bilaterally.     Skin: Skin is warm and dry. She is not diaphoretic. No erythema.   Psychiatric: She has a normal mood and affect. Thought content normal.   Vitals reviewed.      Results Review:    I reviewed the patient's new clinical results.     I have independently reviewed the CT images of the brain that were performed without contrast August 21, 2020 at 6:17 PM and August 22, 2020 at 7:42 AM.  Both CT scans showed no evidence of acute intracranial pathology.  No evidence of intracranial hemorrhage or mass-effect.  The patient denies any seizures or syncopal episodes.  She denies taking her anti-inflammatory medication    I have also independently reviewed CT images of the cervical spine performed without contrast dated August 21, 2020 at 6:17 PM.  The cervical CT revealed a nondisplaced fracture of the left lateral facets at C6 and C7.  There is no evidence of displacement.  There are also degenerative changes noted in the cervical spine with slight foraminal narrowing at C5-6 and C6-7 on the left.  The patient is currently wearing a hard cervical collar.  Neurosurgery has been asked given opinion regarding the above studies.  The patient is not able to have a MRI due to the fact that she has a smoker. TLSO brace. was applied last evening.  Therapy has also been consulted for assistance with mobilization.  .  Results from last 7 days   Lab Units 08/22/20  0626 08/21/20  1651   WBC 10*3/mm3 7.23 10.95*   HEMOGLOBIN g/dL 13.2 13.6   HEMATOCRIT % 39.9 40.7   PLATELETS 10*3/mm3 143 148     Results from last 7 days   Lab Units 08/22/20  0626 08/21/20  1651   SODIUM mmol/L 137 137   POTASSIUM mmol/L 3.4* 3.8   CHLORIDE mmol/L 109* 104   CO2 mmol/L 18.7* 22.9   BUN mg/dL 16 18   CREATININE mg/dL 0.76 1.05*   GLUCOSE mg/dL 90 145*   CALCIUM mg/dL 8.1* 8.3     .  Results from last 7 days   Lab Units 08/22/20  0626 08/21/20  1651   INR  3.45*  3.30*           Assessment/Plan       Closed fracture of sixth cervical vertebra (CMS/HCC)    History of venous thrombosis    Bilateral carotid artery stenosis    Osteoporosis    Hyperlipidemia    Paroxysmal atrial fibrillation (CMS/HCC)    Presence of cardiac pacemaker    Non-traumatic rhabdomyolysis    Supratherapeutic INR      Assessment & Plan     Closed fracture of left lateral articular facets at C6-7.  Left cervical pain    Continue to observe in cervical collar    I discussed the patients findings and my recommendations with patient    Krystal Terrell, APRN  08/22/20  08:51

## 2020-08-22 NOTE — H&P
Patient Name:  Brandy Han  YOB: 1930  MRN:  [id number]  Admit Date:  8/21/2020  Patient Care Team:  Rupal Sutton MD as PCP - General  Louise, Radha Harrell MD as PCP - Claims Attributed      Chief Complaint   Patient presents with   • Weakness - Generalized   • Fall       Subjective     Ms. Han is a 89 y.o. female with a history of pacemaker, PAF on warfarin, HLD that presents to [facility] complaining of neck pain after fall. Patient reports that she lost her balance when attempting to stand from commode, fell to right side, hit head on linen closet beside toilet and got stuck between the two. She was unable to get up for about 3 hours, daughter found her and called EMS. Patient reports constant pain to neck, worse with movement, denies further injuries from the fall and denies loss of consciousness. Patient denies fever, chest pain, shortness of breath, abdominal pain, changes in bowel or bladder habits, edema. She does report feeling weak, though states she normally does have difficulty standing after sitting for an extended period of time. Workup done tonight shows elevated CK, WBC and INR. CXR indicates some worsening of interstitial fibrosis versus superimposed interstitial edema and cardiomegaly. CT c-spine shows nondisplaced fractures of c6/c7 along with degenerative changes. ED physician spoke with [facility] who requested patient be placed in c-collar and agreed to see in the morning.      History of Present Illness    Past Medical History:   Diagnosis Date   • Anxiety    • Arterial thrombosis (CMS/HCC)    • Arthritis    • Atrial fibrillation (CMS/HCC)    • Headache    • Heart palpitations    • Migraine    • Syncope    • UTI (urinary tract infection)      Past Surgical History:   Procedure Laterality Date   • ANKLE SURGERY     • CARDIAC PACEMAKER PLACEMENT     • OTHER SURGICAL HISTORY Left     elbow surgery   • PACEMAKER IMPLANTATION       Family History   Problem Relation  Age of Onset   • Heart disease Mother    • Lung cancer Father    • Stroke Daughter         cerebral accident     Social History     Tobacco Use   • Smoking status: Never Smoker   • Smokeless tobacco: Never Used   Substance Use Topics   • Alcohol use: Yes     Alcohol/week: 1.0 standard drinks     Types: 1 Shots of liquor per week   • Drug use: No     Facility-Administered Medications Prior to Admission   Medication Dose Route Frequency Provider Last Rate Last Dose   • cyanocobalamin injection 1,000 mcg  1,000 mcg Intramuscular Q28 Days Rupal Sutton MD   1,000 mcg at 09/27/19 1524     Medications Prior to Admission   Medication Sig Dispense Refill Last Dose   • atorvastatin (LIPITOR) 20 MG tablet TAKE 1 TABLET BY MOUTH DAILY 90 tablet 0    • digoxin (LANOXIN) 250 MCG tablet Take 250 mcg by mouth Daily.      • furosemide (LASIX) 40 MG tablet Take 40 mg by mouth Daily As Needed.   Patient Taking Differently   • metoprolol succinate XL (TOPROL-XL) 50 MG 24 hr tablet Take 50 mg by mouth Daily.      • spironolactone (ALDACTONE) 25 MG tablet Take 25 mg by mouth Daily.      • warfarin (COUMADIN) 4 MG tablet Take 4 mg by mouth Every Other Day. Alternate days with 5mg tab      • warfarin (COUMADIN) 5 MG tablet Take 5 mg by mouth Every Other Day. Alternate days with 4mg tabs   Unknown     Allergies:  No Known Allergies    Review of Systems   Constitutional: Positive for chills. Negative for fever.   HENT: Negative.  Negative for congestion and sore throat.    Eyes: Negative.  Negative for visual disturbance.   Respiratory: Negative.  Negative for cough and shortness of breath.    Cardiovascular: Negative.  Negative for chest pain.   Gastrointestinal: Negative.  Negative for abdominal pain, nausea and vomiting.   Endocrine: Negative.    Genitourinary: Negative.  Negative for dysuria, frequency and urgency.   Musculoskeletal: Positive for neck pain.   Skin: Negative.  Negative for color change and pallor.    Allergic/Immunologic: Negative.    Neurological: Positive for weakness. Negative for dizziness and light-headedness.   Hematological: Negative.    Psychiatric/Behavioral: Negative.  Negative for agitation, behavioral problems and confusion.        Objective    Vital Signs  Temp:  [97.4 °F (36.3 °C)-98.7 °F (37.1 °C)] 97.4 °F (36.3 °C)  Heart Rate:  [] 85  Resp:  [18-20] 20  BP: (102-114)/(57-96) 107/79  SpO2:  [90 %-98 %] 90 %  on   ;   Device (Oxygen Therapy): room air  Body mass index is 29.34 kg/m².    Physical Exam   Constitutional: She is oriented to person, place, and time. She appears well-developed and well-nourished. No distress.   HENT:   Head: Normocephalic and atraumatic.   Eyes: EOM are normal.   Neck: Normal range of motion. Neck supple.   Cardiovascular: Normal rate, regular rhythm and intact distal pulses.   No murmur heard.  Pulmonary/Chest: Effort normal. No respiratory distress. She has decreased breath sounds in the right lower field and the left lower field.   Abdominal: Soft. Bowel sounds are normal. She exhibits no distension.   Musculoskeletal: She exhibits no edema.   Neurological: She is alert and oriented to person, place, and time.   Skin: Skin is warm and dry. She is not diaphoretic.   Psychiatric: She has a normal mood and affect. Her behavior is normal. Judgment and thought content normal.   Nursing note and vitals reviewed.      Results Review:   I reviewed the patient's new clinical results including all labs and xrays.    Lab Results (last 24 hours)     Procedure Component Value Units Date/Time    CBC & Differential [[id number]] Collected:  08/21/20 1651    Specimen:  Blood Updated:  08/21/20 1720    Narrative:       The following orders were created for panel order CBC & Differential.  Procedure                               Abnormality         Status                     ---------                               -----------         ------                     CBC Auto  Differential[[id number]]        Abnormal            Final result                 Please view results for these tests on the individual orders.    Protime-INR [[id number]]  (Abnormal) Collected:  08/21/20 1651    Specimen:  Blood Updated:  08/21/20 1729     Protime 32.4 Seconds      INR 3.30    CBC Auto Differential [[id number]]  (Abnormal) Collected:  08/21/20 1651    Specimen:  Blood Updated:  08/21/20 1720     WBC 10.95 10*3/mm3      RBC 4.30 10*6/mm3      Hemoglobin 13.6 g/dL      Hematocrit 40.7 %      MCV 94.7 fL      MCH 31.6 pg      MCHC 33.4 g/dL      RDW 12.1 %      RDW-SD 42.2 fl      MPV 10.1 fL      Platelets 148 10*3/mm3      Neutrophil % 87.8 %      Lymphocyte % 4.8 %      Monocyte % 6.0 %      Eosinophil % 0.1 %      Basophil % 0.2 %      Immature Grans % 1.1 %      Neutrophils, Absolute 9.61 10*3/mm3      Lymphocytes, Absolute 0.53 10*3/mm3      Monocytes, Absolute 0.66 10*3/mm3      Eosinophils, Absolute 0.01 10*3/mm3      Basophils, Absolute 0.02 10*3/mm3      Immature Grans, Absolute 0.12 10*3/mm3      nRBC 0.0 /100 WBC     Digoxin Level [[id number]]  (Normal) Collected:  08/21/20 1651    Specimen:  Blood Updated:  08/21/20 1757     Digoxin 0.60 ng/mL     Magnesium [[id number]]  (Normal) Collected:  08/21/20 1651    Specimen:  Blood Updated:  08/21/20 1757     Magnesium 1.8 mg/dL     Troponin [[id number]]  (Normal) Collected:  08/21/20 1651    Specimen:  Blood Updated:  08/21/20 1757     Troponin T <0.010 ng/mL     Narrative:       Troponin T Reference Range:  <= 0.03 ng/mL-   Negative for AMI  >0.03 ng/mL-     Abnormal for myocardial necrosis.  Clinicians would have to utilize clinical acumen, EKG, Troponin and serial changes to determine if it is an Acute Myocardial Infarction or myocardial injury due to an underlying chronic condition.       Results may be falsely decreased if patient taking Biotin.      Comprehensive Metabolic Panel [[id number]]  (Abnormal) Collected:  08/21/20 1651    Specimen:   Blood Updated:  08/21/20 1757     Glucose 145 mg/dL      BUN 18 mg/dL      Creatinine 1.05 mg/dL      Sodium 137 mmol/L      Potassium 3.8 mmol/L      Chloride 104 mmol/L      CO2 22.9 mmol/L      Calcium 8.3 mg/dL      Total Protein 6.1 g/dL      Albumin 3.50 g/dL      ALT (SGPT) 15 U/L      AST (SGOT) 43 U/L      Alkaline Phosphatase 60 U/L      Total Bilirubin 1.3 mg/dL      eGFR Non African Amer 49 mL/min/1.73      Globulin 2.6 gm/dL      A/G Ratio 1.3 g/dL      BUN/Creatinine Ratio 17.1     Anion Gap 10.1 mmol/L     Narrative:       GFR Normal >60  Chronic Kidney Disease <60  Kidney Failure <15      CK [[id number]]  (Abnormal) Collected:  08/21/20 1651    Specimen:  Blood Updated:  08/21/20 1809     Creatine Kinase 2,704 U/L     POC Glucose Once [[id number]]  (Normal) Collected:  08/21/20 1731    Specimen:  Blood Updated:  08/21/20 1733     Glucose 96 mg/dL     Urinalysis With Microscopic If Indicated (No Culture) - Urine, Catheter [[id number]]  (Abnormal) Collected:  08/21/20 1851    Specimen:  Urine, Catheter Updated:  08/21/20 1915     Color, UA Yellow     Appearance, UA Clear     pH, UA 6.0     Specific Gravity, UA 1.011     Glucose, UA Negative     Ketones, UA Negative     Bilirubin, UA Negative     Blood, UA Large (3+)     Protein, UA 30 mg/dL (1+)     Leuk Esterase, UA Negative     Nitrite, UA Negative     Urobilinogen, UA 1.0 E.U./dL    Urinalysis, Microscopic Only - Urine, Catheter [[id number]]  (Abnormal) Collected:  08/21/20 1851    Specimen:  Urine, Catheter Updated:  08/21/20 1915     RBC, UA 3-5 /HPF      WBC, UA 0-2 /HPF      Bacteria, UA None Seen /HPF      Squamous Epithelial Cells, UA None Seen /HPF      Hyaline Casts, UA 0-2 /LPF      Methodology Manual Light Microscopy    COVID PRE-OP / PRE-PROCEDURE SCREENING ORDER (NO ISOLATION) - Swab, Nasopharynx [[id number]] Collected:  08/21/20 1933    Specimen:  Swab from Nasopharynx Updated:  08/21/20 1949    Narrative:       The following orders were  created for panel order COVID PRE-OP / PRE-PROCEDURE SCREENING ORDER (NO ISOLATION) - Swab, Nasopharynx.  Procedure                               Abnormality         Status                     ---------                               -----------         ------                     COVID-19,BIOTAP, NP/OP S...[[id number]]                      In process                   Please view results for these tests on the individual orders.    COVID-19,BIOTAP, NP/OP SWAB IN TRANSPORT MEDIA OR SALINE 24-36 HR TAT - Swab, Nasopharynx [[id number]] Collected:  08/21/20 1933    Specimen:  Swab from Nasopharynx Updated:  08/21/20 1949          CT Head Without Contrast   Final Result      CT Cervical Spine Without Contrast   Final Result      XR Chest 1 View   Final Result   Mild interstitial prominence appears slightly more severe   than on previous studies. This could indicate some worsening of   interstitial fibrosis versus some mild superimposed interstitial edema.   Please correlate.   2. Mild cardiomegaly.       This report was finalized on 8/21/2020 5:47 PM by Dr. Ander Cast M.D.            Assessment/Plan      Active Hospital Problems    Diagnosis  POA   • **Closed fracture of sixth cervical vertebra (CMS/HCC) [S12.500A]  Yes   • Supratherapeutic INR [R79.1]  Yes   • Non-traumatic rhabdomyolysis [M62.82]  Yes   • Presence of cardiac pacemaker [Z95.0]  Yes   • Paroxysmal atrial fibrillation (CMS/HCC) [I48.0]  Yes   • Osteoporosis [M81.0]  Yes   • History of venous thrombosis [Z86.718]  Not Applicable   • Bilateral carotid artery stenosis [I65.23]  Yes   • Hyperlipidemia [E78.5]  Yes      Resolved Hospital Problems   No resolved problems to display.     Closed fracture c6/c7  -result of mechanical fall tonight  -c-collar in place per [facility] request  -[facility] consult  -pain medication PRN    Non-traumatic rhabdo  -CK 2700 tonight after being on the floor s/p fall for several hours  -IVF overnight  -recheck labs in  AM    PAF/HLD/pacemaker  -on warfarin for PAF, supratherapeutic INR tonight, will hold tonight's dose  -daily INR  -may continue other home medications    VTE Ppx  -SCDs    CODE status  -full    I discussed the patients findings and my recommendations with patient and family.    SHERITA Mcmullen  [facility]  08/21/20  8:34 PM

## 2020-08-23 PROBLEM — S12.601A: Status: ACTIVE | Noted: 2020-08-23

## 2020-08-23 PROBLEM — S12.501A: Status: ACTIVE | Noted: 2020-08-21

## 2020-08-23 LAB
BASOPHILS # BLD AUTO: 0.03 10*3/MM3 (ref 0–0.2)
BASOPHILS NFR BLD AUTO: 0.5 % (ref 0–1.5)
CK SERPL-CCNC: 1053 U/L (ref 20–180)
DEPRECATED RDW RBC AUTO: 42.6 FL (ref 37–54)
EOSINOPHIL # BLD AUTO: 0.68 10*3/MM3 (ref 0–0.4)
EOSINOPHIL NFR BLD AUTO: 10.5 % (ref 0.3–6.2)
ERYTHROCYTE [DISTWIDTH] IN BLOOD BY AUTOMATED COUNT: 12.3 % (ref 12.3–15.4)
HCT VFR BLD AUTO: 36.9 % (ref 34–46.6)
HGB BLD-MCNC: 12.5 G/DL (ref 12–15.9)
IMM GRANULOCYTES # BLD AUTO: 0.03 10*3/MM3 (ref 0–0.05)
IMM GRANULOCYTES NFR BLD AUTO: 0.5 % (ref 0–0.5)
INR PPP: 3.06 (ref 0.9–1.1)
LYMPHOCYTES # BLD AUTO: 1.97 10*3/MM3 (ref 0.7–3.1)
LYMPHOCYTES NFR BLD AUTO: 30.3 % (ref 19.6–45.3)
MCH RBC QN AUTO: 31.9 PG (ref 26.6–33)
MCHC RBC AUTO-ENTMCNC: 33.9 G/DL (ref 31.5–35.7)
MCV RBC AUTO: 94.1 FL (ref 79–97)
MONOCYTES # BLD AUTO: 0.65 10*3/MM3 (ref 0.1–0.9)
MONOCYTES NFR BLD AUTO: 10 % (ref 5–12)
NEUTROPHILS NFR BLD AUTO: 3.14 10*3/MM3 (ref 1.7–7)
NEUTROPHILS NFR BLD AUTO: 48.2 % (ref 42.7–76)
NRBC BLD AUTO-RTO: 0 /100 WBC (ref 0–0.2)
PLATELET # BLD AUTO: 144 10*3/MM3 (ref 140–450)
PMV BLD AUTO: 10.3 FL (ref 6–12)
PROTHROMBIN TIME: 30.6 SECONDS (ref 11.7–14.2)
RBC # BLD AUTO: 3.92 10*6/MM3 (ref 3.77–5.28)
REF LAB TEST METHOD: NORMAL
SARS-COV-2 RNA RESP QL NAA+PROBE: NOT DETECTED
WBC # BLD AUTO: 6.5 10*3/MM3 (ref 3.4–10.8)

## 2020-08-23 PROCEDURE — 82550 ASSAY OF CK (CPK): CPT | Performed by: INTERNAL MEDICINE

## 2020-08-23 PROCEDURE — 85610 PROTHROMBIN TIME: CPT | Performed by: NURSE PRACTITIONER

## 2020-08-23 PROCEDURE — 97530 THERAPEUTIC ACTIVITIES: CPT

## 2020-08-23 PROCEDURE — 80048 BASIC METABOLIC PNL TOTAL CA: CPT | Performed by: INTERNAL MEDICINE

## 2020-08-23 PROCEDURE — 85025 COMPLETE CBC W/AUTO DIFF WBC: CPT | Performed by: INTERNAL MEDICINE

## 2020-08-23 PROCEDURE — 97161 PT EVAL LOW COMPLEX 20 MIN: CPT

## 2020-08-23 PROCEDURE — 99232 SBSQ HOSP IP/OBS MODERATE 35: CPT | Performed by: NURSE PRACTITIONER

## 2020-08-23 RX ORDER — WARFARIN SODIUM 2 MG/1
2 TABLET ORAL
Status: COMPLETED | OUTPATIENT
Start: 2020-08-23 | End: 2020-08-23

## 2020-08-23 RX ORDER — POTASSIUM CHLORIDE 750 MG/1
40 CAPSULE, EXTENDED RELEASE ORAL EVERY 4 HOURS
Status: COMPLETED | OUTPATIENT
Start: 2020-08-23 | End: 2020-08-23

## 2020-08-23 RX ADMIN — SODIUM CHLORIDE, PRESERVATIVE FREE 10 ML: 5 INJECTION INTRAVENOUS at 08:38

## 2020-08-23 RX ADMIN — POTASSIUM CHLORIDE 40 MEQ: 10 CAPSULE, COATED, EXTENDED RELEASE ORAL at 12:29

## 2020-08-23 RX ADMIN — METOPROLOL SUCCINATE 50 MG: 50 TABLET, EXTENDED RELEASE ORAL at 08:38

## 2020-08-23 RX ADMIN — ATORVASTATIN CALCIUM 20 MG: 20 TABLET, FILM COATED ORAL at 08:38

## 2020-08-23 RX ADMIN — POTASSIUM CHLORIDE 40 MEQ: 10 CAPSULE, COATED, EXTENDED RELEASE ORAL at 16:11

## 2020-08-23 RX ADMIN — DIGOXIN 250 MCG: 250 TABLET ORAL at 12:29

## 2020-08-23 RX ADMIN — WARFARIN 2 MG: 2 TABLET ORAL at 16:11

## 2020-08-23 RX ADMIN — SODIUM CHLORIDE 100 ML/HR: 9 INJECTION, SOLUTION INTRAVENOUS at 04:12

## 2020-08-23 RX ADMIN — SODIUM CHLORIDE, PRESERVATIVE FREE 10 ML: 5 INJECTION INTRAVENOUS at 21:24

## 2020-08-23 RX ADMIN — ACETAMINOPHEN 650 MG: 325 TABLET, FILM COATED ORAL at 12:32

## 2020-08-23 NOTE — PROGRESS NOTES
"    DAILY PROGRESS NOTE  [facility]    Patient Identification:  Name: Brandy Han  Age: 89 y.o.  Sex: female  :  1930  MRN: [id number]         Primary Care Physician: Rupal Sutton MD    Subjective:  Interval History: Tolerating hard c-collar to this point.  She obviously has some difficulties when eating.  Denies any nausea vomiting confusion chest pain or troubles breathing.  Pain control is adequate.    Objective: Quite sharp for 89-year-old.  Ambulating very slow but she definitely knows how to use a walker she claims she uses 1 at baseline.  Aide at bedside the no family present.  Nontoxic pleasant and conversational    Scheduled Meds:  atorvastatin 20 mg Oral Daily   digoxin 250 mcg Oral Daily   docusate sodium 100 mg Oral BID   metoprolol succinate XL 50 mg Oral Daily   sodium chloride 10 mL Intravenous Q12H   warfarin 2 mg Oral Once     Continuous Infusions:     Vital signs in last 24 hours:  Temp:  [98.5 °F (36.9 °C)-98.6 °F (37 °C)] 98.6 °F (37 °C)  Heart Rate:  [] 103  Resp:  [16-20] 18  BP: (108-124)/(68-80) 114/77    Intake/Output:    Intake/Output Summary (Last 24 hours) at 2020 1100  Last data filed at 2020 0412  Gross per 24 hour   Intake 1460 ml   Output 354 ml   Net 1106 ml       Exam:  /77 (BP Location: Left arm, Patient Position: Lying)   Pulse 103   Temp 98.6 °F (37 °C) (Oral)   Resp 18   Ht 170.2 cm (67\")   Wt 85 kg (187 lb 4.8 oz)   SpO2 96%   BMI 29.34 kg/m²     General Appearance:    Alert, cooperative, no distress, AAOx3                          Head:    Normocephalic, without obvious abnormality, atraumatic                           Eyes:    PERRL, conjunctivae/corneas clear, EOM's intact, both eyes                         Throat:   Oral mucosa pink and moist                           Neck: Hard c-collar in place                         Lungs:    Clear to auscultation bilaterally, respirations unlabored                 Chest Wall:    " No tenderness or deformity                          Heart:    Regular rate and rhythm, S1 and S2 normal                  Abdomen:     Soft, nontender, bowel sounds active                 Extremities: Moving all with appropriate strength, no cyanosis or edema                        Pulses:   Pulses palpable in all extremities                            Skin:   Skin is warm and dry                  Neurologic:   CNII-XII intact, no focal deficits noted     Data Review:  Labs in chart were reviewed.    Assessment:  Active Hospital Problems    Diagnosis  POA   • **Closed traumatic nondisplaced fracture of sixth cervical vertebra (CMS/HCC) [S12.501A]  Unknown   • Supratherapeutic INR [R79.1]  Yes   • Non-traumatic rhabdomyolysis [M62.82]  Yes   • Presence of cardiac pacemaker [Z95.0]  Yes   • Paroxysmal atrial fibrillation (CMS/HCC) [I48.0]  Yes   • Osteoporosis [M81.0]  Yes   • History of venous thrombosis [Z86.718]  Not Applicable   • Bilateral carotid artery stenosis [I65.23]  Yes   • Hyperlipidemia [E78.5]  Yes      Resolved Hospital Problems   No resolved problems to display.       Plan:    Nondisplaced C5 laminar fracture extending to C6   -Continue hard c-collar per [facility] but no plans for surgical intervention at this time with plans to repeat x-rays in 4 to 6 weeks    Rhabdomyolysis resolved   -Saline lock IVF    PAF with supratherapeutic INR   -AC previously held and trending down to 3.06 and will give 2 mg x 1 today and dose according to INR trends    Re-Place K and recheck in a.m.        Disposition -patient lives alone though has help with daughter.  CCP to help coordinate discharge needs and physical therapy also pending but anticipate could be ready by tomorrow if further logistics are finalized    Janes Jane MD  8/23/2020  11:00

## 2020-08-23 NOTE — THERAPY EVALUATION
Patient Name: Brandy Han  : 1930    MRN: [id number]                              Today's Date: 2020       Admit Date: 2020    Visit Dx:     ICD-10-CM ICD-9-CM   1. Traumatic rhabdomyolysis, initial encounter (CMS/HCC) T79.6XXA 958.6   2. Minor head injury, initial encounter S09.90XA 959.01   3. Chronic anticoagulation Z79.01 V58.61   4. 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[facility])   • B12 deficiency   • Chronic pain of both knees   • Non-traumatic rhabdomyolysis   • Closed traumatic nondisplaced fracture of sixth cervical vertebra (CMS/[facility])   • Supratherapeutic INR   • Closed nondisplaced fracture of left lateral portion of seventh cervical vertebra (CMS/[facility])     Past Medical History:   Diagnosis Date   • Anxiety    • Arterial thrombosis (CMS/HCC)    • Arthritis    • Atrial fibrillation (CMS/HCC)    • Headache    • Heart palpitations    • Migraine    • Syncope    • UTI (urinary tract infection)      Past Surgical History:   Procedure Laterality Date   • ANKLE SURGERY     • CARDIAC PACEMAKER PLACEMENT     • OTHER SURGICAL HISTORY Left     elbow surgery   • PACEMAKER IMPLANTATION       General Information     Row Name 20 1322          PT Evaluation Time/Intention    Document Type  evaluation  -AE     Mode of Treatment  individual therapy;physical therapy  -AE     Row Name 20 1322          General Information    Patient Profile Reviewed?  yes  -AE     Prior Level of Function  independent:  -AE     Existing Precautions/Restrictions  brace worn when out  of bed  -AE     Row Name 08/23/20 1322          Relationship/Environment    Lives With  alone  -AE     Row Name 08/23/20 1322          Resource/Environmental Concerns    Current Living Arrangements  home/apartment/condo  -AE     Row Name 08/23/20 1322          Home Main Entrance    Number of Stairs, Main Entrance  none  -AE     Row Name 08/23/20 1322          Stairs Within Home, Primary    Number of Stairs, Within Home, Primary  none  -AE     Row Name 08/23/20 1322          Cognitive Assessment/Intervention- PT/OT    Orientation Status (Cognition)  oriented x 4  -AE     Row Name 08/23/20 1322          Safety Issues, Functional Mobility    Impairments Affecting Function (Mobility)  pain;strength;endurance/activity tolerance;range of motion (ROM)  -AE       User Key  (r) = Recorded By, (t) = Taken By, (c) = Cosigned By    Initials Name Provider Type    AE Sandy Scales PT Physical Therapist        Mobility     Row Name 08/23/20 1324          Bed Mobility Assessment/Treatment    Bed Mobility Assessment/Treatment  bed mobility (all) activities  -AE     [place] Level (Bed Mobility)  supervision  -AE     Row Name 08/23/20 1324          Transfer Assessment/Treatment    Comment (Transfers)  SBA for all transfers with FWW  -AE     Row Name 08/23/20 1324          Bed-Chair Transfer    Bed-Chair [place] (Transfers)  stand by assist  -AE     Assistive Device (Bed-Chair Transfers)  walker, front-wheeled  -AE     Row Name 08/23/20 1324          Sit-Stand Transfer    Sit-Stand [place] (Transfers)  stand by assist  -AE     Assistive Device (Sit-Stand Transfers)  walker, front-wheeled  -AE     Row Name 08/23/20 1324          Gait/Stairs Assessment/Training    Gait/Stairs Assessment/Training  gait/ambulation independence;gait/ambulation assistive device  -AE     [place] Level (Gait)  stand by assist  -AE     Assistive Device (Gait)  walker, front-wheeled  -AE     Distance in Feet (Gait)  20ft limited due to  pain and timing of medication  -AE     Pattern (Gait)  step-through  -AE     Deviations/Abnormal Patterns (Gait)  adry decreased;stride length decreased  -AE     Bilateral Gait Deviations  forward flexed posture;heel strike decreased  -AE       User Key  (r) = Recorded By, (t) = Taken By, (c) = Cosigned By    Initials Name Provider Type    AE Sandy Scales, SUJATHA Physical Therapist        Obj/Interventions     Row Name 08/23/20 1324          General ROM    GENERAL ROM COMMENTS  BLE WFL  -AE     Row Name 08/23/20 1324          MMT (Manual Muscle Testing)    General MMT Comments  BLE generalized weakness  -AE       User Key  (r) = Recorded By, (t) = Taken By, (c) = Cosigned By    Initials Name Provider Type    AE Sandy Scales, SUJATHA Physical Therapist        Goals/Plan    No documentation.       Clinical Impression     Row Name 08/23/20 1323          Pain Assessment    Additional Documentation  Pain Scale: Numbers Pre/Post-Treatment (Group)  -AE     Row Name 08/23/20 1325          Pain Scale: Numbers Pre/Post-Treatment    Pain Scale: Numbers, Pretreatment  8/10  -AE     Pain Scale: Numbers, Post-Treatment  8/10  -AE     Pain Location - Side  Right  -AE     Pain Location - Orientation  posterior  -AE     Pain Location  neck  -AE     Pain Intervention(s)  Repositioned;Ambulation/increased activity;Rest  -AE     Row Name 08/23/20 1321          Plan of Care Review    Plan of Care Reviewed With  patient  -AE     Row Name 08/23/20 132          Physical Therapy Clinical Impression    Patient/Family Goals Statement (PT Clinical Impression)  Pt wants to DC home and follow up with OPPT  -AE     Criteria for Skilled Interventions Met (PT Clinical Impression)  no;no significant expected improvement in functional status  -AE     Row Name 08/23/20 1328          Positioning and Restraints    Pre-Treatment Position  in bed  -AE     Post Treatment Position  bed  -AE     In Bed  supine;call light within reach;encouraged to call  for assist;exit alarm on;notified nsg  -AE       User Key  (r) = Recorded By, (t) = Taken By, (c) = Cosigned By    Initials Name Provider Type    Sandy Infante PT Physical Therapist        Outcome Measures     Row Name 08/23/20 1325          How much help from another person do you currently need...    Turning from your back to your side while in flat bed without using bedrails?  4  -AE     Moving from lying on back to sitting on the side of a flat bed without bedrails?  3  -AE     Moving to and from a bed to a chair (including a wheelchair)?  4  -AE     Standing up from a chair using your arms (e.g., wheelchair, bedside chair)?  4  -AE     Climbing 3-5 steps with a railing?  3  -AE     To walk in hospital room?  4  -AE     AM-PAC 6 Clicks Score (PT)  22  -AE     Row Name 08/23/20 132          Functional Assessment    Outcome Measure Options  AM-PAC 6 Clicks Basic Mobility (PT)  -AE       User Key  (r) = Recorded By, (t) = Taken By, (c) = Cosigned By    Initials Name Provider Type    Sandy Infante PT Physical Therapist        Physical Therapy Education                 Title: PT OT SLP Therapies (Done)     Topic: Physical Therapy (Done)     Point: Mobility training (Done)     Description:   Instruct learner(s) on safety and technique for assisting patient out of bed, chair or wheelchair.  Instruct in the proper use of assistive devices, such as walker, crutches, cane or brace.              Patient Friendly Description:   It's important to get you on your feet again, but we need to do so in a way that is safe for you. Falling has serious consequences, and your personal safety is the most important thing of all.        When it's time to get out of bed, one of us or a family member will sit next to you on the bed to give you support.     If your doctor or nurse tells you to use a walker, crutches, a cane, or a brace, be sure you use it every time you get out of bed, even if you think you don't need it.     Learning Progress Summary           Patient Acceptance, E,TB, VU,DU by AE at 8/23/2020 1326                   Point: Home exercise program (Done)     Description:   Instruct learner(s) on appropriate technique for monitoring, assisting and/or progressing patient with therapeutic exercises and activities.              Learning Progress Summary           Patient Acceptance, E,TB, VU,DU by AE at 8/23/2020 1326                   Point: Body mechanics (Done)     Description:   Instruct learner(s) on proper positioning and spine alignment for patient and/or caregiver during mobility tasks and/or exercises.              Learning Progress Summary           Patient Acceptance, E,TB, VU,DU by AE at 8/23/2020 1326                   Point: Precautions (Done)     Description:   Instruct learner(s) on prescribed precautions during mobility and gait tasks              Learning Progress Summary           Patient Acceptance, E,TB, VU,DU by AE at 8/23/2020 1326                               User Key     Initials Effective Dates Name Provider Type Discipline    AE 09/04/19 -  Sandy Scales PT Physical Therapist PT              PT Recommendation and Plan     Outcome Summary/Treatment Plan (PT)  Anticipated Discharge Disposition (PT): home, home with assist, home with OP services  Plan of Care Reviewed With: patient     Time Calculation:   PT Charges     Row Name 08/23/20 1330             Time Calculation    Start Time  1230  -AE      Stop Time  1300  -AE      Time Calculation (min)  30 min  -AE      PT Received On  08/23/20  -AE         Time Calculation- PT    Total Timed Code Minutes- PT  20 minute(s)  -AE        User Key  (r) = Recorded By, (t) = Taken By, (c) = Cosigned By    Initials Name Provider Type    AE Sandy Scales PT Physical Therapist        Therapy Charges for Today     Code Description Service Date Service Provider Modifiers Qty    29394803346 HC PT EVAL LOW COMPLEXITY 2 8/23/2020 Sandy Scales PT GP 1     [id number]  PT THERAPEUTIC ACT EA 15 MIN 8/23/2020 Sandy Scales, PT GP 1          PT G-Codes  Outcome Measure Options: AM-PAC 6 Clicks Basic Mobility (PT)  AM-PAC 6 Clicks Score (PT): 22    Sandy Scales, SUJATHA  8/23/2020

## 2020-08-23 NOTE — PLAN OF CARE
Problem: Patient Care Overview  Goal: Plan of Care Review  Outcome: Outcome(s) achieved  Flowsheets (Taken 8/23/2020 1325)  Plan of Care Reviewed With: patient  Note:   Pt is a 89 yo F admitted for management of Closed traumatic, non-displaced fractures L side of C6 and C7. Went through brace wear schedule to include anytime sitting or OOB, allowed to have brace off while laying down (less than 45 degree angle) if comfortable. Pt lives alone in [facility] with 1 small step to enter. Pt uses FWW for household distances, reports PLOF as independent. Pt has family that lives nearby that can provide intermittent assist if needed upon DC. Pt is supervision/SBA for all mobility including bed mobility, transfers, and gait within room ~20ft with FWW. Distance limited only due to neck pain and timing of medication administration. No acute PT needs at this time. DC recs include home with intermittent assist, OPPT with daughter to provide transportation, and will need BSC delivered to home.    .Patient was intermittently wearing a face mask during this therapy encounter. Therapist used appropriate personal protective equipment including eye protection, mask, and gloves.  Mask used was standard procedure mask. Appropriate PPE was worn during the entire therapy session. Hand hygiene was completed before and after therapy session. Patient is not in enhanced droplet precautions.

## 2020-08-23 NOTE — PROGRESS NOTES
LOS: 1 day   Patient Care Team:  Rupal Sutton MD as PCP - General  LouiseRadha MD as PCP - Claims Attributed    Chief Complaint:  Neck pain    Subjective     Wearing hard collar. Denies arm pain. Denies upper or lower extremity weakness.     Interval History: Continues to have L sided neck pain.     History taken from: patient chart RN    Objective      Vital Signs  Temp:  [98.5 °F (36.9 °C)-98.6 °F (37 °C)] 98.6 °F (37 °C)  Heart Rate:  [] 103  Resp:  [16-20] 18  BP: (108-124)/(68-80) 114/77     AA&O x 3.  Cervical collar intact but seems a bit too long.    Moving all four extremities on command  Tender with palpation L lower cervical region.   Sensation equal and intact.   No hyperreflexia. No long tract signs.     Results Review:     I reviewed the patient's new clinical results.     Results from last 7 days   Lab Units 08/23/20  0603 08/22/20  0626 08/21/20  1651   WBC 10*3/mm3 6.50 7.23 10.95*   HEMOGLOBIN g/dL 12.5 13.2 13.6   HEMATOCRIT % 36.9 39.9 40.7   PLATELETS 10*3/mm3 144 143 148     .  Results from last 7 days   Lab Units 08/23/20  0603 08/22/20  0626 08/21/20  1651   INR  3.06* 3.45* 3.30*       Assessment/Plan       Closed traumatic nondisplaced fracture of sixth cervical vertebra (CMS/HCC)    History of venous thrombosis    Bilateral carotid artery stenosis    Osteoporosis    Hyperlipidemia    Paroxysmal atrial fibrillation (CMS/HCC)    Presence of cardiac pacemaker    Non-traumatic rhabdomyolysis    Supratherapeutic INR    Closed nondisplaced fracture of left lateral portion of seventh cervical vertebra (CMS/HCC)          Fall in bathroom     Cervical pain     Closed traumatic, non-displaced fractures L side of C6 and C7      Cervical collar too long; will ask nurse to contact brace shop for better fitting collar.   Begin to mobilize with PT  Consult PT, OT - will need rehab          SHERITA Wolfe  08/23/20  11:09

## 2020-08-23 NOTE — DISCHARGE PLACEMENT REQUEST
"Brandy Rodriguez (89 y.o. Female)     Date of Birth Social Security Number Address Home Phone MRN    06/01/1930  [address] [facility] [address] [phone] [id number]    M[employer] Marital Status          Religious        Admission Date Admission Type Admitting Provider Attending Provider Department, Room/Bed    8/21/20 Emergency Santiago Leonardo MD Masden, Troy Andrew, MD [address], [address]    Discharge Date Discharge Disposition Discharge Destination                       Attending Provider:  Janes Jane MD    Allergies:  No Known Allergies    Isolation:  None   Infection:  None   Code Status:  CPR    Ht:  170.2 cm (67\")   Wt:  85 kg (187 lb 4.8 oz)    Admission Cmt:  None   Principal Problem:  Closed traumatic nondisplaced fracture of sixth cervical vertebra (CMS/[facility]) [S12.501A]                 Active Insurance as of 8/21/2020     Primary Coverage     Payor Plan Insurance Group Employer/Plan Group    MEDICARE MEDICARE A & B      Payor Plan Address Payor Plan Phone Number Payor Plan Fax Number Effective Dates    [address] [phone]  5/1/1995 - None Entered    [facility] [id number]       Subscriber Name Subscriber Birth Date Member ID       BRANDY RODRIGUEZ 6/1/1930 [id number]           Secondary Coverage     Payor Plan Insurance Group Employer/Plan Group    [facility] SUPP [id number]     Payor Plan Address Payor Plan Phone Number Payor Plan Fax Number Effective Dates    [address]   12/1/2016 - None Entered    [facility] [id number]       Subscriber Name Subscriber Birth Date Member ID       BRANDY RODRIGUEZ 6/1/1930 [id number]                 Emergency Contacts      (Rel.) Home Phone Work Phone Mobile Phone    Lisa Jacques (Daughter) [phone] -- --            "

## 2020-08-24 DIAGNOSIS — S12.501D: Primary | ICD-10-CM

## 2020-08-24 LAB
ANION GAP SERPL CALCULATED.3IONS-SCNC: 8.9 MMOL/L (ref 5–15)
BUN SERPL-MCNC: 16 MG/DL (ref 8–23)
BUN/CREAT SERPL: 22.9 (ref 7–25)
CALCIUM SPEC-SCNC: 8 MG/DL (ref 8.2–9.6)
CHLORIDE SERPL-SCNC: 108 MMOL/L (ref 98–107)
CO2 SERPL-SCNC: 20.1 MMOL/L (ref 22–29)
CREAT SERPL-MCNC: 0.7 MG/DL (ref 0.57–1)
GFR SERPL CREATININE-BSD FRML MDRD: 79 ML/MIN/1.73
GLUCOSE SERPL-MCNC: 95 MG/DL (ref 65–99)
INR PPP: 3.03 (ref 0.9–1.1)
POTASSIUM SERPL-SCNC: 4.4 MMOL/L (ref 3.5–5.2)
POTASSIUM SERPL-SCNC: 5 MMOL/L (ref 3.5–5.2)
PROTHROMBIN TIME: 30.4 SECONDS (ref 11.7–14.2)
SODIUM SERPL-SCNC: 137 MMOL/L (ref 136–145)

## 2020-08-24 PROCEDURE — 85610 PROTHROMBIN TIME: CPT | Performed by: HOSPITALIST

## 2020-08-24 PROCEDURE — 97166 OT EVAL MOD COMPLEX 45 MIN: CPT

## 2020-08-24 PROCEDURE — 84132 ASSAY OF SERUM POTASSIUM: CPT | Performed by: HOSPITALIST

## 2020-08-24 PROCEDURE — 97535 SELF CARE MNGMENT TRAINING: CPT

## 2020-08-24 PROCEDURE — 99232 SBSQ HOSP IP/OBS MODERATE 35: CPT | Performed by: NURSE PRACTITIONER

## 2020-08-24 RX ORDER — WARFARIN SODIUM 3 MG/1
1.5 TABLET ORAL
Status: DISCONTINUED | OUTPATIENT
Start: 2020-08-24 | End: 2020-08-25

## 2020-08-24 RX ADMIN — WARFARIN 1.5 MG: 3 TABLET ORAL at 17:56

## 2020-08-24 RX ADMIN — DIGOXIN 250 MCG: 250 TABLET ORAL at 13:03

## 2020-08-24 RX ADMIN — ATORVASTATIN CALCIUM 20 MG: 20 TABLET, FILM COATED ORAL at 09:54

## 2020-08-24 RX ADMIN — DOCUSATE SODIUM 100 MG: 100 CAPSULE ORAL at 09:54

## 2020-08-24 RX ADMIN — HYDROCODONE BITARTRATE AND ACETAMINOPHEN 1 TABLET: 5; 325 TABLET ORAL at 00:39

## 2020-08-24 RX ADMIN — SODIUM CHLORIDE, PRESERVATIVE FREE 10 ML: 5 INJECTION INTRAVENOUS at 20:31

## 2020-08-24 RX ADMIN — HYDROCODONE BITARTRATE AND ACETAMINOPHEN 1 TABLET: 5; 325 TABLET ORAL at 23:30

## 2020-08-24 RX ADMIN — SODIUM CHLORIDE, PRESERVATIVE FREE 10 ML: 5 INJECTION INTRAVENOUS at 09:54

## 2020-08-24 RX ADMIN — METOPROLOL SUCCINATE 50 MG: 50 TABLET, EXTENDED RELEASE ORAL at 09:54

## 2020-08-24 NOTE — PLAN OF CARE
Problem: Patient Care Overview  Goal: Plan of Care Review  Outcome: Ongoing (interventions implemented as appropriate)  Flowsheets (Taken 8/24/2020 1322)  Progress: improving  Plan of Care Reviewed With: patient  Note:   Pt is a 89 year old female admitted after fall at home resulting in C6 & C7 Fx. Pt lives alone and was indep PTA. Pt presents to OT Eval w/ pain limitations, decreased strength, balance, and activity tolerance, impacting indep/safety w/ ADLs and transfers. Pt requires min A for bed mobility and min/CGA for STS transfer. Pt able to ambulate short distance to sink and back to EOB (approx. 10ft) w/ min A using RW- limited d/t endurance and pain. Pt requires max/total A for LB dressing- pain and C-collar limit flexibility. Pt may benefit from skilled OT services to address deficits and maximize indep/safety w/ ADLs and transfers. Recommend discharge to SNF as pt lives alone and pt states her daughter cannot immediately provide 24/7 assist if pt goes home from hospital. OT wore all approp PPE including standard mask, glasses, and gloves; pt not wearing mask d/t c-collar.

## 2020-08-24 NOTE — PROGRESS NOTES
"    DAILY PROGRESS NOTE  [facility]    Patient Identification:  Name: Brandy Han  Age: 89 y.o.  Sex: female  :  1930  MRN: [id number]         Primary Care Physician: Rupal Sutton MD    Subjective:  Interval History: no new issues - denies worsening dysphagia - no ha/n/v    Objective:no fm at bs - nontoxic/conversational/pleasant     Scheduled Meds:  atorvastatin 20 mg Oral Daily   digoxin 250 mcg Oral Daily   docusate sodium 100 mg Oral BID   metoprolol succinate XL 50 mg Oral Daily   sodium chloride 10 mL Intravenous Q12H   warfarin 1.5 mg Oral Daily     Continuous Infusions:     Vital signs in last 24 hours:  Temp:  [97.5 °F (36.4 °C)-98.2 °F (36.8 °C)] 97.9 °F (36.6 °C)  Heart Rate:  [74-96] 79  Resp:  [17-18] 18  BP: (115-132)/(61-84) 132/84    Intake/Output:    Intake/Output Summary (Last 24 hours) at 2020 0953  Last data filed at 2020 0710  Gross per 24 hour   Intake --   Output 700 ml   Net -700 ml       Exam:  /84 (BP Location: Left arm, Patient Position: Lying)   Pulse 79   Temp 97.9 °F (36.6 °C) (Oral)   Resp 18   Ht 170.2 cm (67\")   Wt 85 kg (187 lb 4.8 oz)   SpO2 98%   BMI 29.34 kg/m²     General Appearance:    Alert, cooperative, no distress, AAOx3                          Head:    Normocephalic, without obvious abnormality, atraumatic                           Eyes:    PERRL, conjunctivae/corneas clear, EOM's intact, both eyes                         Throat:   Oral mucosa pink and moist                           Neck:   Hard Ccollar                         Lungs:    Clear to auscultation bilaterally, respirations unlabored                          Heart:    Regular rate and rhythm, S1 and S2 normal                  Abdomen:     Soft, nontender, bowel sounds active                 Extremities:   No cyanosis or edema                  Neurologic:   CNII-XII intact, no focal deficits noted     Data Review:  Labs in chart were " reviewed.    Assessment:  Active Hospital Problems    Diagnosis  POA   • **Closed traumatic nondisplaced fracture of sixth cervical vertebra (CMS/HCC) [S12.501A]  Unknown   • Closed nondisplaced fracture of left lateral portion of seventh cervical vertebra (CMS/HCC) [S12.601A]  Unknown   • Supratherapeutic INR [R79.1]  Yes   • Non-traumatic rhabdomyolysis [M62.82]  Yes   • Presence of cardiac pacemaker [Z95.0]  Yes   • Paroxysmal atrial fibrillation (CMS/HCC) [I48.0]  Yes   • Osteoporosis [M81.0]  Yes   • History of venous thrombosis [Z86.718]  Not Applicable   • Bilateral carotid artery stenosis [I65.23]  Yes   • Hyperlipidemia [E78.5]  Yes      Resolved Hospital Problems   No resolved problems to display.       Plan:    Nondisplaced C5 laminar fracture extending to C6              -Continue hard c-collar per [facility] but no plans for surgical intervention at this time with plans to repeat x-rays in 4 to 6 weeks     Rhabdomyolysis resolved              -Saline lock IVF     PAF with supratherapeutic INR              -AC previously held and trending down to 3.06 and gave 2 mg x 1 on 8/23/2020 and INR today is 3.03 and I will give 1.5 mg x 1 today and recheck and dose further pending INR trends     Re-Placed K            Disposition -awaiting CCP for further assistance.  This patient lives independently and I am not so sure returning to home is the safest thing at this point in time.  I personally support subacute rehab especially with only 20 feet noted of ambulation which was significantly limited secondary to pain   -D/w daughter per patient request [phone] otp      Janes Jane MD  8/24/2020  09:53

## 2020-08-24 NOTE — PROGRESS NOTES
Continued Stay Note  [facility]     Patient Name: Brandy Han  MRN: [id number]  Today's Date: 8/24/2020    Admit Date: 8/21/2020    Discharge Plan     Row Name 08/24/20 1025       Plan    Plan  Referral pending to [facility]    Patient/Family in Agreement with Plan  yes    Plan Comments  CCP called [facility], Dr. Jane anticipates dc tomorrow. Partial Packet in ccp office. clair bernard/ccp        Discharge Codes    No documentation.             Andie Simms, RN

## 2020-08-24 NOTE — PROGRESS NOTES
Discharge Planning Assessment  [facility]     Patient Name: Brandy Han  MRN: [id number]  Today's Date: 8/24/2020    Admit Date: 8/21/2020    Discharge Needs Assessment     Row Name 08/24/20 1205       Living Environment    Lives With  alone    Current Living Arrangements  home/apartment/condo    Primary Care Provided by  self    Provides Primary Care For  no one    Family Caregiver if Needed  child(landon), adult    Quality of Family Relationships  helpful;involved;supportive    Able to Return to Prior Arrangements  yes       Resource/Environmental Concerns    Resource/Environmental Concerns  none    Transportation Concerns  car, none       Transition Planning    Patient/Family Anticipates Transition to  inpatient rehabilitation facility    Patient/Family Anticipated Services at Transition  rehabilitation services;skilled nursing    Transportation Anticipated  family or friend will provide;car, drives self       Discharge Needs Assessment    Readmission Within the Last 30 Days  no previous admission in last 30 days    Concerns to be Addressed  discharge planning    Concerns Comments  SNF    Equipment Currently Used at Home  cane, straight;walker, rolling    Anticipated Changes Related to Illness  inability to care for self    Equipment Needed After Discharge  none    Current Discharge Risk  lives alone        Discharge Plan     Row Name 08/24/20 1159       Plan    Plan  Referral to [address] [place] [address] and [address] [place] [address]    Provided Post Acute Provider List?  Yes    Post Acute Provider List  Nursing Home    Provided Post Acute Provider Quality & Resource List?  Yes    Post Acute Provider Quality and Resource List  Nursing Home    Delivered To  Support Person    Support Person  Explained to daughter medicare.gov and Road to recovery    Method of Delivery  Telephone    Patient/Family in Agreement with Plan  yes    Plan Comments  CCP spoke with daughter Lisa Jacques via inbound call, introduced self  and explained CCP role. Verified facesheet and confirmed [facility]. Pt lives at home alone in a single story home with a few steps to enter. Pt was IADL with mobility, had a cane and walker but did not use within the home. Pt also was able to drive short distances prior to admission. Daughter reports pt will need rehab prior to dc, she has spoken with some friends and would like referral to [facility]. CCP explained would make referrals and would call her back. Recieved additional call from daughter and she requests [facility] as 1ST referral choice and [facility] as backup. CCP called Narda/[facility] and left vm for referral, anticipate dc tomorrow. Partial Packet in CCP office. Pineda BERNARD/CCP    Row Name 08/24/20 1022       Plan    Plan  Referral pending to [facility]    Patient/Family in Agreement with Plan  yes    Plan Comments  CCP called [facility], Dr. Jane anticipates dc tomorrow. Partial Packet in ccp office. pineda bernard/ccp        Destination      Service Provider Request Status Selected Services Address Phone Number Fax Number    [facility] Pending - Request Sent N/A [id number], [place] [address] [phone]       Andie Simms RN 8/24/2020 1159    2nd choice               [facility] Pending - Request Sent N/A [address], [place] [id number] [phone]       Andie Simms RN 8/24/2020 1155    1st choice                 Durable Medical Equipment      Coordination has not been started for this encounter.      Dialysis/Infusion      Coordination has not been started for this encounter.      Home Medical Care      Coordination has not been started for this encounter.      Therapy      Coordination has not been started for this encounter.      Community Resources      Coordination has not been started for this encounter.           Demographic Summary     Row Name 08/24/20 1159       General Information    Admission Type  inpatient    Referral Source  admission list    Reason for Consult  discharge planning    Preferred Language  English     Used During This Interaction  no       Contact Information    Permission Granted to Share Info With  family/designee;facility         Functional Status     Row Name 08/24/20 1159       Functional Status    Usual Activity Tolerance  good    Current Activity Tolerance  fair       Functional Status, IADL    Medications  independent    Meal Preparation  independent    Housekeeping  independent    Laundry  independent    Shopping  independent       Mental Status    General Appearance WDL  WDL        Psychosocial    No documentation.       Abuse/Neglect    No documentation.       Legal    No documentation.       Substance Abuse    No documentation.       Patient Forms    No documentation.           Andie Simms RN

## 2020-08-24 NOTE — PLAN OF CARE
Problem: Patient Care Overview  Goal: Plan of Care Review  8/24/2020 0632 by Nela Simons, RN  Outcome: Ongoing (interventions implemented as appropriate)  Flowsheets (Taken 8/24/2020 0632)  Plan of Care Reviewed With: patient  Outcome Summary: vss. c collar in place. pain medication prn x1 with relief. purewick in place overnight. pleasant and cooperative with care. awaiting d/c plans. turn q2hr. ctm  8/24/2020 0632 by Nela Simons, RN  Reactivated  Goal: Individualization and Mutuality  Outcome: Ongoing (interventions implemented as appropriate)  Goal: Discharge Needs Assessment  Outcome: Ongoing (interventions implemented as appropriate)  Goal: Interprofessional Rounds/Family Conf  Outcome: Ongoing (interventions implemented as appropriate)     Problem: Skin Injury Risk (Adult)  Goal: Identify Related Risk Factors and Signs and Symptoms  Outcome: Ongoing (interventions implemented as appropriate)  Goal: Skin Health and Integrity  Outcome: Ongoing (interventions implemented as appropriate)     Problem: Fall Risk (Adult)  Goal: Identify Related Risk Factors and Signs and Symptoms  Outcome: Ongoing (interventions implemented as appropriate)  Goal: Absence of Fall  Outcome: Ongoing (interventions implemented as appropriate)     Problem: Pain, Chronic (Adult)  Goal: Identify Related Risk Factors and Signs and Symptoms  Outcome: Ongoing (interventions implemented as appropriate)  Goal: Acceptable Pain/Comfort Level and Functional Ability  Outcome: Ongoing (interventions implemented as appropriate)     Problem: Pain, Acute (Adult)  Goal: Identify Related Risk Factors and Signs and Symptoms  Outcome: Ongoing (interventions implemented as appropriate)  Goal: Acceptable Pain Control/Comfort Level  Outcome: Ongoing (interventions implemented as appropriate)     Problem: Fracture Orthopaedic (Adult)  Goal: Signs and Symptoms of Listed Potential Problems Will be Absent, Minimized or Managed (Fracture  Orthopaedic)  Outcome: Ongoing (interventions implemented as appropriate)

## 2020-08-24 NOTE — PROGRESS NOTES
Continued Stay Note  [facility]     Patient Name: Brandy Han  MRN: [id number]  Today's Date: 8/24/2020    Admit Date: 8/21/2020    Discharge Plan     Row Name 08/24/20 1441       Plan    Plan  SNF referral pending-    Patient/Family in Agreement with Plan  yes    Plan Comments  Spoke with Narda/[facility], [facility] is on an admission hold and unable to accept. [facility] called daughter Lisa with update. Awaiting call back from [facility]. Packet in ccp office. Pineda RN/CCP    Row Name 08/24/20 9378       Plan    Plan  Referral to 1st [facility] and 2nd choice now [facility]    Provided Post Acute Provider List?  Yes    Post Acute Provider List  Nursing Home    Provided Post Acute Provider Quality & Resource List?  Yes    Post Acute Provider Quality and Resource List  Nursing Home    Delivered To  Support Person    Support Person  Explained to daughter medicare.gov and Road to recovery    Method of Delivery  Telephone    Patient/Family in Agreement with Plan  yes    Plan Comments  CCP spoke with daughter Lisa Jacques via inbound call, introduced self and explained CCP role. Verified facesheet and confirmed [facility]. Pt lives at home alone in a single story home with a few steps to enter. Pt was IADL with mobility, had a cane and walker but did not use within the home. Pt also was able to drive short distances prior to admission. Daughter reports pt will need rehab prior to dc, she has spoken with some friends and would like referral to [facility]. CCP explained would make referrals and would call her back. Recieved additional call from daughter and she requests [facility] as 1ST referral choice and [facility] as backup. CCP called [facility] and left  for referral, anticipate dc tomorrow. Partial Packet in CCP office. Pineda GLASS/CCP        Discharge Codes    No documentation.             Andie Simms, RN

## 2020-08-24 NOTE — PROGRESS NOTES
LOS: 2 days   Patient Care Team:  Rupal Sutton MD as PCP - General  Radha Louise MD as PCP - Claims Attributed    Chief Complaint: Neck pain    Subjective     Resting in bed with hard collar on    Interval History:   Patient states the hard collar helps with neck pain.  She denies any arm pain.  States she has been up with physical therapy.  History taken from: patient chart    Objective      Alert and oriented  Cervical collar on and fits well  No obvious focal motor deficits  Sensation intact  No Roach's  No clonus  Lung effort normal      Vital Signs  Temp:  [97.5 °F (36.4 °C)-98.2 °F (36.8 °C)] 97.5 °F (36.4 °C)  Heart Rate:  [74-79] 76  Resp:  [17-18] 18  BP: (119-137)/(61-85) 137/85       Results Review:     I reviewed the patient's new clinical results.  I reviewed the patient's new imaging results and agree with the interpretation.  Results from last 7 days   Lab Units 08/23/20  0603 08/22/20  0626 08/21/20  1651   WBC 10*3/mm3 6.50 7.23 10.95*   HEMOGLOBIN g/dL 12.5 13.2 13.6   HEMATOCRIT % 36.9 39.9 40.7   PLATELETS 10*3/mm3 144 143 148     .  Results from last 7 days   Lab Units 08/24/20  0629 08/23/20  0603 08/22/20  0626 08/21/20  1651   SODIUM mmol/L  --  132* 137 137   POTASSIUM mmol/L 4.4 3.3* 3.4* 3.8   CHLORIDE mmol/L  --  106 109* 104   CO2 mmol/L  --  20.1* 18.7* 22.9   BUN mg/dL  --  16 16 18   CREATININE mg/dL  --  0.70 0.76 1.05*   CALCIUM mg/dL  --  8.0* 8.1* 8.3   BILIRUBIN mg/dL  --   --   --  1.3*   ALK PHOS U/L  --   --   --  60   ALT (SGPT) U/L  --   --   --  15   AST (SGOT) U/L  --   --   --  43*   GLUCOSE mg/dL  --  95 90 145*         Assessment/Plan       Closed traumatic nondisplaced fracture of sixth cervical vertebra (CMS/HCC)    History of venous thrombosis    Bilateral carotid artery stenosis    Osteoporosis    Hyperlipidemia    Paroxysmal atrial fibrillation (CMS/HCC)    Presence of cardiac pacemaker    Non-traumatic rhabdomyolysis    Supratherapeutic INR     Closed nondisplaced fracture of left lateral portion of seventh cervical vertebra (CMS/HCC)    Now has cervical collar that fits well.  She states it does help with the neck pain.  She denies any arm pain or weakness.  No focal deficits on exam.  From neurosurgical perspective can be discharged to rehab when medically stable.  Will sign off for now.  Office will arrange follow-up in 2 weeks with films.  Recommend that she stays in the collar at all times.  A second set of pads can be obtained so that she can shower and then change the pads afterwards while the second set dries.        Svitlana Salvador, APRN  08/24/20  15:13

## 2020-08-24 NOTE — THERAPY EVALUATION
Acute Care - Occupational Therapy Initial Evaluation  Psychiatric     Patient Name: Brandy Han  : 1930  MRN: [id number]  Today's Date: 2020             Admit Date: 2020       ICD-10-CM ICD-9-CM   1. Traumatic rhabdomyolysis, initial encounter (CMS/HCC) T79.6XXA 958.6   2. Minor head injury, initial encounter S09.90XA 959.01   3. Chronic anticoagulation Z79.01 V58.61   4. 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[facility])   • B12 deficiency   • Chronic pain of both knees   • Non-traumatic rhabdomyolysis   • Closed traumatic nondisplaced fracture of sixth cervical vertebra (CMS/[facility])   • Supratherapeutic INR   • Closed nondisplaced fracture of left lateral portion of seventh cervical vertebra (CMS/[facility])     Past Medical History:   Diagnosis Date   • Anxiety    • Arterial thrombosis (CMS/[facility])    • Arthritis    • Atrial fibrillation (CMS/[facility])    • Headache    • Heart palpitations    • Migraine    • Syncope    • UTI (urinary tract infection)      Past Surgical History:   Procedure Laterality Date   • ANKLE SURGERY     • CARDIAC PACEMAKER PLACEMENT     • OTHER SURGICAL HISTORY Left     elbow surgery   • PACEMAKER IMPLANTATION            OT ASSESSMENT FLOWSHEET (last 12 hours)      Occupational Therapy Evaluation     Row Name 20 1011                   OT Evaluation Time/Intention    Subjective Information  complains of;weakness;pain  -RD        Document Type  evaluation  -RD        Mode of Treatment  occupational therapy  -RD        Patient Effort  good  -RD            General Information    Patient Profile Reviewed?  yes  -RD        Patient Observations  alert;cooperative;agree to therapy  -RD        Patient/Family Observations  pt semi-supine in bed w/ no signs of acute distress  -RD        Prior Level of Function  independent:;ADL's  -RD        Equipment Currently Used at Home  walker, standard  -RD        Pertinent History of Current Functional Problem  pt admitted after fall at home resulting in C6 and C7 Fx  -RD        Existing Precautions/Restrictions  fall;brace worn when out of bed c-collar when OOB or when sitting at EOB  -RD        Risks Reviewed  patient:  -RD        Benefits Reviewed  patient:  -RD           Relationship/Environment    Lives With  alone  -RD           Resource/Environmental Concerns    Current Living Arrangements  home/apartment/condo  -RD           Cognitive Assessment/Intervention- PT/OT    Orientation Status (Cognition)  oriented x 4  -RD        Follows Commands (Cognition)  follows one step commands;verbal cues/prompting required  -RD           Bed Mobility Assessment/Treatment    Bed Mobility Assessment/Treatment  supine-sit;sit-supine  -RD        Supine-Sit [place] (Bed Mobility)  minimum assist (75% patient effort);verbal cues  -RD        Sit-Supine [place] (Bed Mobility)  contact guard;verbal cues  -RD        Assistive Device (Bed Mobility)  bed rails;head of bed elevated  -RD           Functional Mobility    Functional Mobility- Ind. Level  minimum assist (75% patient effort);contact guard assist;verbal cues required  -RD        Functional Mobility- Device  rolling walker  -RD        Functional Mobility-Distance (Feet)  10  -RD        Functional Mobility- Comment  Pt able to ambulate from EOB <> sink using RW w/ min/CGA- weakness and quick fatigue noted  -RD           Transfer Assessment/Treatment    Transfer Assessment/Treatment  sit-stand transfer;stand-sit transfer  -RD           Sit-Stand Transfer    Sit-Stand [place]  (Transfers)  minimum assist (75% patient effort);contact guard;verbal cues  -RD        Assistive Device (Sit-Stand Transfers)  walker, front-wheeled  -RD           Stand-Sit Transfer    Stand-Sit [place] (Transfers)  minimum assist (75% patient effort);contact guard;verbal cues  -RD        Assistive Device (Stand-Sit Transfers)  walker, front-wheeled  -RD           ADL Assessment/Intervention    BADL Assessment/Intervention  lower body dressing  -RD           Lower Body Dressing Assessment/Training    Lower Body Dressing [place] Level  lower body dressing skills;doff;don;socks;maximum assist (25% patient effort);dependent (less than 25% patient effort);verbal cues  -RD        Lower Body Dressing Position  edge of bed sitting  -RD        Comment (Lower Body Dressing)  pain and c-collar limit flexibility   -RD           General ROM    GENERAL ROM COMMENTS  B UE AROM WFL  -RD           MMT (Manual Muscle Testing)    General MMT Comments  B UE MMT: grossly approx. 3+/5  -RD           Motor Assessment/Interventions    Additional Documentation  Balance (Group)  -RD           Balance    Balance  static sitting balance;static standing balance  -RD           Static Sitting Balance    Level of [place] (Unsupported Sitting, Static Balance)  supervision  -RD        Sitting Position (Unsupported Sitting, Static Balance)  sitting on edge of bed  -RD        Time Able to Maintain Position (Unsupported Sitting, Static Balance)  more than 5 minutes  -RD           Static Standing Balance    Level of [place] (Supported Standing, Static Balance)  contact guard assist  -RD        Time Able to Maintain Position (Supported Standing, Static Balance)  1 to 2 minutes  -RD        Assistive Device Utilized (Supported Standing, Static Balance)  walker, rolling  -RD           Sensory Assessment/Intervention    Sensory General Assessment  no sensation deficits identified  -RD           Positioning and Restraints     Pre-Treatment Position  in bed  -RD        Post Treatment Position  bed  -RD        In Bed  fowlers;call light within reach;encouraged to call for assist;exit alarm on;notified nsg  -RD           Pain Assessment    Additional Documentation  Pain Scale: Word Pre/Post-Treatment (Group)  -RD           Pain Scale: Word Pre/Post-Treatment    Pain: Word Scale, Pretreatment  6 - moderate-severe pain  -RD        Pain: Word Scale, Post-Treatment  6 - moderate-severe pain  -RD        Pre/Post Treatment Pain Comment  posterior neck; repositioned, increased activity/rest; tolerated eval/tx   -RD           Coping    Observed Emotional State  accepting;calm;cooperative  -RD        Verbalized Emotional State  acceptance  -RD           Plan of Care Review    Plan of Care Reviewed With  patient  -RD           Clinical Impression (OT)    OT Diagnosis  Pt presents to OT eval w/ pain limitations, impaired strength, balance, and activity tolerance, impacting indep/safety w/ ADLs and transfers   -RD        Criteria for Skilled Therapeutic Interventions Met (OT Eval)  yes;treatment indicated  -RD        Rehab Potential (OT Eval)  good, to achieve stated therapy goals  -RD        Therapy Frequency (OT Eval)  5 times/wk  -RD        Care Plan Review (OT)  evaluation/treatment results reviewed;care plan/treatment goals reviewed;patient/other agree to care plan  -RD        Anticipated Discharge Disposition (OT)  skilled nursing facility  -RD           Planned OT Interventions    Planned Therapy Interventions (OT Eval)  activity tolerance training;BADL retraining;functional balance retraining;transfer/mobility retraining;strengthening exercise;ROM/therapeutic exercise;patient/caregiver education/training  -RD           OT Goals    Transfer Goal Selection (OT)  transfer, OT goal 1  -RD        Dressing Goal Selection (OT)  dressing, OT goal 1  -RD        Balance Goal Selection (OT)  balance, OT goal 1  -RD        Additional Documentation  Balance  Goal Selection (OT) (Row)  -RD           Transfer Goal 1 (OT)    Activity/Assistive Device (Transfer Goal 1, OT)  toilet  -RD        [place] Level/Cues Needed (Transfer Goal 1, OT)  standby assist  -RD        Time Frame (Transfer Goal 1, OT)  long term goal (LTG)  -RD        Progress/Outcome (Transfer Goal 1, OT)  goal ongoing  -RD           Dressing Goal 1 (OT)    Activity/Assistive Device (Dressing Goal 1, OT)  lower body dressing  -RD        [place]/Cues Needed (Dressing Goal 1, OT)  minimum assist (75% or more patient effort)  -RD        Time Frame (Dressing Goal 1, OT)  long term goal (LTG)  -RD        Progress/Outcome (Dressing Goal 1, OT)  goal ongoing  -RD           Balance Goal 1 (OT)    Activity/Assistive Device (Balance Goal 1, OT)  standing, static to assist w/ safety during ADLs and transfers  -RD        [place] Level/Cues Needed (Balance Goal 1, OT)  standby assist;supervision required  -RD        Time Frame (Balance Goal 1, OT)  long term goal (LTG)  -RD        Progress/Outcomes (Balance Goal 1, OT)  goal ongoing  -RD          User Key  (r) = Recorded By, (t) = Taken By, (c) = Cosigned By    Initials Name Effective Dates    Adilia Brock, OT 10/14/19 -          Occupational Therapy Education                 Title: PT OT SLP Therapies (In Progress)     Topic: Occupational Therapy (In Progress)     Point: ADL training (Done)     Description:   Instruct learner(s) on proper safety adaptation and remediation techniques during self care or transfers.   Instruct in proper use of assistive devices.              Learning Progress Summary           Patient Acceptance, E, VU by DAVID at 8/24/2020 6469    Comment:  OT educ on OT role in therapeutic process and pt's POC.                   Point: Home exercise program (Not Started)     Description:   Instruct learner(s) on appropriate technique for monitoring, assisting and/or progressing therapeutic exercises/activities.              Learner  Progress:   Not documented in this visit.          Point: Precautions (Done)     Description:   Instruct learner(s) on prescribed precautions during self-care and functional transfers.              Learning Progress Summary           Patient Acceptance, E, VU by RD at 8/24/2020 1329    Comment:  OT educ on OT role in therapeutic process and pt's POC.                   Point: Body mechanics (Done)     Description:   Instruct learner(s) on proper positioning and spine alignment during self-care, functional mobility activities and/or exercises.              Learning Progress Summary           Patient Acceptance, E, VU by RD at 8/24/2020 1329    Comment:  OT educ on OT role in therapeutic process and pt's POC.                               User Key     Initials Effective Dates Name Provider Type Discipline    RD 10/14/19 -  Adilia Chavez, OT Occupational Therapist OT                  OT Recommendation and Plan  Outcome Summary/Treatment Plan (OT)  Anticipated Discharge Disposition (OT): skilled nursing facility  Planned Therapy Interventions (OT Eval): activity tolerance training, BADL retraining, functional balance retraining, transfer/mobility retraining, strengthening exercise, ROM/therapeutic exercise, patient/caregiver education/training  Therapy Frequency (OT Eval): 5 times/wk  Plan of Care Review  Plan of Care Reviewed With: patient  Plan of Care Reviewed With: patient    Outcome Measures     Row Name 08/24/20 1300             How much help from another is currently needed...    Putting on and taking off regular lower body clothing?  1  -RD      Bathing (including washing, rinsing, and drying)  2  -RD      Toileting (which includes using toilet bed pan or urinal)  2  -RD      Putting on and taking off regular upper body clothing  2  -RD      Taking care of personal grooming (such as brushing teeth)  3  -RD      Eating meals  3  -RD      AM-PAC 6 Clicks Score (OT)  13  -RD         Functional Assessment     Outcome Measure Options  AM-PAC 6 Clicks Daily Activity (OT)  -RD        User Key  (r) = Recorded By, (t) = Taken By, (c) = Cosigned By    Initials Name Provider Type    Adilia Brock OT Occupational Therapist          Time Calculation:   Time Calculation- OT     Row Name 08/24/20 1334             Time Calculation- OT    OT Start Time  0932  -RD      OT Stop Time  1011  -RD      OT Time Calculation (min)  39 min  -RD      Total Timed Code Minutes- OT  33 minute(s)  -RD      OT Received On  08/24/20  -RD      OT - Next Appointment  08/25/20  -RD      OT Goal Re-Cert Due Date  09/07/20  -        User Key  (r) = Recorded By, (t) = Taken By, (c) = Cosigned By    Initials Name Provider Type    Adilia Brock OT Occupational Therapist        Therapy Charges for Today     Code Description Service Date Service Provider Modifiers Qty    98154698055 HC OT SELF CARE/MGMT/TRAIN EA 15 MIN 8/24/2020 Adilia Chavez OT GO 2    97781270965 HC OT EVAL MOD COMPLEXITY 2 8/24/2020 Adilia Chavez OT GO 1               Adilia Chavez OT  8/24/2020

## 2020-08-25 VITALS
BODY MASS INDEX: 30.49 KG/M2 | DIASTOLIC BLOOD PRESSURE: 77 MMHG | WEIGHT: 194.3 LBS | RESPIRATION RATE: 18 BRPM | TEMPERATURE: 97.8 F | HEART RATE: 87 BPM | SYSTOLIC BLOOD PRESSURE: 133 MMHG | HEIGHT: 67 IN | OXYGEN SATURATION: 95 %

## 2020-08-25 LAB
INR PPP: 2.79 (ref 0.9–1.1)
INR PPP: 2.88 (ref 0.9–1.1)
PROTHROMBIN TIME: 28.5 SECONDS (ref 11.7–14.2)
PROTHROMBIN TIME: 29.2 SECONDS (ref 11.7–14.2)

## 2020-08-25 PROCEDURE — 85610 PROTHROMBIN TIME: CPT | Performed by: HOSPITALIST

## 2020-08-25 RX ORDER — HYDROCODONE BITARTRATE AND ACETAMINOPHEN 5; 325 MG/1; MG/1
1 TABLET ORAL EVERY 4 HOURS PRN
Qty: 18 TABLET | Refills: 0 | Status: SHIPPED | OUTPATIENT
Start: 2020-08-25 | End: 2020-08-31

## 2020-08-25 RX ORDER — ACETAMINOPHEN 325 MG/1
650 TABLET ORAL EVERY 4 HOURS PRN
Status: ON HOLD
Start: 2020-08-25 | End: 2022-09-08

## 2020-08-25 RX ORDER — WARFARIN SODIUM 3 MG/1
TABLET ORAL
Start: 2020-08-25 | End: 2020-10-26

## 2020-08-25 RX ORDER — BISACODYL 5 MG/1
5 TABLET, DELAYED RELEASE ORAL DAILY PRN
Status: ON HOLD
Start: 2020-08-25 | End: 2022-09-08

## 2020-08-25 RX ORDER — WARFARIN SODIUM 3 MG/1
3 TABLET ORAL
Status: DISCONTINUED | OUTPATIENT
Start: 2020-08-25 | End: 2020-08-25 | Stop reason: HOSPADM

## 2020-08-25 RX ORDER — PSEUDOEPHEDRINE HCL 30 MG
100 TABLET ORAL 2 TIMES DAILY
Status: ON HOLD
Start: 2020-08-25 | End: 2022-09-08

## 2020-08-25 RX ADMIN — ATORVASTATIN CALCIUM 20 MG: 20 TABLET, FILM COATED ORAL at 08:51

## 2020-08-25 RX ADMIN — METOPROLOL SUCCINATE 50 MG: 50 TABLET, EXTENDED RELEASE ORAL at 08:51

## 2020-08-25 RX ADMIN — SODIUM CHLORIDE, PRESERVATIVE FREE 10 ML: 5 INJECTION INTRAVENOUS at 08:55

## 2020-08-25 RX ADMIN — DIGOXIN 250 MCG: 250 TABLET ORAL at 11:59

## 2020-08-25 NOTE — DISCHARGE SUMMARY
"                                                                   PHYSICIAN DISCHARGE SUMMARY                                                                        [facility]    Patient Identification:  Name: Brandy Han  Age: 89 y.o.  Sex: female  :  1930  MRN: [id number]  Primary Care Physician: Rupal Sutton MD    Admit date: 2020  Discharge date and time: 2020     Discharged Condition: good    Discharge Diagnoses:    Closed traumatic nondisplaced fracture of sixth cervical vertebra: Continue hard collar.    Closed nondisplaced fracture of left lateral portion of seventh cervical vertebra: Please see above.    History of venous thrombosis    Bilateral carotid artery stenosis    Osteoporosis    Hyperlipidemia    Paroxysmal atrial fibrillation): Continue anticoagulation and rate control.    Presence of cardiac pacemaker    Traumatic rhabdomyolysis: Improving.    Supratherapeutic INR: Coumadin dose adjusted.       Hospital Course:  Pleasant 89-year-old female presents after sustaining mechanical fall.  Work-up was remarkable for the following findings on CT scan of the C-spine:  \"nondisplaced fractures involving the left lateral articular facets of C6 and C7. There is no associated displacement.\"  The patient was admitted and neurosurgical consultation was obtained.  They recommended conservative treatment with a hard collar.  She is tolerated this fairly well so far.  We will follow her up in the office in approximately 2 weeks.  She has numerous other medical issues as noted above.  Atrial fibrillation have remained under good control.  Her INR is a bit on the high side and Coumadin dose has been adjusted.  This will need to be rechecked in a couple of days post discharge.  She did have an elevated CK probably secondary traumatic rhabdomyolysis which was coming down very nicely within 24 hours.  Presently she remains afebrile vital signs stable but will need rehab placement " "to avoid further falls.    Consults:     Consults     Date and Time Order Name Status Description    8/21/2020 1930 Inpatient Neurosurgery Consult Completed     8/21/2020 1834 Neurosurgery (on-call MD unless specified) Completed     8/21/2020 1828 [facility] (on-call MD unless specified) Details Completed             Discharge Exam:  Physical Exam   General Appearance:  Comfortable, well-appearing, in no acute distress and not in pain.    Vital signs: (most recent): Blood pressure 141/77, pulse 87, temperature 98.3 °F (36.8 °C), temperature source Oral, resp. rate 18, height 170.2 cm (67\"), weight 88.1 kg (194 lb 4.8 oz), SpO2 95 %.    Lungs:  Normal effort and normal respiratory rate.  Breath sounds clear to auscultation.    Heart: Normal rate.  Regular rhythm.    Extremities: There is dependent edema.  (Nonpitting pretibial edema.)  Neurological: Patient is alert and oriented to person, place and time.    Skin:  Warm and dry.  Hard collar in place.       Disposition:  Rehab    Patient Instructions:      Discharge Medications      New Medications      Instructions Start Date   acetaminophen 325 MG tablet  Commonly known as:  TYLENOL   650 mg, Oral, Every 4 Hours PRN      bisacodyl 5 MG EC tablet  Commonly known as:  DULCOLAX   5 mg, Oral, Daily PRN      docusate sodium 100 MG capsule   100 mg, Oral, 2 Times Daily      HYDROcodone-acetaminophen 5-325 MG per tablet  Commonly known as:  NORCO   1 tablet, Oral, Every 4 Hours PRN         Changes to Medications      Instructions Start Date   warfarin 3 MG tablet  Commonly known as:  COUMADIN  What changed:    · medication strength  · how much to take  · how to take this  · when to take this  · additional instructions  · Another medication with the same name was removed. Continue taking this medication, and follow the directions you see here.   P a fib         Continue These Medications      Instructions Start Date   atorvastatin 20 MG tablet  Commonly known as:  LIPITOR   20 " mg, Oral, Daily      digoxin 250 MCG tablet  Commonly known as:  LANOXIN   250 mcg, Oral, Daily Digoxin      metoprolol succinate XL 50 MG 24 hr tablet  Commonly known as:  TOPROL-XL   50 mg, Oral, Daily         Stop These Medications    furosemide 40 MG tablet  Commonly known as:  LASIX     spironolactone 25 MG tablet  Commonly known as:  ALDACTONE          Diet Instructions     Diet: Regular      Discharge Diet:  Regular        Future Appointments   Date Time Provider Department Center   9/4/2020  3:00 PM [facility]   9/10/2020  3:30 PM Lorne Arellano MD [facility]     Additional Instructions for the Follow-ups that You Need to Schedule     Discharge Follow-up with PCP   As directed       Currently Documented PCP:    Rupal Sutton MD    PCP Phone Number:    [phone]     Follow Up Details:  1 week         Discharge Follow-up with Specialty: Neurosurgery   As directed      Specialty:  Neurosurgery    Follow Up Details:  As directed.         Protime-INR    Aug 27, 2020 (Approximate)      Is Patient on anti-coag:  Yes            Contact information for follow-up providers     Rupal Sutton MD .    Specialty:  Internal Medicine  Why:  1 week  Contact information:  [address]  [facility] [address]  [facility] [address]  [phone]                   Contact information for after-discharge care     Destination     [facility] [place] .    Service:  Skilled Nursing  Contact information:  [address]   [facility] [id number] [phone]                           Discharge Order (From admission, onward)     Start     Ordered    08/25/20 1119  Discharge patient  Once     Expected Discharge Date:  08/25/20    Discharge Disposition:  Skilled Nursing Facility (DC - External)    Physician of Record for Attribution - Please select from Treatment Team:  WINNIE LOVE [[id number]]    Review needed by CMO to determine Physician of Record:  No       Question Answer Comment   Physician  of Record for Attribution - Please select from Treatment Team PRADEEP EDEN.    Review needed by CMO to determine Physician of Record No        08/25/20 1125                  Total time spent discharging patient including evaluation,post hospitalization follow up,  medication and post hospitalization instructions and education total time exceeds 30 minutes.    Signed:  Pradeep Eden MD  8/25/2020  11:26    EMR Dragon/Transcription disclaimer:   Much of this encounter note is an electronic transcription/translation of spoken language to printed text. The electronic translation of spoken language may permit erroneous, or at times, nonsensical words or phrases to be inadvertently transcribed; Although I have reviewed the note for such errors, some may still exist.

## 2020-08-25 NOTE — PROGRESS NOTES
"DAILY PROGRESS NOTE  [facility]    Patient Identification:  Name: Brandy Han  Age: 89 y.o.  Sex: female  :  1930  MRN: [id number]         Primary Care Physician: Rupal Sutton MD      Subjective  No complaints aside from the periodic discomfort of the hard collar.    Objective:  General Appearance:  Comfortable, well-appearing, in no acute distress and not in pain.    Vital signs: (most recent): Blood pressure 141/77, pulse 87, temperature 98.3 °F (36.8 °C), temperature source Oral, resp. rate 18, height 170.2 cm (67\"), weight 88.1 kg (194 lb 4.8 oz), SpO2 95 %.    Lungs:  Normal effort and normal respiratory rate.  Breath sounds clear to auscultation.    Heart: Normal rate.  Regular rhythm.    Extremities: There is dependent edema.  (Nonpitting pretibial edema.)  Neurological: Patient is alert and oriented to person, place and time.    Skin:  Warm and dry.                Vital signs in last 24 hours:  Temp:  [97.5 °F (36.4 °C)-98.3 °F (36.8 °C)] 98.3 °F (36.8 °C)  Heart Rate:  [74-87] 87  Resp:  [18-20] 18  BP: (124-141)/(69-85) 141/77    Intake/Output:    Intake/Output Summary (Last 24 hours) at 2020 1054  Last data filed at 2020 0107  Gross per 24 hour   Intake --   Output 700 ml   Net -700 ml         Results from last 7 days   Lab Units 20  0603 20  0620  1651   WBC 10*3/mm3 6.50 7.23 10.95*   HEMOGLOBIN g/dL 12.5 13.2 13.6   PLATELETS 10*3/mm3 144 143 148     Results from last 7 days   Lab Units 20  0629 20  0603 20  0620  1651   SODIUM mmol/L  --  137 137 137   POTASSIUM mmol/L 4.4 5.0 3.4* 3.8   CHLORIDE mmol/L  --  108* 109* 104   CO2 mmol/L  --  20.1* 18.7* 22.9   BUN mg/dL  --  16 16 18   CREATININE mg/dL  --  0.70 0.76 1.05*   GLUCOSE mg/dL  --  95 90 145*   Estimated Creatinine Clearance: 53.3 mL/min (by C-G formula based on SCr of 0.7 mg/dL).  Results from last 7 days   Lab Units 20  0603 20  0626 " 08/21/20  1651   CALCIUM mg/dL 8.0* 8.1* 8.3   ALBUMIN g/dL  --   --  3.50   MAGNESIUM mg/dL  --   --  1.8     Results from last 7 days   Lab Units 08/21/20  1651   ALBUMIN g/dL 3.50   BILIRUBIN mg/dL 1.3*   ALK PHOS U/L 60   AST (SGOT) U/L 43*   ALT (SGPT) U/L 15       Assessment:    Closed traumatic nondisplaced fracture of sixth cervical vertebra (CMS/HCC): Continue hard collar.    Closed nondisplaced fracture of left lateral portion of seventh cervical vertebra (CMS/HCC): Please see above.    History of venous thrombosis    Bilateral carotid artery stenosis    Osteoporosis    Hyperlipidemia    Paroxysmal atrial fibrillation (CMS/HCC): Continue anticoagulation and rate control.    Presence of cardiac pacemaker    Traumatic rhabdomyolysis: Improving.    Supratherapeutic INR: Coumadin temporarily held and resumed at lower dose.      All problems new to this examiner.    Plan:  Please see above.  Discharge planning.    Pradeep Eden MD  8/25/2020  10:54

## 2020-08-25 NOTE — PROGRESS NOTES
Continued Stay Note  [facility]     Patient Name: Brandy Han  MRN: [id number]  Today's Date: 8/25/2020    Admit Date: 8/21/2020    Discharge Plan     Row Name 08/25/20 0922       Plan    Plan  [facility]    Patient/Family in Agreement with Plan  yes    Plan Comments  Spoke with Lisa alejandre daughter she would also like referral to [facility]. SPoke with Xin and she spoke with daughter. Pt accepted at [facility] and bed available today. Packet in ccp office. clair bernard/ccp        Discharge Codes    No documentation.             Andie Simms, RN

## 2020-08-25 NOTE — PLAN OF CARE
Problem: Patient Care Overview  Goal: Plan of Care Review  Flowsheets (Taken 8/25/2020 0454)  Progress: improving  Plan of Care Reviewed With: patient  Outcome Summary: A/O, medicated for pain X1 with relief, ccollar in place, purewick, VSS, awaiting placement, will continue to monitor.

## 2020-08-26 ENCOUNTER — EPISODE CHANGES (OUTPATIENT)
Dept: CASE MANAGEMENT | Facility: OTHER | Age: 85
End: 2020-08-26

## 2020-08-27 NOTE — PROGRESS NOTES
Case Management Discharge Note      Final Note: [facility] at [facility] via daughter transported. clair [email or web address]    Provided Post Acute Provider List?: Yes  Post Acute Provider List: Nursing Home  Provided Post Acute Provider Quality & Resource List?: Yes  Post Acute Provider Quality and Resource List: Nursing Home  Delivered To: Support Person  Support Person: Explained to daughter [email or web address] and [facility]  Method of Delivery: Telephone    Destination - Selection Complete      Service Provider Request Status Selected Services Address Phone Number Fax Number    [facility] [address] , [facility] [address] [phone]      Durable Medical Equipment      No service has been selected for the patient.      Dialysis/Infusion      No service has been selected for the patient.      Home Medical Care      No service has been selected for the patient.      Therapy      No service has been selected for the patient.      Community Resources      No service has been selected for the patient.        Transportation Services  Private: Car    Final Discharge Disposition Code: 03 - skilled nursing facility (SNF)

## 2020-09-01 ENCOUNTER — TELEPHONE (OUTPATIENT)
Dept: INTERNAL MEDICINE | Facility: CLINIC | Age: 85
End: 2020-09-01

## 2020-09-01 NOTE — TELEPHONE ENCOUNTER
She is being Discharged from [facility] rehab and they are requesting a verbal order for pt/ot eval. Please call Cydney phone [phone].

## 2020-09-09 ENCOUNTER — EPISODE CHANGES (OUTPATIENT)
Dept: CASE MANAGEMENT | Facility: OTHER | Age: 85
End: 2020-09-09

## 2020-09-10 ENCOUNTER — TELEPHONE (OUTPATIENT)
Dept: NEUROSURGERY | Facility: CLINIC | Age: 85
End: 2020-09-10

## 2020-09-10 ENCOUNTER — TELEPHONE (OUTPATIENT)
Dept: INTERNAL MEDICINE | Facility: CLINIC | Age: 85
End: 2020-09-10

## 2020-09-10 NOTE — TELEPHONE ENCOUNTER
Returned the call about coming in earlier.  Rescheduled the appointment to 9-15-20 and told them to get the xr's done 1.5 hours before the appointment if they are doing it the same day.  The patient's wanted to know if he would refill her pain medication due to she only has enough for today.

## 2020-09-10 NOTE — TELEPHONE ENCOUNTER
Called the patient's daughter back to see what pain medication she is talking about? The hospitalist gave her Tylenol, it is over the counter. Dr. Arellano does not usually give pain medications unless the patient has had surgery. I had to [facility] for her to call the office.

## 2020-09-10 NOTE — TELEPHONE ENCOUNTER
"Patient's daughter returned my call, I asked her what medication she is asking to be refilled? She states the hospital gave her Drake 5/325 mg and the rehab facility gave her a small sheet of them but that they will not last until her appointment. I told her that Dr. Arellano does not often give any pain medications unless they have had surgery. She states that \"Tylenol does not cut it\"    Verbally spoke with Dr. Arellano, he is not willing to give her a narcotic for her headaches, we recommend she talk with her PCP and see what they would recommend.    Spoke with the patient's daughter and advised her of the above. She will keep the appointment with Dr. Arellano on Tuesday.  "

## 2020-09-10 NOTE — TELEPHONE ENCOUNTER
Patient daughter called and would like to know if Dr. Sutton  Could send in a prescription for hydrocodone. She was recently in the hospital because she fell and broke her c6 and c7. The hospital gave her some when she discharged but she only has enough for today. She called Dr. Arellano office to refill but he said that she has to call primary care office. Can Dr. Sutton refill her hydrocodone medication?

## 2020-09-11 NOTE — TELEPHONE ENCOUNTER
I have to see to see for face to face visit and contract to prescribe pain medicine in the [facility].  I could see her today.  ARNOLD

## 2020-09-14 NOTE — PROGRESS NOTES
Subjective   History of Present Illness: Brandy Han is a 89 y.o. female is here today for hospital follow-up with a new Cervical XR ordered to follow up on C6 fracture.     Today in the office she reports she still has neck and left side head pain. She is wearing a c-collar.    Patient and her daughter are both wearing a mask in our office today.      History of Present Illness    This patient suffered a fall on 21 August.  She was at home.  She was transported to the emergency room where she was discovered to have a fracture of the facets of C6 and C7.  She was having severe neck pain at that time.  She continues with pain in her neck.  She has been wearing a hard collar ever since.  She has no other symptoms.    The following portions of the patient's history were reviewed and updated as appropriate: allergies, current medications, past family history, past medical history, past social history, past surgical history and problem list.    Review of Systems   Respiratory: Negative for chest tightness.    Cardiovascular: Negative for chest pain.   Musculoskeletal: Positive for myalgias and neck pain.   Neurological: Negative.        I have reviewed the review of systems as documented by my MA.      Objective     Vitals:    09/15/20 1538   BP: 128/74   Pulse: 80   Temp: 97.1 °F (36.2 °C)     There is no height or weight on file to calculate BMI.      Physical Exam  Neurological:      Mental Status: She is alert and oriented to person, place, and time.       Neurologic Exam     Mental Status   Oriented to person, place, and time.           Assessment/Plan   Independent Review of Radiographic Studies:      I personally reviewed the images from the following studies.    I reviewed her x-rays that were done today.  This shows no evidence of subluxation.    Medical Decision Making:      I told the patient and her daughter that she should continue in the collar for another month or so.  After another month she can start  taking it off for an hour or 2 at a time.  We will then see her back in about 6 weeks with another x-ray.  If that shows stability then I think we can get rid of the collar and stop x-raying her.    Brandy was seen today for follow-up.    Diagnoses and all orders for this visit:    Closed traumatic nondisplaced fracture of sixth cervical vertebra with routine healing, subsequent encounter  -     XR Spine Cervical 2 or 3 View; Future      Return in about 6 weeks (around 10/27/2020).

## 2020-09-15 ENCOUNTER — OFFICE VISIT (OUTPATIENT)
Dept: NEUROSURGERY | Facility: CLINIC | Age: 85
End: 2020-09-15

## 2020-09-15 ENCOUNTER — OFFICE VISIT (OUTPATIENT)
Dept: INTERNAL MEDICINE | Facility: CLINIC | Age: 85
End: 2020-09-15

## 2020-09-15 ENCOUNTER — HOSPITAL ENCOUNTER (OUTPATIENT)
Dept: GENERAL RADIOLOGY | Facility: HOSPITAL | Age: 85
Discharge: HOME | End: 2020-09-15
Admitting: NURSE PRACTITIONER

## 2020-09-15 ENCOUNTER — ANTICOAGULATION VISIT (OUTPATIENT)
Dept: INTERNAL MEDICINE | Facility: CLINIC | Age: 85
End: 2020-09-15

## 2020-09-15 VITALS
WEIGHT: 194 LBS | DIASTOLIC BLOOD PRESSURE: 76 MMHG | OXYGEN SATURATION: 98 % | HEIGHT: 67 IN | HEART RATE: 60 BPM | SYSTOLIC BLOOD PRESSURE: 120 MMHG | BODY MASS INDEX: 30.45 KG/M2

## 2020-09-15 VITALS — HEART RATE: 80 BPM | TEMPERATURE: 97.1 F | DIASTOLIC BLOOD PRESSURE: 74 MMHG | SYSTOLIC BLOOD PRESSURE: 128 MMHG

## 2020-09-15 DIAGNOSIS — S12.501D: Primary | ICD-10-CM

## 2020-09-15 DIAGNOSIS — I48.0 PAROXYSMAL ATRIAL FIBRILLATION (HCC): ICD-10-CM

## 2020-09-15 DIAGNOSIS — S12.501D: ICD-10-CM

## 2020-09-15 LAB — INR PPP: 3.9 (ref 0.9–1.1)

## 2020-09-15 PROCEDURE — 99213 OFFICE O/P EST LOW 20 MIN: CPT | Performed by: NEUROLOGICAL SURGERY

## 2020-09-15 PROCEDURE — 72040 X-RAY EXAM NECK SPINE 2-3 VW: CPT

## 2020-09-15 PROCEDURE — 85610 PROTHROMBIN TIME: CPT | Performed by: INTERNAL MEDICINE

## 2020-09-15 PROCEDURE — 93793 ANTICOAG MGMT PT WARFARIN: CPT | Performed by: INTERNAL MEDICINE

## 2020-09-15 PROCEDURE — 90694 VACC AIIV4 NO PRSRV 0.5ML IM: CPT | Performed by: INTERNAL MEDICINE

## 2020-09-15 PROCEDURE — G0008 ADMIN INFLUENZA VIRUS VAC: HCPCS | Performed by: INTERNAL MEDICINE

## 2020-09-15 PROCEDURE — 96372 THER/PROPH/DIAG INJ SC/IM: CPT | Performed by: INTERNAL MEDICINE

## 2020-09-15 PROCEDURE — 36416 COLLJ CAPILLARY BLOOD SPEC: CPT | Performed by: INTERNAL MEDICINE

## 2020-09-15 PROCEDURE — 99213 OFFICE O/P EST LOW 20 MIN: CPT | Performed by: INTERNAL MEDICINE

## 2020-09-15 RX ORDER — SPIRONOLACTONE 25 MG/1
25 TABLET ORAL DAILY
COMMUNITY
End: 2021-03-04

## 2020-09-15 RX ORDER — HYDROCODONE BITARTRATE AND ACETAMINOPHEN 5; 325 MG/1; MG/1
1 TABLET ORAL EVERY 6 HOURS PRN
Qty: 50 TABLET | Refills: 0 | Status: SHIPPED | OUTPATIENT
Start: 2020-09-15 | End: 2021-05-04

## 2020-09-15 RX ADMIN — CYANOCOBALAMIN 1000 MCG: 1000 INJECTION, SOLUTION INTRAMUSCULAR; SUBCUTANEOUS at 15:36

## 2020-09-15 NOTE — PROGRESS NOTES
Subjective     Brandy Han is a 89 y.o. female who presents with   Chief Complaint   Patient presents with   • Neck Pain       History of Present Illness     Patient presents in hospital f/u.  She was admitted for a fall and cervical spine fracture.  I have reviewed discharge summary, labs, scans, cardiovascular studies and medication changes.  She was in rehab.      She is due for check of INR today.      Review of Systems   Constitutional: Negative for fever.   Respiratory: Negative.    Cardiovascular: Negative.        The following portions of the patient's history were reviewed and updated as appropriate: allergies, current medications and problem list.    Patient Active Problem List    Diagnosis Date Noted   • Closed nondisplaced fracture of left lateral portion of seventh cervical vertebra (CMS/[facility]) 08/23/2020   • Supratherapeutic INR 08/22/2020   • Non-traumatic rhabdomyolysis 08/21/2020   • Closed traumatic nondisplaced fracture of sixth cervical vertebra (CMS/[facility]) 08/21/2020   • Chronic pain of both knees 04/25/2017   • B12 deficiency 11/04/2016   • Sinus arrest 10/04/2016     Note Last Updated: 10/4/2016     Overview:   per Cardionet Monitor July 2013; s/p pacemaker     • Presence of cardiac pacemaker 10/04/2016   • Right-sided low back pain with right-sided sciatica 07/15/2016   • History of venous thrombosis 06/14/2016   • Bilateral carotid artery stenosis 06/14/2016     Note Last Updated: 6/14/2016     Description: total occlusion of the SOLO and stenosis of the LICA.     • Osteoporosis 06/14/2016     Note Last Updated: 6/14/2016     Description: s/p 10 years of Fosamax.     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     • atorvastatin (LIPITOR) 20 MG tablet TAKE 1 TABLET BY MOUTH DAILY 90 tablet 0   • bisacodyl (DULCOLAX) 5 MG EC tablet Take 1 tablet by mouth Daily As Needed for Constipation.     • digoxin (LANOXIN) 250 MCG tablet Take 250 mcg by mouth Daily.     • docusate sodium 100 MG capsule Take 100 mg by mouth 2 (Two) Times a Day.     • metoprolol succinate XL (TOPROL-XL) 50 MG 24 hr tablet Take 50 mg by mouth Daily.     • Mirabegron ER (MYRBETRIQ) 25 MG tablet sustained-release 24 hour 24 hr tablet Take 25 mg by mouth Daily.     • spironolactone (ALDACTONE) 25 MG tablet Take 25 mg by mouth Daily.     • warfarin (COUMADIN) 3 MG tablet P a fib  Indications: DVT/PE (active thrombosis)       Current Facility-Administered Medications on File Prior to Visit   Medication Dose Route Frequency Provider Last Rate Last Dose   • [DISCONTINUED] cyanocobalamin injection 1,000 mcg  1,000 mcg Intramuscular Q28 Days Rupal Sutton MD   1,000 mcg at 09/15/20 1536       Objective     /76   Pulse 60   Ht 170.2 cm (67.01\")   Wt 88 kg (194 lb)   SpO2 98%   BMI 30.38 kg/m²     Physical Exam  Constitutional:       Appearance: She is well-developed.   HENT:      Head: Normocephalic and atraumatic.   Pulmonary:      Effort: Pulmonary effort is normal.   Neurological:      Mental Status: She is alert and oriented to person, place, and time.   Psychiatric:         Behavior: Behavior normal.         Assessment/Plan   Brandy was seen today for neck pain.    Diagnoses and all orders for this visit:    Closed traumatic nondisplaced fracture of sixth cervical vertebra with routine healing, subsequent encounter  -     HYDROcodone-acetaminophen (NORCO) 5-325 MG per tablet; Take 1 tablet by mouth Every 6 (Six) Hours As Needed for Moderate Pain .    Paroxysmal atrial fibrillation (CMS/HCC)    Other orders  -     Cancel: Fluad Tri 65yr+  -     Fluad Quad 65+ yrs ([id number])        Discussion    F/u " hospitalization for cervical spine fracture.  I will refill pain medication until healed.  Contract is signed today.    Afib.  Check INR.  See flow sheet.         Future Appointments   Date Time Provider Department Center   10/27/2020  3:45 PM Lorne Arellano MD [facility]

## 2020-09-17 ENCOUNTER — EPISODE CHANGES (OUTPATIENT)
Dept: CASE MANAGEMENT | Facility: OTHER | Age: 85
End: 2020-09-17

## 2020-09-17 ENCOUNTER — PATIENT OUTREACH (OUTPATIENT)
Dept: CASE MANAGEMENT | Facility: OTHER | Age: 85
End: 2020-09-17

## 2020-09-17 NOTE — OUTREACH NOTE
Patient Outreach Note  Denies needs or concerns today.  Discussed at length her Rehab stay at the [facility].  Reports plans for home health with PT and OT to be terminated soon due to goals met.   Educated to continue with daily exercises as recommended by therapists.  Adherent to wearing cervical collar.  Daughter is very supportive with with ADL's and transportation needs to physician appts. States had flu shot at Dr. Sutton's office on 9/15/2020.     Tamiko Renner RN  Ambulatory     9/17/2020, 12:46 EDT

## 2020-09-18 ENCOUNTER — TELEPHONE (OUTPATIENT)
Dept: INTERNAL MEDICINE | Facility: CLINIC | Age: 85
End: 2020-09-18

## 2020-09-18 NOTE — TELEPHONE ENCOUNTER
Change to 4mg daily except 2mg MWF.  Recheck ten days.  HARRYW   Quality 474: Zoster Vaccination Status: Shingrix vaccine was not administered for reasons documented by clinician (e.g. patient administered vaccine other than Shingrix, patient allergy or other medical reasons, patient declined or other patient reasons, vaccine not available or other system reasons) Detail Level: Detailed Quality 110: Preventive Care And Screening: Influenza Immunization: Influenza immunization was not ordered or administered, reason not given

## 2020-09-21 ENCOUNTER — TELEPHONE (OUTPATIENT)
Dept: INTERNAL MEDICINE | Facility: CLINIC | Age: 85
End: 2020-09-21

## 2020-09-21 NOTE — TELEPHONE ENCOUNTER
Patient daughter called and stated that she would like Dr. Sutton to write a letter to excuse Ms. Brandy Han from jury duty due to her medical condition. She recently broke her neck and due to her age.    Lisa says it needs to be addressed:  Juror qualification [address] [address]    When letter is completed please fax to   att: Lisa Portal  # [phone]

## 2020-09-25 ENCOUNTER — TELEPHONE (OUTPATIENT)
Dept: INTERNAL MEDICINE | Facility: CLINIC | Age: 85
End: 2020-09-25

## 2020-09-25 NOTE — TELEPHONE ENCOUNTER
[facility] called and stated that Ms. Aguilar did not want to do physical therapy today.

## 2020-09-28 RX ORDER — WARFARIN SODIUM 4 MG/1
TABLET ORAL
Qty: 30 TABLET | Refills: 1 | Status: SHIPPED | OUTPATIENT
Start: 2020-09-28 | End: 2020-10-26

## 2020-10-01 ENCOUNTER — TELEPHONE (OUTPATIENT)
Dept: INTERNAL MEDICINE | Facility: CLINIC | Age: 85
End: 2020-10-01

## 2020-10-01 NOTE — TELEPHONE ENCOUNTER
PATIENT WOULD LIKE A CALL BACK FROM JUAN MANUEL.    PATIENT IS RETURNING A CALL THAT SHE MISSED       PLEASE CONTACT PATIENT @[phone]

## 2020-10-05 ENCOUNTER — TELEPHONE (OUTPATIENT)
Dept: INTERNAL MEDICINE | Facility: CLINIC | Age: 85
End: 2020-10-05

## 2020-10-05 NOTE — TELEPHONE ENCOUNTER
KRISTEN WITH [facility] WAS CALLING TO GET A VERBAL ORDER TO RESUME AN OCCUPATIONAL THERAPY REASSESSMENT FOR THE PATIENT.     SAMANTHA WAS ORIGINALLY SCHEDULED TO SEE THE PATIENT LAST WEEK BUT SHE WAS HAVING PLUMBING ISSUES AND ASKED THEM TO RESCHEDULE.      ANY QUESTIONS PLEASE CALL KRISTEN [phone]

## 2020-10-13 NOTE — PROGRESS NOTES
Subjective   History of Present Illness: Brandy Han is a 89 y.o. female is here today via Telephone Visit for follow-up with a new Cervical XR ordered to follow up on C6 compression fracture. She had a fall on 8/21/2020 at her home.    You have chosen to receive care through a telephone visit. Do you consent to use a telephone visit for your medical care today? Yes    We did a telephone visit today.  The patient was at home with her daughter on the phone and I was in the office.  We talked for 5 minutes.    History of Present Illness    The patient is still wearing a collar.  She really feels pretty good overall.  She had a fracture that she suffered during a fall on 21 August at home.    The following portions of the patient's history were reviewed and updated as appropriate: allergies, current medications, past family history, past medical history, past social history, past surgical history and problem list.    Review of Systems   Respiratory: Negative for chest tightness and shortness of breath.    Cardiovascular: Negative for chest pain.   Musculoskeletal: Positive for myalgias and neck pain.   Neurological: Negative.        I have reviewed the review of systems as documented by my MA.      Objective         Physical Exam  Neurological:      Mental Status: She is oriented to person, place, and time.       Neurologic Exam     Mental Status   Oriented to person, place, and time.            Assessment/Plan   Independent Review of Radiographic Studies:      I personally reviewed the images from the following studies.    I reviewed her x-rays which were done yesterday.  This shows no evidence of abnormality other than degenerative change in her cervical spine.  There is no evidence of subluxation or instability.  I cannot see the fractures.    Medical Decision Making:      I told the patient that from my point of view she can come out of her collar.  We will see her back on an as-needed basis.  I told her to be  very careful about falling from here.    Diagnoses and all orders for this visit:    1. Closed traumatic nondisplaced fracture of sixth cervical vertebra with routine healing, subsequent encounter (Primary)    2. Closed nondisplaced fracture of left lateral portion of seventh cervical vertebra with routine healing, subsequent encounter      Return if symptoms worsen or fail to improve.

## 2020-10-26 ENCOUNTER — ANTICOAGULATION VISIT (OUTPATIENT)
Dept: INTERNAL MEDICINE | Facility: CLINIC | Age: 85
End: 2020-10-26

## 2020-10-26 ENCOUNTER — OFFICE VISIT (OUTPATIENT)
Dept: INTERNAL MEDICINE | Facility: CLINIC | Age: 85
End: 2020-10-26

## 2020-10-26 ENCOUNTER — HOSPITAL ENCOUNTER (OUTPATIENT)
Dept: GENERAL RADIOLOGY | Facility: HOSPITAL | Age: 85
Discharge: HOME | End: 2020-10-26
Admitting: NEUROLOGICAL SURGERY

## 2020-10-26 VITALS
WEIGHT: 171 LBS | SYSTOLIC BLOOD PRESSURE: 120 MMHG | OXYGEN SATURATION: 98 % | HEIGHT: 67 IN | DIASTOLIC BLOOD PRESSURE: 70 MMHG | BODY MASS INDEX: 26.84 KG/M2 | HEART RATE: 58 BPM

## 2020-10-26 DIAGNOSIS — S12.501D: ICD-10-CM

## 2020-10-26 DIAGNOSIS — I48.0 PAROXYSMAL ATRIAL FIBRILLATION (HCC): ICD-10-CM

## 2020-10-26 DIAGNOSIS — E78.5 HYPERLIPIDEMIA, UNSPECIFIED HYPERLIPIDEMIA TYPE: Primary | ICD-10-CM

## 2020-10-26 DIAGNOSIS — E53.8 B12 DEFICIENCY: ICD-10-CM

## 2020-10-26 DIAGNOSIS — S12.601D: ICD-10-CM

## 2020-10-26 PROBLEM — R79.1 SUPRATHERAPEUTIC INR: Status: RESOLVED | Noted: 2020-08-22 | Resolved: 2020-10-26

## 2020-10-26 LAB — INR PPP: 3.4 (ref 0.9–1.1)

## 2020-10-26 PROCEDURE — 99214 OFFICE O/P EST MOD 30 MIN: CPT | Performed by: INTERNAL MEDICINE

## 2020-10-26 PROCEDURE — 36416 COLLJ CAPILLARY BLOOD SPEC: CPT | Performed by: INTERNAL MEDICINE

## 2020-10-26 PROCEDURE — 85610 PROTHROMBIN TIME: CPT | Performed by: INTERNAL MEDICINE

## 2020-10-26 PROCEDURE — 72040 X-RAY EXAM NECK SPINE 2-3 VW: CPT

## 2020-10-26 PROCEDURE — 96372 THER/PROPH/DIAG INJ SC/IM: CPT | Performed by: INTERNAL MEDICINE

## 2020-10-26 RX ORDER — CYANOCOBALAMIN 1000 UG/ML
1000 INJECTION, SOLUTION INTRAMUSCULAR; SUBCUTANEOUS
Status: DISCONTINUED | OUTPATIENT
Start: 2020-10-26 | End: 2022-09-08

## 2020-10-26 RX ADMIN — CYANOCOBALAMIN 1000 MCG: 1000 INJECTION, SOLUTION INTRAMUSCULAR; SUBCUTANEOUS at 15:17

## 2020-10-26 NOTE — PROGRESS NOTES
Subjective     Brandy Han is a 89 y.o. female who presents with   Chief Complaint   Patient presents with   • Neck Injury   • Atrial Fibrillation   • Hyperlipidemia       History of Present Illness     HLD. She is due for check of labs.  Anticoagulation for atrial fib.  Due for protime check today. See flowsheet.  We discussed changing to Xarelto for convenience and because she does not need INRs.    B12 def.  She is due for her monthly shot.   Neck fracture.  She is still in a brace.  She is using hydrocodone mainly at night.   Reviewed/updated 10/26/2020.       Review of Systems   Constitutional: Negative for fever.   Respiratory: Negative.    Cardiovascular: Negative.        The following portions of the patient's history were reviewed and updated as appropriate: allergies, current medications and problem list.    Patient Active Problem List    Diagnosis Date Noted   • Closed nondisplaced fracture of left lateral portion of seventh cervical vertebra (CMS/[facility]) 08/23/2020   • Non-traumatic rhabdomyolysis 08/21/2020   • Closed traumatic nondisplaced fracture of sixth cervical vertebra (CMS/[facility]) 08/21/2020   • Chronic pain of both knees 04/25/2017   • B12 deficiency 11/04/2016   • Sinus arrest 10/04/2016     Note Last Updated: 10/4/2016     Overview:   per Cardionet Monitor July 2013; s/p pacemaker     • Presence of cardiac pacemaker 10/04/2016   • Right-sided low back pain with right-sided sciatica 07/15/2016   • History of venous thrombosis 06/14/2016   • Bilateral carotid artery stenosis 06/14/2016     Note Last Updated: 6/14/2016     Description: total occlusion of the SOLO and stenosis of the LICA.     • Osteoporosis 06/14/2016     Note Last Updated: 6/14/2016     Description: s/p 10 years of Fosamax.     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     • atorvastatin (LIPITOR) 20 MG tablet TAKE 1 TABLET BY MOUTH DAILY 90 tablet 0   • bisacodyl (DULCOLAX) 5 MG EC tablet Take 1 tablet by mouth Daily As Needed for Constipation.     • digoxin (LANOXIN) 250 MCG tablet Take 250 mcg by mouth Daily.     • docusate sodium 100 MG capsule Take 100 mg by mouth 2 (Two) Times a Day.     • HYDROcodone-acetaminophen (NORCO) 5-325 MG per tablet Take 1 tablet by mouth Every 6 (Six) Hours As Needed for Moderate Pain . 50 tablet 0   • metoprolol succinate XL (TOPROL-XL) 50 MG 24 hr tablet Take 50 mg by mouth Daily.     • Mirabegron ER (MYRBETRIQ) 25 MG tablet sustained-release 24 hour 24 hr tablet Take 25 mg by mouth Daily.     • spironolactone (ALDACTONE) 25 MG tablet Take 25 mg by mouth Daily.     • [DISCONTINUED] warfarin (COUMADIN) 3 MG tablet P a fib  Indications: DVT/PE (active thrombosis)     • [DISCONTINUED] warfarin (Coumadin) 4 MG tablet TAKE ONE TABLET BY MOUTH DAILY 30 tablet 1     No current facility-administered medications on file prior to visit.        Objective     /70   Pulse 58   Ht 170.2 cm (67.01\")   Wt 77.6 kg (171 lb)   SpO2 98%   BMI 26.78 kg/m²     Physical Exam  Constitutional:       Appearance: She is well-developed.   HENT:      Head: Normocephalic.   Pulmonary:      Effort: Pulmonary effort is normal.   Neurological:      Mental Status: She is alert and oriented to person, place, and time.   Psychiatric:         Behavior: Behavior normal.         Assessment/Plan   Diagnoses and all orders for this visit:    1. Hyperlipidemia, unspecified hyperlipidemia type (Primary)  -     CBC & Differential  -     Comprehensive Metabolic Panel  -     Lipid Panel    2. Paroxysmal atrial fibrillation (CMS/HCC)  -     POC INR  -     CBC & Differential  -     Comprehensive Metabolic Panel  -     Lipid " Panel    3. B12 deficiency  -     cyanocobalamin injection 1,000 mcg    4. Closed traumatic nondisplaced fracture of sixth cervical vertebra with routine healing, subsequent encounter    5. Closed nondisplaced fracture of left lateral portion of seventh cervical vertebra with routine healing, subsequent encounter    Other orders  -     rivaroxaban (Xarelto) 15 MG tablet; Take 1 tablet by mouth Daily.  Dispense: 30 tablet; Refill: 5        Discussion    HLD.  Check labs today.   Afib.  D/c coumadin.  Start Xarelto on Thursday.    B12 def.  Shot today.    Cervical fracture.  Continue prn hydrocodone.  F/u Dr. Arellano as planned.         Future Appointments   Date Time Provider Department Center   10/27/2020  3:45 PM Lorne Arellano MD [facility]

## 2020-10-27 ENCOUNTER — OFFICE VISIT (OUTPATIENT)
Dept: NEUROSURGERY | Facility: CLINIC | Age: 85
End: 2020-10-27

## 2020-10-27 DIAGNOSIS — S12.601D: ICD-10-CM

## 2020-10-27 DIAGNOSIS — S12.501D: Primary | ICD-10-CM

## 2020-10-27 LAB
ALBUMIN SERPL-MCNC: 3.8 G/DL (ref 3.5–5.2)
ALBUMIN/GLOB SERPL: 1.5 G/DL
ALP SERPL-CCNC: 80 U/L (ref 39–117)
ALT SERPL-CCNC: 9 U/L (ref 1–33)
AST SERPL-CCNC: 14 U/L (ref 1–32)
BASOPHILS # BLD AUTO: 0.03 10*3/MM3 (ref 0–0.2)
BASOPHILS NFR BLD AUTO: 0.4 % (ref 0–1.5)
BILIRUB SERPL-MCNC: 1 MG/DL (ref 0–1.2)
BUN SERPL-MCNC: 13 MG/DL (ref 8–23)
BUN/CREAT SERPL: 11.3 (ref 7–25)
CALCIUM SERPL-MCNC: 8.9 MG/DL (ref 8.2–9.6)
CHLORIDE SERPL-SCNC: 103 MMOL/L (ref 98–107)
CHOLEST SERPL-MCNC: 124 MG/DL (ref 0–200)
CO2 SERPL-SCNC: 27.5 MMOL/L (ref 22–29)
CREAT SERPL-MCNC: 1.15 MG/DL (ref 0.57–1)
EOSINOPHIL # BLD AUTO: 0.06 10*3/MM3 (ref 0–0.4)
EOSINOPHIL NFR BLD AUTO: 0.8 % (ref 0.3–6.2)
ERYTHROCYTE [DISTWIDTH] IN BLOOD BY AUTOMATED COUNT: 11.9 % (ref 12.3–15.4)
GLOBULIN SER CALC-MCNC: 2.6 GM/DL
GLUCOSE SERPL-MCNC: 84 MG/DL (ref 65–99)
HCT VFR BLD AUTO: 45.1 % (ref 34–46.6)
HDLC SERPL-MCNC: 66 MG/DL (ref 40–60)
HGB BLD-MCNC: 14.5 G/DL (ref 12–15.9)
IMM GRANULOCYTES # BLD AUTO: 0.03 10*3/MM3 (ref 0–0.05)
IMM GRANULOCYTES NFR BLD AUTO: 0.4 % (ref 0–0.5)
LDLC SERPL CALC-MCNC: 37 MG/DL (ref 0–100)
LYMPHOCYTES # BLD AUTO: 1.94 10*3/MM3 (ref 0.7–3.1)
LYMPHOCYTES NFR BLD AUTO: 25 % (ref 19.6–45.3)
MCH RBC QN AUTO: 30.7 PG (ref 26.6–33)
MCHC RBC AUTO-ENTMCNC: 32.2 G/DL (ref 31.5–35.7)
MCV RBC AUTO: 95.3 FL (ref 79–97)
MONOCYTES # BLD AUTO: 0.76 10*3/MM3 (ref 0.1–0.9)
MONOCYTES NFR BLD AUTO: 9.8 % (ref 5–12)
NEUTROPHILS # BLD AUTO: 4.95 10*3/MM3 (ref 1.7–7)
NEUTROPHILS NFR BLD AUTO: 63.6 % (ref 42.7–76)
NRBC BLD AUTO-RTO: 0 /100 WBC (ref 0–0.2)
PLATELET # BLD AUTO: 170 10*3/MM3 (ref 140–450)
POTASSIUM SERPL-SCNC: 4.6 MMOL/L (ref 3.5–5.2)
PROT SERPL-MCNC: 6.4 G/DL (ref 6–8.5)
RBC # BLD AUTO: 4.73 10*6/MM3 (ref 3.77–5.28)
SODIUM SERPL-SCNC: 139 MMOL/L (ref 136–145)
TRIGL SERPL-MCNC: 120 MG/DL (ref 0–150)
VLDLC SERPL CALC-MCNC: 21 MG/DL (ref 5–40)
WBC # BLD AUTO: 7.77 10*3/MM3 (ref 3.4–10.8)

## 2020-10-27 PROCEDURE — 99441 PR PHYS/QHP TELEPHONE EVALUATION 5-10 MIN: CPT | Performed by: NEUROLOGICAL SURGERY

## 2020-11-09 RX ORDER — METOPROLOL SUCCINATE 50 MG/1
50 TABLET, EXTENDED RELEASE ORAL DAILY
Qty: 90 TABLET | Refills: 1 | Status: SHIPPED | OUTPATIENT
Start: 2020-11-09 | End: 2021-05-11

## 2020-11-10 ENCOUNTER — HOSPITAL ENCOUNTER (EMERGENCY)
Facility: HOSPITAL | Age: 85
Discharge: HOME | End: 2020-11-10
Attending: EMERGENCY MEDICINE | Admitting: EMERGENCY MEDICINE

## 2020-11-10 ENCOUNTER — APPOINTMENT (OUTPATIENT)
Dept: GENERAL RADIOLOGY | Facility: HOSPITAL | Age: 85
End: 2020-11-10

## 2020-11-10 VITALS
RESPIRATION RATE: 16 BRPM | HEART RATE: 84 BPM | DIASTOLIC BLOOD PRESSURE: 73 MMHG | WEIGHT: 175 LBS | OXYGEN SATURATION: 97 % | HEIGHT: 68 IN | BODY MASS INDEX: 26.52 KG/M2 | SYSTOLIC BLOOD PRESSURE: 106 MMHG | TEMPERATURE: 97.2 F

## 2020-11-10 DIAGNOSIS — K59.03 DRUG-INDUCED CONSTIPATION: Primary | ICD-10-CM

## 2020-11-10 PROCEDURE — 74018 RADEX ABDOMEN 1 VIEW: CPT

## 2020-11-10 PROCEDURE — 99283 EMERGENCY DEPT VISIT LOW MDM: CPT

## 2020-11-10 RX ADMIN — Medication 300 ML: at 21:18

## 2020-11-10 NOTE — ED NOTES
Call  Pt daughter, Lisa, at [phone] when bed assignment.      Nathalie Gonzalez, RN  11/10/20 [id number]

## 2020-11-10 NOTE — ED NOTES
Pt presents to ED with family. Family reports pt had a change in blood thinner medication four weeks ago, and complains of constipation since the medication change. Pt states she has not been able to use the bathroom in the last 4-5 days, denies abd pain or N/V at this time. Pt presented to ED in c-collar from previous neck fracture. Pt is A&OX3, able to ambulate with walker but place in wheelchair, and in a mask at this time.      Nathalie Gonzalez, RN  11/10/20 [id number]

## 2020-11-11 NOTE — ED NOTES
Voicemail left for daughter regarding pt room placement.     Janet Armendariz, RN  11/10/20 1947

## 2020-11-11 NOTE — ED PROVIDER NOTES
EMERGENCY DEPARTMENT ENCOUNTER    Room Number:  39/39  Date of encounter:  11/10/2020  PCP: Rupal Sutton MD  Historian: Patient and daughter      HPI:  Chief Complaint: Constipation  A complete HPI/ROS/PMH/PSH/SH/FH are unobtainable due to: N/A    Context: Brandy Han is a 89 y.o. female who presents to the ED c/o constipation for several days.  She has compression fracture of her cervical spine and is taking occasional hydrocodone.  She has tried over-the-counter stool softeners and laxatives without results.  She does complain of some rectal pain, but no abdominal pain.  No nausea or vomiting.  No fevers or chills.      The patient was placed in a mask in triage, hand hygiene was performed before and after my interaction with the patient.  I wore a mask, safety glasses and gloves during my entire interaction with the patient.    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/[facility]) 06/27/2013   • B12 deficiency 11/04/2016   • Chronic pain of both knees 04/25/2017   • Non-traumatic rhabdomyolysis 08/21/2020   • Closed traumatic nondisplaced fracture of sixth cervical vertebra (CMS/[facility]) 08/21/2020   • Closed nondisplaced fracture of left lateral portion of seventh cervical vertebra (CMS/[facility]) 08/23/2020     Resolved Ambulatory Problems     Diagnosis Date Noted   • Pain 04/14/2016   • Knee pain, acute 05/24/2016   • Depression 06/14/2016   • Paroxysmal atrial fibrillation (CMS/[facility]) 06/14/2016   • Right shoulder pain 07/15/2016   • H/O fall 07/15/2016   • Lumbar spine pain 07/15/2016   • Glenohumeral arthritis 07/15/2016   • Arthritis  01/20/2017   • Primary localized osteoarthrosis of lower leg 11/27/2017   • Supratherapeutic INR 08/22/2020     Past Medical History:   Diagnosis Date   • Anxiety    • Arterial thrombosis (CMS/HCC)    • Atrial fibrillation (CMS/HCC)    • Headache    • Heart palpitations    • Migraine    • Syncope    • UTI (urinary tract infection)          PAST SURGICAL HISTORY  Past Surgical History:   Procedure Laterality Date   • ANKLE SURGERY     • CARDIAC PACEMAKER PLACEMENT     • OTHER SURGICAL HISTORY Left     elbow surgery   • PACEMAKER IMPLANTATION           FAMILY HISTORY  Family History   Problem Relation Age of Onset   • Heart disease Mother    • Lung cancer Father    • Stroke Daughter         cerebral accident         SOCIAL HISTORY  Social History     Socioeconomic History   • Marital status:      Spouse name: Not on file   • Number of children: Not on file   • Years of education: Not on file   • Highest education level: Not on file   Tobacco Use   • Smoking status: Never Smoker   • Smokeless tobacco: Never Used   Substance and Sexual Activity   • Alcohol use: Yes     Alcohol/week: 1.0 standard drinks     Types: 1 Shots of liquor per week   • Drug use: No   • Sexual activity: Defer         ALLERGIES  Patient has no known allergies.        REVIEW OF SYSTEMS  Review of Systems   Constitutional: Negative for chills and fever.   Respiratory: Negative for cough and shortness of breath.    Cardiovascular: Negative for chest pain.   Gastrointestinal: Positive for constipation and rectal pain. Negative for abdominal pain, nausea and vomiting.   Genitourinary: Negative for difficulty urinating.   Musculoskeletal: Positive for neck pain.   All other systems reviewed and are negative.       All systems reviewed and negative except for those discussed in HPI.       PHYSICAL EXAM    I have reviewed the triage vital signs and nursing notes.    ED Triage Vitals   Temp Heart Rate Resp BP SpO2   11/10/20 1826 11/10/20 1826  11/10/20 1826 11/10/20 1947 11/10/20 1826   97.2 °F (36.2 °C) 96 18 103/65 97 %      Temp src Heart Rate Source Patient Position BP Location FiO2 (%)   11/10/20 1826 11/10/20 1826 -- -- --   Tympanic Monitor          Physical Exam   Constitutional: Pt. is oriented to person, place, and time and well-developed, well-nourished, and in no distress.  She is very lively and conversant.   HENT: Normocephalic and atraumatic.  Neck: C-collar in place. No JVD present. No tracheal deviation present.  Cardiovascular: Normal rate, regular rhythm and normal heart sounds. Exam reveals no gallop and no friction rub.   No murmur heard.  Pulmonary/Chest: Effort normal and breath sounds normal. No stridor. No respiratory distress. No wheezes, no rales.   Abdominal: Soft. Bowel sounds are normal. No distension. There is normal/mild left lower quadrant tenderness. There is no rebound and no guarding.   Musculoskeletal: No edema, tenderness or deformity.   Neurological: Pt. is alert and oriented to person, place, and time. Pt. has normal sensation and normal strength. No cranial nerve deficit. GCS score is 15.   Skin: Skin is warm and dry. No rash noted. Pt. is not diaphoretic. No erythema.   Psychiatric: Mood, affect and judgment normal.  She is very pleasant.  Nursing note and vitals reviewed.        LAB RESULTS  No results found for this or any previous visit (from the past 24 hour(s)).    Ordered the above labs and independently reviewed the results.        RADIOLOGY  Xr Abdomen Kub    Result Date: 11/10/2020  ONE VIEW ABDOMEN  HISTORY: Abdominal pain. Constipation.  FINDINGS: There is a moderate amount of stool scattered throughout the colon including some dense stool in the rectosigmoid region and consistent with history of constipation and probable component of fecal impaction. There is no small bowel distention.  This report was finalized on 11/10/2020 8:36 PM by Dr. Lisandro Mendez M.D.        I ordered the above noted  radiological studies. Reviewed by me and discussed with radiologist.  See dictation for official radiology interpretation.      PROCEDURES    Procedures      MEDICATIONS GIVEN IN ER    Medications   milk and molasses enema (has no administration in time range)         PROGRESS, DATA ANALYSIS, CONSULTS, AND MEDICAL DECISION MAKING    Any/all labs have been independently reviewed by me.  Any/all radiology studies have been reviewed by me and discussed with radiologist dictating the report.   EKG's independently viewed and interpreted by me.  Discussion below represents my analysis of pertinent findings related to patient's condition, differential diagnosis, treatment plan and final disposition.      ED Course as of Nov 10 2205   Tue Nov 10, 2020   2203 Patient had a decent bowel movement with an enema.  We discussed ways to help constipation in the future.  I recommend that she start MiraLAX every day.    [WC]      ED Course User Index  [WC] Tong Weeks MD       AS OF 22:05 EST VITALS:    BP - 107/82  HR - 96  TEMP - 97.2 °F (36.2 °C) (Tympanic)  02 SATS - 96%        DIAGNOSIS  Final diagnoses:   Drug-induced constipation         DISPOSITION  Discharged           Tong Weeks MD  11/10/20 2205

## 2020-11-20 RX ORDER — ATORVASTATIN CALCIUM 20 MG/1
20 TABLET, FILM COATED ORAL DAILY
Qty: 90 TABLET | Refills: 1 | Status: SHIPPED | OUTPATIENT
Start: 2020-11-20 | End: 2021-06-07

## 2021-03-04 RX ORDER — SPIRONOLACTONE 25 MG/1
TABLET ORAL
Qty: 90 TABLET | Refills: 3 | Status: SHIPPED | OUTPATIENT
Start: 2021-03-04

## 2021-04-21 DIAGNOSIS — E78.5 HYPERLIPIDEMIA, UNSPECIFIED HYPERLIPIDEMIA TYPE: Primary | ICD-10-CM

## 2021-04-21 DIAGNOSIS — M81.0 OSTEOPOROSIS, UNSPECIFIED OSTEOPOROSIS TYPE, UNSPECIFIED PATHOLOGICAL FRACTURE PRESENCE: ICD-10-CM

## 2021-04-21 DIAGNOSIS — E53.8 B12 DEFICIENCY: ICD-10-CM

## 2021-04-22 LAB
25(OH)D3+25(OH)D2 SERPL-MCNC: 10.4 NG/ML (ref 30–100)
ALBUMIN SERPL-MCNC: 4 G/DL (ref 3.5–5.2)
ALBUMIN/GLOB SERPL: 1.4 G/DL
ALP SERPL-CCNC: 75 U/L (ref 39–117)
ALT SERPL-CCNC: 12 U/L (ref 1–33)
AST SERPL-CCNC: 18 U/L (ref 1–32)
BASOPHILS # BLD AUTO: 0.05 10*3/MM3 (ref 0–0.2)
BASOPHILS NFR BLD AUTO: 0.9 % (ref 0–1.5)
BILIRUB SERPL-MCNC: 1.1 MG/DL (ref 0–1.2)
BUN SERPL-MCNC: 17 MG/DL (ref 8–23)
BUN/CREAT SERPL: 17.7 (ref 7–25)
CALCIUM SERPL-MCNC: 9.4 MG/DL (ref 8.2–9.6)
CHLORIDE SERPL-SCNC: 101 MMOL/L (ref 98–107)
CHOLEST SERPL-MCNC: 129 MG/DL (ref 0–200)
CO2 SERPL-SCNC: 28 MMOL/L (ref 22–29)
CREAT SERPL-MCNC: 0.96 MG/DL (ref 0.57–1)
EOSINOPHIL # BLD AUTO: 0.15 10*3/MM3 (ref 0–0.4)
EOSINOPHIL NFR BLD AUTO: 2.7 % (ref 0.3–6.2)
ERYTHROCYTE [DISTWIDTH] IN BLOOD BY AUTOMATED COUNT: 11.5 % (ref 12.3–15.4)
GLOBULIN SER CALC-MCNC: 2.9 GM/DL
GLUCOSE SERPL-MCNC: 95 MG/DL (ref 65–99)
HCT VFR BLD AUTO: 41.1 % (ref 34–46.6)
HDLC SERPL-MCNC: 73 MG/DL (ref 40–60)
HGB BLD-MCNC: 13.5 G/DL (ref 12–15.9)
IMM GRANULOCYTES # BLD AUTO: 0.03 10*3/MM3 (ref 0–0.05)
IMM GRANULOCYTES NFR BLD AUTO: 0.5 % (ref 0–0.5)
LDLC SERPL CALC-MCNC: 41 MG/DL (ref 0–100)
LYMPHOCYTES # BLD AUTO: 1.96 10*3/MM3 (ref 0.7–3.1)
LYMPHOCYTES NFR BLD AUTO: 35.4 % (ref 19.6–45.3)
MCH RBC QN AUTO: 31.4 PG (ref 26.6–33)
MCHC RBC AUTO-ENTMCNC: 32.8 G/DL (ref 31.5–35.7)
MCV RBC AUTO: 95.6 FL (ref 79–97)
MONOCYTES # BLD AUTO: 0.47 10*3/MM3 (ref 0.1–0.9)
MONOCYTES NFR BLD AUTO: 8.5 % (ref 5–12)
NEUTROPHILS # BLD AUTO: 2.88 10*3/MM3 (ref 1.7–7)
NEUTROPHILS NFR BLD AUTO: 52 % (ref 42.7–76)
NRBC BLD AUTO-RTO: 0 /100 WBC (ref 0–0.2)
PLATELET # BLD AUTO: 176 10*3/MM3 (ref 140–450)
POTASSIUM SERPL-SCNC: 4.2 MMOL/L (ref 3.5–5.2)
PROT SERPL-MCNC: 6.9 G/DL (ref 6–8.5)
RBC # BLD AUTO: 4.3 10*6/MM3 (ref 3.77–5.28)
SODIUM SERPL-SCNC: 137 MMOL/L (ref 136–145)
TRIGL SERPL-MCNC: 77 MG/DL (ref 0–150)
TSH SERPL DL<=0.005 MIU/L-ACNC: 2.15 UIU/ML (ref 0.27–4.2)
VIT B12 SERPL-MCNC: 292 PG/ML (ref 211–946)
VLDLC SERPL CALC-MCNC: 15 MG/DL (ref 5–40)
WBC # BLD AUTO: 5.54 10*3/MM3 (ref 3.4–10.8)

## 2021-05-04 ENCOUNTER — OFFICE VISIT (OUTPATIENT)
Dept: INTERNAL MEDICINE | Facility: CLINIC | Age: 86
End: 2021-05-04

## 2021-05-04 VITALS
SYSTOLIC BLOOD PRESSURE: 122 MMHG | BODY MASS INDEX: 24.4 KG/M2 | DIASTOLIC BLOOD PRESSURE: 64 MMHG | HEART RATE: 71 BPM | HEIGHT: 68 IN | OXYGEN SATURATION: 98 % | WEIGHT: 161 LBS

## 2021-05-04 DIAGNOSIS — E55.9 VITAMIN D DEFICIENCY: ICD-10-CM

## 2021-05-04 DIAGNOSIS — E53.8 B12 DEFICIENCY: ICD-10-CM

## 2021-05-04 DIAGNOSIS — I48.91 ATRIAL FIBRILLATION, UNSPECIFIED TYPE (HCC): ICD-10-CM

## 2021-05-04 DIAGNOSIS — Z00.00 MEDICARE ANNUAL WELLNESS VISIT, SUBSEQUENT: Primary | ICD-10-CM

## 2021-05-04 DIAGNOSIS — N32.81 OVERACTIVE BLADDER: ICD-10-CM

## 2021-05-04 DIAGNOSIS — E78.5 HYPERLIPIDEMIA, UNSPECIFIED HYPERLIPIDEMIA TYPE: ICD-10-CM

## 2021-05-04 PROCEDURE — 96372 THER/PROPH/DIAG INJ SC/IM: CPT | Performed by: INTERNAL MEDICINE

## 2021-05-04 PROCEDURE — 99214 OFFICE O/P EST MOD 30 MIN: CPT | Performed by: INTERNAL MEDICINE

## 2021-05-04 PROCEDURE — G0439 PPPS, SUBSEQ VISIT: HCPCS | Performed by: INTERNAL MEDICINE

## 2021-05-04 RX ADMIN — CYANOCOBALAMIN 1000 MCG: 1000 INJECTION, SOLUTION INTRAMUSCULAR; SUBCUTANEOUS at 16:23

## 2021-05-04 NOTE — PATIENT INSTRUCTIONS

## 2021-05-04 NOTE — PROGRESS NOTES
The ABCs of the Annual Wellness Visit  Subsequent Medicare Wellness Visit    Chief Complaint   Patient presents with   • Medicare Wellness-subsequent       Subjective   History of Present Illness:  Brandy Han is a 89 y.o. female who presents for a Subsequent Medicare Wellness Visit.    The following data was reviewed by: Rupal Sutton MD on 05/04/2021:  Common labs    Common Labsle 8/24/20 10/26/20 10/26/20 10/26/20 4/21/21 4/21/21 4/21/21     1517 1517 1517 1545 1545 1545   Glucose   84   95    BUN   13   17    Creatinine   1.15 (A)   0.96    eGFR Non  Am   44 (A)   55 (A)    eGFR  Am   54 (A)   66    Sodium   139   137    Potassium 4.4  4.6   4.2    Chloride   103   101    Calcium   8.9   9.4    Total Protein   6.4   6.9    Albumin   3.80   4.00    Total Bilirubin   1.0   1.1    Alkaline Phosphatase   80   75    AST (SGOT)   14   18    ALT (SGPT)   9   12    WBC  7.77   5.54     Hemoglobin  14.5   13.5     Hematocrit  45.1   41.1     Platelets  170   176     Total Cholesterol    124   129   Triglycerides    120   77   HDL Cholesterol    66 (A)   73 (A)   LDL Cholesterol     37   41   (A) Abnormal value       Comments are available for some flowsheets but are not being displayed.           HLD.  Control is good.  Afib.  Maintained on Xarelto. Has pacemaker.    B12 and vitamin D are low.     Overactive bladder is a bother.  In past urologist gave her Myrbetriq samples.         HEALTH RISK ASSESSMENT    Recent Hospitalizations:  No hospitalization(s) within the last year.    Current Medical Providers:  Patient Care Team:  Rupal Sutton MD as PCP - General    Smoking Status:  Social History     Tobacco Use   Smoking Status Never Smoker   Smokeless Tobacco Never Used       Alcohol Consumption:  Social History     Substance and Sexual Activity   Alcohol Use Yes   • Alcohol/week: 1.0 standard drinks   • Types: 1 Shots of liquor per week       Depression Screen:   PHQ-2/PHQ-9 Depression Screening  5/4/2021   Little interest or pleasure in doing things 0   Feeling down, depressed, or hopeless 0   Trouble falling or staying asleep, or sleeping too much -   Feeling tired or having little energy -   Poor appetite or overeating -   Feeling bad about yourself - or that you are a failure or have let yourself or your family down -   Trouble concentrating on things, such as reading the newspaper or watching television -   Moving or speaking so slowly that other people could have noticed. Or the opposite - being so fidgety or restless that you have been moving around a lot more than usual -   Thoughts that you would be better off dead, or of hurting yourself in some way -   Total Score 0   If you checked off any problems, how difficult have these problems made it for you to do your work, take care of things at home, or get along with other people? -       Fall Risk Screen:  YEMI Fall Risk Assessment was completed, and patient is at HIGH risk for falls. Assessment completed on:5/4/2021    Health Habits and Functional and Cognitive Screening:  Functional & Cognitive Status 5/4/2021   Do you have difficulty preparing food and eating? No   Do you have difficulty bathing yourself, getting dressed or grooming yourself? No   Do you have difficulty using the toilet? No   Do you have difficulty moving around from place to place? No   Do you have trouble with steps or getting out of a bed or a chair? No   Current Diet Well Balanced Diet   Dental Exam Up to date   Eye Exam Up to date   Exercise (times per week) 0 times per week   Current Exercise Activities Include None   Do you need help using the phone?  No   Are you deaf or do you have serious difficulty hearing?  No   Do you need help with transportation? No   Do you need help shopping? No   Do you need help preparing meals?  No   Do you need help with housework?  No   Do you need help with laundry? No   Do you need help taking your medications? No   Do you need help  managing money? No   Do you ever drive or ride in a car without wearing a seat belt? No   Have you felt unusual stress, anger or loneliness in the last month? No   Who do you live with? Alone   If you need help, do you have trouble finding someone available to you? No   Have you been bothered in the last four weeks by sexual problems? No   Do you have difficulty concentrating, remembering or making decisions? No         Does the patient have evidence of cognitive impairment? No    Asprin use counseling:Does not need ASA (and currently is not on it)    Age-appropriate Screening Schedule:  Refer to the list below for future screening recommendations based on patient's age, sex and/or medical conditions. Orders for these recommended tests are listed in the plan section. The patient has been provided with a written plan.    Health Maintenance   Topic Date Due   • ZOSTER VACCINE (1 of 2) Never done   • INFLUENZA VACCINE  08/01/2021   • TDAP/TD VACCINES (2 - Td) 01/15/2022   • LIPID PANEL  04/21/2022   • MAMMOGRAM  Discontinued   • DXA SCAN  Discontinued          The following portions of the patient's history were reviewed and updated as appropriate: allergies, current medications, past family history, past medical history, past social history, past surgical history and problem list.    Outpatient Medications Prior to Visit   Medication Sig Dispense Refill   • acetaminophen (TYLENOL) 325 MG tablet Take 2 tablets by mouth Every 4 (Four) Hours As Needed for Mild Pain .     • atorvastatin (LIPITOR) 20 MG tablet TAKE 1 TABLET BY MOUTH DAILY 90 tablet 1   • bisacodyl (DULCOLAX) 5 MG EC tablet Take 1 tablet by mouth Daily As Needed for Constipation.     • digoxin (LANOXIN) 250 MCG tablet Take 250 mcg by mouth Daily.     • docusate sodium 100 MG capsule Take 100 mg by mouth 2 (Two) Times a Day.     • metoprolol succinate XL (TOPROL-XL) 50 MG 24 hr tablet Take 1 tablet by mouth Daily. 90 tablet 1   • Mirabegron ER (MYRBETRIQ) 25  "MG tablet sustained-release 24 hour 24 hr tablet Take 25 mg by mouth Daily.     • rivaroxaban (Xarelto) 15 MG tablet Take 1 tablet by mouth Daily. 30 tablet 5   • spironolactone (ALDACTONE) 25 MG tablet TAKE 1 TABLET BY MOUTH DAILY 90 tablet 3   • HYDROcodone-acetaminophen (NORCO) 5-325 MG per tablet Take 1 tablet by mouth Every 6 (Six) Hours As Needed for Moderate Pain . 50 tablet 0     Facility-Administered Medications Prior to Visit   Medication Dose Route Frequency Provider Last Rate Last Admin   • cyanocobalamin injection 1,000 mcg  1,000 mcg Intramuscular Q28 Days Rupal Sutton MD   1,000 mcg at 05/04/21 1623       Patient Active Problem List   Diagnosis   • History of venous thrombosis   • Bilateral carotid artery stenosis   • Osteoporosis   • Hyperlipidemia   • Right-sided low back pain with right-sided sciatica   • Sinus arrest   • Presence of cardiac pacemaker   • B12 deficiency   • Chronic pain of both knees   • Non-traumatic rhabdomyolysis   • Closed traumatic nondisplaced fracture of sixth cervical vertebra (CMS/HCC)   • Closed nondisplaced fracture of left lateral portion of seventh cervical vertebra (CMS/HCC)   • Vitamin D deficiency   • Atrial fibrillation (CMS/HCC)   • Overactive bladder       Advanced Care Planning:  ACP discussion was held with the patient during this visit. Patient does not have an advance directive, information provided.    Review of Systems    Compared to one year ago, the patient feels her physical health is the same.  Compared to one year ago, the patient feels her mental health is the same.    Reviewed chart for potential of high risk medication in the elderly: yes  Reviewed chart for potential of harmful drug interactions in the elderly:yes    Objective         Vitals:    05/04/21 1541   BP: 122/64   Pulse: 71   SpO2: 98%   Weight: 73 kg (161 lb)   Height: 171.5 cm (67.52\")   PainSc: 0-No pain       Body mass index is 24.83 kg/m².  Discussed the patient's BMI with her. " The BMI is in the acceptable range.    Physical Exam    Lab Results   Component Value Date    GLU 95 04/21/2021    CHLPL 129 04/21/2021    TRIG 77 04/21/2021    HDL 73 (H) 04/21/2021    LDL 41 04/21/2021    VLDL 15 04/21/2021        Assessment/Plan   Medicare Risks and Personalized Health Plan  CMS Preventative Services Quick Reference  Immunizations Discussed/Encouraged (specific immunizations; Shingrix and COVID19 )    The above risks/problems have been discussed with the patient.  Pertinent information has been shared with the patient in the After Visit Summary.  Follow up plans and orders are seen below in the Assessment/Plan Section.    Diagnoses and all orders for this visit:    1. Medicare annual wellness visit, subsequent (Primary)    2. Hyperlipidemia, unspecified hyperlipidemia type    3. Atrial fibrillation, unspecified type (CMS/[facility])    4. B12 deficiency    5. Vitamin D deficiency    6. Overactive bladder    HLD.  The patient is advised to continue current dosage of atorvastatin.   Afib.  The patient is advised to continue current dosage of Xarelto.  B12 def.  Shot today.  I recommend 1000mcg daily.   Vitamin D def.  I recommend 1000 IUs daily.    Overactive bladder.  Samples of myrbetriq 25mg given today.  Bladder handout.          Follow Up:  Return in about 1 year (around 5/4/2022) for Medicare Wellness.     An After Visit Summary and PPPS were given to the patient.

## 2021-05-11 RX ORDER — METOPROLOL SUCCINATE 50 MG/1
50 TABLET, EXTENDED RELEASE ORAL DAILY
Qty: 90 TABLET | Refills: 1 | Status: SHIPPED | OUTPATIENT
Start: 2021-05-11 | End: 2021-11-23

## 2021-06-07 RX ORDER — ATORVASTATIN CALCIUM 20 MG/1
20 TABLET, FILM COATED ORAL DAILY
Qty: 90 TABLET | Refills: 1 | Status: SHIPPED | OUTPATIENT
Start: 2021-06-07 | End: 2021-12-10

## 2021-07-23 NOTE — TELEPHONE ENCOUNTER
Caller: Brandy Han    Relationship: Self    Best call back number:[phone]     Medication needed:   Requested Prescriptions     Pending Prescriptions Disp Refills   • rivaroxaban (Xarelto) 15 MG tablet 30 tablet 5     Sig: Take 1 tablet by mouth Daily.       When do you need the refill by: 7-23-21    What additional details did the patient provide when requesting the medication: PATIENT IS OUT OF THE MEDICATION    Does the patient have less than a 3 day supply:  [x] Yes  [] No    What is the patient's preferred pharmacy: [id number] [address] AT [facility] - [phone] [facility] [phone] FX

## 2021-08-23 RX ORDER — RIVAROXABAN 15 MG/1
TABLET, FILM COATED ORAL
Qty: 30 TABLET | Refills: 0 | Status: SHIPPED | OUTPATIENT
Start: 2021-08-23 | End: 2021-09-20

## 2021-09-20 RX ORDER — RIVAROXABAN 15 MG/1
TABLET, FILM COATED ORAL
Qty: 30 TABLET | Refills: 0 | Status: SHIPPED | OUTPATIENT
Start: 2021-09-20 | End: 2021-10-27

## 2021-10-27 RX ORDER — RIVAROXABAN 15 MG/1
TABLET, FILM COATED ORAL
Qty: 90 TABLET | Refills: 0 | Status: SHIPPED | OUTPATIENT
Start: 2021-10-27 | End: 2022-03-03

## 2021-11-23 RX ORDER — METOPROLOL SUCCINATE 50 MG/1
50 TABLET, EXTENDED RELEASE ORAL DAILY
Qty: 90 TABLET | Refills: 1 | Status: SHIPPED | OUTPATIENT
Start: 2021-11-23 | End: 2022-05-31

## 2021-12-10 RX ORDER — ATORVASTATIN CALCIUM 20 MG/1
20 TABLET, FILM COATED ORAL DAILY
Qty: 90 TABLET | Refills: 1 | Status: SHIPPED | OUTPATIENT
Start: 2021-12-10 | End: 2022-06-13

## 2022-03-03 RX ORDER — RIVAROXABAN 15 MG/1
TABLET, FILM COATED ORAL
Qty: 90 TABLET | Refills: 0 | Status: SHIPPED | OUTPATIENT
Start: 2022-03-03 | End: 2022-06-17

## 2022-05-31 RX ORDER — METOPROLOL SUCCINATE 50 MG/1
50 TABLET, EXTENDED RELEASE ORAL DAILY
Qty: 90 TABLET | Refills: 1 | Status: SHIPPED | OUTPATIENT
Start: 2022-05-31 | End: 2022-09-16 | Stop reason: HOSPADM

## 2022-06-13 RX ORDER — ATORVASTATIN CALCIUM 20 MG/1
20 TABLET, FILM COATED ORAL DAILY
Qty: 90 TABLET | Refills: 1 | Status: SHIPPED | OUTPATIENT
Start: 2022-06-13 | End: 2023-01-30

## 2022-06-17 RX ORDER — RIVAROXABAN 15 MG/1
TABLET, FILM COATED ORAL
Qty: 30 TABLET | Refills: 0 | Status: SHIPPED | OUTPATIENT
Start: 2022-06-17 | End: 2022-09-16 | Stop reason: HOSPADM

## 2022-09-07 ENCOUNTER — HOSPITAL ENCOUNTER (INPATIENT)
Facility: HOSPITAL | Age: 87
LOS: 8 days | Discharge: NURSING, REHAB OR LONG-TERM CARE | End: 2022-09-15
Attending: EMERGENCY MEDICINE | Admitting: HOSPITALIST

## 2022-09-07 ENCOUNTER — APPOINTMENT (OUTPATIENT)
Dept: GENERAL RADIOLOGY | Facility: HOSPITAL | Age: 87
End: 2022-09-07

## 2022-09-07 ENCOUNTER — APPOINTMENT (OUTPATIENT)
Dept: CT IMAGING | Facility: HOSPITAL | Age: 87
End: 2022-09-07

## 2022-09-07 DIAGNOSIS — I48.91 ATRIAL FIBRILLATION WITH RVR: ICD-10-CM

## 2022-09-07 DIAGNOSIS — S72.142A CLOSED COMMINUTED INTERTROCHANTERIC FRACTURE OF LEFT FEMUR, INITIAL ENCOUNTER: Primary | ICD-10-CM

## 2022-09-07 PROBLEM — M80.00XA OSTEOPOROSIS WITH PATHOLOGICAL FRACTURE: Status: ACTIVE | Noted: 2022-09-07

## 2022-09-07 PROBLEM — I50.32 CHRONIC DIASTOLIC CONGESTIVE HEART FAILURE (HCC): Status: ACTIVE | Noted: 2022-09-07

## 2022-09-07 LAB
ALBUMIN SERPL-MCNC: 3.4 G/DL (ref 3.5–5.2)
ALBUMIN/GLOB SERPL: 1.3 G/DL
ALP SERPL-CCNC: 69 U/L (ref 39–117)
ALT SERPL W P-5'-P-CCNC: 8 U/L (ref 1–33)
ANION GAP SERPL CALCULATED.3IONS-SCNC: 12 MMOL/L (ref 5–15)
AST SERPL-CCNC: 24 U/L (ref 1–32)
BASOPHILS # BLD AUTO: 0.02 10*3/MM3 (ref 0–0.2)
BASOPHILS NFR BLD AUTO: 0.2 % (ref 0–1.5)
BILIRUB SERPL-MCNC: 1.7 MG/DL (ref 0–1.2)
BUN SERPL-MCNC: 22 MG/DL (ref 8–23)
BUN/CREAT SERPL: 25.3 (ref 7–25)
CALCIUM SPEC-SCNC: 8.7 MG/DL (ref 8.2–9.6)
CHLORIDE SERPL-SCNC: 102 MMOL/L (ref 98–107)
CK SERPL-CCNC: 581 U/L (ref 20–180)
CO2 SERPL-SCNC: 23 MMOL/L (ref 22–29)
CREAT SERPL-MCNC: 0.87 MG/DL (ref 0.57–1)
DEPRECATED RDW RBC AUTO: 41 FL (ref 37–54)
EGFRCR SERPLBLD CKD-EPI 2021: 62.6 ML/MIN/1.73
EOSINOPHIL # BLD AUTO: 0.01 10*3/MM3 (ref 0–0.4)
EOSINOPHIL NFR BLD AUTO: 0.1 % (ref 0.3–6.2)
ERYTHROCYTE [DISTWIDTH] IN BLOOD BY AUTOMATED COUNT: 12.2 % (ref 12.3–15.4)
GLOBULIN UR ELPH-MCNC: 2.7 GM/DL
GLUCOSE SERPL-MCNC: 110 MG/DL (ref 65–99)
HCT VFR BLD AUTO: 35.4 % (ref 34–46.6)
HGB BLD-MCNC: 11.9 G/DL (ref 12–15.9)
IMM GRANULOCYTES # BLD AUTO: 0.04 10*3/MM3 (ref 0–0.05)
IMM GRANULOCYTES NFR BLD AUTO: 0.4 % (ref 0–0.5)
LYMPHOCYTES # BLD AUTO: 1.34 10*3/MM3 (ref 0.7–3.1)
LYMPHOCYTES NFR BLD AUTO: 13.9 % (ref 19.6–45.3)
MCH RBC QN AUTO: 30.9 PG (ref 26.6–33)
MCHC RBC AUTO-ENTMCNC: 33.6 G/DL (ref 31.5–35.7)
MCV RBC AUTO: 91.9 FL (ref 79–97)
MONOCYTES # BLD AUTO: 1.1 10*3/MM3 (ref 0.1–0.9)
MONOCYTES NFR BLD AUTO: 11.4 % (ref 5–12)
NEUTROPHILS NFR BLD AUTO: 7.13 10*3/MM3 (ref 1.7–7)
NEUTROPHILS NFR BLD AUTO: 74 % (ref 42.7–76)
NRBC BLD AUTO-RTO: 0 /100 WBC (ref 0–0.2)
PLATELET # BLD AUTO: 153 10*3/MM3 (ref 140–450)
PMV BLD AUTO: 10.3 FL (ref 6–12)
POTASSIUM SERPL-SCNC: 4.1 MMOL/L (ref 3.5–5.2)
PROT SERPL-MCNC: 6.1 G/DL (ref 6–8.5)
QT INTERVAL: 319 MS
RBC # BLD AUTO: 3.85 10*6/MM3 (ref 3.77–5.28)
SODIUM SERPL-SCNC: 137 MMOL/L (ref 136–145)
TROPONIN T SERPL-MCNC: <0.01 NG/ML (ref 0–0.03)
WBC NRBC COR # BLD: 9.64 10*3/MM3 (ref 3.4–10.8)

## 2022-09-07 PROCEDURE — 99285 EMERGENCY DEPT VISIT HI MDM: CPT

## 2022-09-07 PROCEDURE — 85025 COMPLETE CBC W/AUTO DIFF WBC: CPT | Performed by: EMERGENCY MEDICINE

## 2022-09-07 PROCEDURE — 82550 ASSAY OF CK (CPK): CPT | Performed by: EMERGENCY MEDICINE

## 2022-09-07 PROCEDURE — 70450 CT HEAD/BRAIN W/O DYE: CPT

## 2022-09-07 PROCEDURE — 73502 X-RAY EXAM HIP UNI 2-3 VIEWS: CPT

## 2022-09-07 PROCEDURE — 84484 ASSAY OF TROPONIN QUANT: CPT | Performed by: EMERGENCY MEDICINE

## 2022-09-07 PROCEDURE — 71045 X-RAY EXAM CHEST 1 VIEW: CPT

## 2022-09-07 PROCEDURE — 36415 COLL VENOUS BLD VENIPUNCTURE: CPT

## 2022-09-07 PROCEDURE — 25010000002 MORPHINE PER 10 MG: Performed by: EMERGENCY MEDICINE

## 2022-09-07 PROCEDURE — 80053 COMPREHEN METABOLIC PANEL: CPT | Performed by: EMERGENCY MEDICINE

## 2022-09-07 PROCEDURE — 93005 ELECTROCARDIOGRAM TRACING: CPT | Performed by: EMERGENCY MEDICINE

## 2022-09-07 PROCEDURE — 72125 CT NECK SPINE W/O DYE: CPT

## 2022-09-07 PROCEDURE — 93010 ELECTROCARDIOGRAM REPORT: CPT | Performed by: INTERNAL MEDICINE

## 2022-09-07 RX ORDER — ONDANSETRON 2 MG/ML
4 INJECTION INTRAMUSCULAR; INTRAVENOUS EVERY 6 HOURS PRN
Status: DISCONTINUED | OUTPATIENT
Start: 2022-09-07 | End: 2022-09-09

## 2022-09-07 RX ORDER — SODIUM CHLORIDE 9 MG/ML
75 INJECTION, SOLUTION INTRAVENOUS CONTINUOUS
Status: DISCONTINUED | OUTPATIENT
Start: 2022-09-07 | End: 2022-09-08

## 2022-09-07 RX ORDER — MORPHINE SULFATE 2 MG/ML
4 INJECTION, SOLUTION INTRAMUSCULAR; INTRAVENOUS ONCE
Status: COMPLETED | OUTPATIENT
Start: 2022-09-07 | End: 2022-09-07

## 2022-09-07 RX ORDER — ACETAMINOPHEN 325 MG/1
650 TABLET ORAL EVERY 4 HOURS PRN
Status: DISCONTINUED | OUTPATIENT
Start: 2022-09-07 | End: 2022-09-16 | Stop reason: HOSPADM

## 2022-09-07 RX ORDER — HYDROCODONE BITARTRATE AND ACETAMINOPHEN 5; 325 MG/1; MG/1
1 TABLET ORAL EVERY 4 HOURS PRN
Status: DISCONTINUED | OUTPATIENT
Start: 2022-09-07 | End: 2022-09-16 | Stop reason: HOSPADM

## 2022-09-07 RX ORDER — MORPHINE SULFATE 2 MG/ML
2 INJECTION, SOLUTION INTRAMUSCULAR; INTRAVENOUS
Status: DISCONTINUED | OUTPATIENT
Start: 2022-09-07 | End: 2022-09-16 | Stop reason: HOSPADM

## 2022-09-07 RX ORDER — ONDANSETRON 4 MG/1
4 TABLET, FILM COATED ORAL EVERY 6 HOURS PRN
Status: DISCONTINUED | OUTPATIENT
Start: 2022-09-07 | End: 2022-09-09

## 2022-09-07 RX ADMIN — SODIUM CHLORIDE 1000 ML: 9 INJECTION, SOLUTION INTRAVENOUS at 14:12

## 2022-09-07 RX ADMIN — SODIUM CHLORIDE 75 ML/HR: 9 INJECTION, SOLUTION INTRAVENOUS at 19:00

## 2022-09-07 RX ADMIN — METOPROLOL TARTRATE 5 MG: 1 INJECTION, SOLUTION INTRAVENOUS at 14:18

## 2022-09-07 RX ADMIN — MORPHINE SULFATE 4 MG: 2 INJECTION, SOLUTION INTRAMUSCULAR; INTRAVENOUS at 14:42

## 2022-09-07 NOTE — ED PROVIDER NOTES
EMERGENCY DEPARTMENT ENCOUNTER    Room Number:  38/38  PCP: Rupal Sutton MD  Historian: Patient  History Limited By: Nothing      HPI  Chief Complaint: Fall  Context: Brandy Han is a 89 y.o. female who presents to the ED c/o fall.  Patient's family states they last saw her Monday and she was doing well.  Patient thinks she fell on Tuesday.  Has been on the floor since then.  Patient complaining mostly of left hip pain.  Patient has not taken any of her medications.  Patient does have some neck pain.  No fevers or chills. Thinks she may have passed out.      Location: Left hip  Radiation: N/A  Character: Aching  Duration: Tuesday  Severity: Moderate  Progression: Not improving  Aggravating Factors: Nothing  Alleviating Factors: Nothing        MEDICAL RECORD REVIEW    Atrial fibrillation          PAST MEDICAL HISTORY  Active Ambulatory Problems     Diagnosis Date Noted   • History of venous thrombosis 06/14/2016   • Bilateral carotid artery stenosis 06/14/2016   • Osteoporosis 06/14/2016   • Hyperlipidemia 06/14/2016   • Right-sided low back pain with right-sided sciatica 07/15/2016   • Sinus arrest 10/04/2016   • Presence of cardiac pacemaker 10/04/2016   • B12 deficiency 11/04/2016   • Chronic pain of both knees 04/25/2017   • Non-traumatic rhabdomyolysis 08/21/2020   • Closed traumatic nondisplaced fracture of sixth cervical vertebra ([facility]) 08/21/2020   • Closed nondisplaced fracture of left lateral portion of seventh cervical vertebra ([facility]) 08/23/2020   • Vitamin D deficiency 05/04/2021   • Atrial fibrillation ([facility])    • Overactive bladder 05/04/2021     Resolved Ambulatory Problems     Diagnosis Date Noted   • Pain 04/14/2016   • Knee pain, acute 05/24/2016   • Depression 06/14/2016   • Paroxysmal atrial fibrillation (HCC) 06/14/2016   • Right shoulder pain 07/15/2016   • H/O fall 07/15/2016   • Lumbar spine pain 07/15/2016   • Glenohumeral arthritis 07/15/2016   • 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   • Arthritis 01/20/2017   • Primary localized osteoarthrosis of lower leg 11/27/2017   • Supratherapeutic INR 08/22/2020     Past Medical History:   Diagnosis Date   • Anxiety    • Arterial thrombosis (CMS/HCC)    • Headache    • Heart palpitations    • Migraine    • Syncope    • UTI (urinary tract infection)          PAST SURGICAL HISTORY  Past Surgical History:   Procedure Laterality Date   • ANKLE SURGERY     • CARDIAC PACEMAKER PLACEMENT     • OTHER SURGICAL HISTORY Left     elbow surgery   • PACEMAKER IMPLANTATION           FAMILY HISTORY  Family History   Problem Relation Age of Onset   • Heart disease Mother    • Lung cancer Father    • Stroke Daughter         cerebral accident         SOCIAL HISTORY  Social History     Socioeconomic History   • Marital status:    Tobacco Use   • Smoking status: Never Smoker   • Smokeless tobacco: Never Used   Substance and Sexual Activity   • Alcohol use: Yes     Alcohol/week: 1.0 standard drink     Types: 1 Shots of liquor per week   • Drug use: No   • Sexual activity: Defer         ALLERGIES  Patient has no known allergies.        REVIEW OF SYSTEMS  Review of Systems   Constitutional: Negative for fever.   HENT: Negative for sore throat.    Eyes: Negative.    Respiratory: Negative for cough and shortness of breath.    Cardiovascular: Positive for palpitations. Negative for chest pain.   Gastrointestinal: Negative for abdominal pain, diarrhea and vomiting.   Genitourinary: Negative for dysuria.   Musculoskeletal: Positive for arthralgias. Negative for neck pain.   Skin: Negative for rash.   Allergic/Immunologic: Negative.    Neurological: Positive for weakness. Negative for numbness and headaches.   Hematological: Negative.    Psychiatric/Behavioral: Negative.    All other systems reviewed and are negative.           PHYSICAL EXAM  ED Triage Vitals [09/07/22 1341]   Temp Heart Rate Resp BP SpO2   97.3 °F (36.3 °C) 120 20 154/88 96 %       Temp src Heart Rate Source Patient Position BP Location FiO2 (%)   -- -- -- -- --       Physical Exam  Vitals and nursing note reviewed.   Constitutional:       General: She is not in acute distress.  HENT:      Head: Normocephalic and atraumatic.   Eyes:      Pupils: Pupils are equal, round, and reactive to light.   Neck:      Comments: In c-collar  Cardiovascular:      Rate and Rhythm: Tachycardia present. Rhythm irregular.      Heart sounds: Normal heart sounds.   Pulmonary:      Effort: Pulmonary effort is normal. No respiratory distress.      Breath sounds: Normal breath sounds.   Abdominal:      Palpations: Abdomen is soft.      Tenderness: There is no abdominal tenderness. There is no guarding or rebound.   Musculoskeletal:         General: Normal range of motion.      Cervical back: Tenderness present.      Comments: Pain to left hip with shortening and external rotation   Skin:     General: Skin is warm and dry.      Findings: No rash.   Neurological:      Mental Status: She is alert and oriented to person, place, and time.      Sensory: Sensation is intact. No sensory deficit.      Motor: No weakness.   Psychiatric:         Mood and Affect: Mood and affect normal.       Patient was wearing a face mask when I entered the room and they continued to wear a mask throughout their stay in the ED.  I wore PPE, including  gloves, face mask with shield or face mask with goggles whenever I was in the room with patient.       LAB RESULTS  Recent Results (from the past 24 hour(s))   Comprehensive Metabolic Panel    Collection Time: 09/07/22  2:14 PM    Specimen: Blood   Result Value Ref Range    Glucose 110 (H) 65 - 99 mg/dL    BUN 22 8 - 23 mg/dL    Creatinine 0.87 0.57 - 1.00 mg/dL    Sodium 137 136 - 145 mmol/L    Potassium 4.1 3.5 - 5.2 mmol/L    Chloride 102 98 - 107 mmol/L    CO2 23.0 22.0 - 29.0 mmol/L    Calcium 8.7 8.2 - 9.6 mg/dL    Total Protein 6.1 6.0 - 8.5 g/dL    Albumin 3.40 (L) 3.50 - 5.20 g/dL     ALT (SGPT) 8 1 - 33 U/L    AST (SGOT) 24 1 - 32 U/L    Alkaline Phosphatase 69 39 - 117 U/L    Total Bilirubin 1.7 (H) 0.0 - 1.2 mg/dL    Globulin 2.7 gm/dL    A/G Ratio 1.3 g/dL    BUN/Creatinine Ratio 25.3 (H) 7.0 - 25.0    Anion Gap 12.0 5.0 - 15.0 mmol/L    eGFR 62.6 >60.0 mL/min/1.73   Troponin    Collection Time: 09/07/22  2:14 PM    Specimen: Blood   Result Value Ref Range    Troponin T <0.010 0.000 - 0.030 ng/mL   CK    Collection Time: 09/07/22  2:14 PM    Specimen: Blood   Result Value Ref Range    Creatine Kinase 581 (H) 20 - 180 U/L   CBC Auto Differential    Collection Time: 09/07/22  2:14 PM    Specimen: Blood   Result Value Ref Range    WBC 9.64 3.40 - 10.80 10*3/mm3    RBC 3.85 3.77 - 5.28 10*6/mm3    Hemoglobin 11.9 (L) 12.0 - 15.9 g/dL    Hematocrit 35.4 34.0 - 46.6 %    MCV 91.9 79.0 - 97.0 fL    MCH 30.9 26.6 - 33.0 pg    MCHC 33.6 31.5 - 35.7 g/dL    RDW 12.2 (L) 12.3 - 15.4 %    RDW-SD 41.0 37.0 - 54.0 fl    MPV 10.3 6.0 - 12.0 fL    Platelets 153 140 - 450 10*3/mm3    Neutrophil % 74.0 42.7 - 76.0 %    Lymphocyte % 13.9 (L) 19.6 - 45.3 %    Monocyte % 11.4 5.0 - 12.0 %    Eosinophil % 0.1 (L) 0.3 - 6.2 %    Basophil % 0.2 0.0 - 1.5 %    Immature Grans % 0.4 0.0 - 0.5 %    Neutrophils, Absolute 7.13 (H) 1.70 - 7.00 10*3/mm3    Lymphocytes, Absolute 1.34 0.70 - 3.10 10*3/mm3    Monocytes, Absolute 1.10 (H) 0.10 - 0.90 10*3/mm3    Eosinophils, Absolute 0.01 0.00 - 0.40 10*3/mm3    Basophils, Absolute 0.02 0.00 - 0.20 10*3/mm3    Immature Grans, Absolute 0.04 0.00 - 0.05 10*3/mm3    nRBC 0.0 0.0 - 0.2 /100 WBC   ECG 12 Lead    Collection Time: 09/07/22  2:16 PM   Result Value Ref Range    QT Interval 319 ms       Ordered the above labs and reviewed the results.        RADIOLOGY  CT Head Without Contrast   Preliminary Result       No evidence for acute traumatic intracranial pathology.       Mild changes of chronic small vessel ischemic phenomena. Findings   compatible with a chronic infarct within  the inferior aspect left   occipital lobe within the left PCA distribution.       TECHNIQUE: CT scan of the cervical spine was obtained with 1 mm axial   bone algorithm and 2 mm axial soft tissue algorithm images. Sagittal and   coronal reconstructed images were obtained.       FINDINGS:       There are stable mild degrees of vertebral body height loss seen at the   C7, T1, T2, and T3 levels. These findings are unchanged when compared to   prior CT scan of the cervical spine dated 08/21/2020. There is no   convincing evidence to suggest acute fracture or bony malalignment   involving the cervical spine.       There are mild degrees of canal narrowing noted at the C2-C3, C3-C4,   C4-C5, C5-C6, and C6-C7 levels secondary to disc osteophyte complexes.   Relatively mild degrees of multilevel foraminal narrowing are also   appreciated from C3-C4 down to C6-C7.       IMPRESSION:       There is no convincing evidence to suggest acute fracture or bony   malalignment involving the cervical spine. There is mild   chronic-appearing vertebral body height loss from T7 down to T3 that is   unchanged in appearance when compared to the prior CT scan of the   cervical spine dated 08/21/2020.       Radiation dose reduction techniques were utilized, including automated   exposure control and exposure modulation based on body size.              CT Cervical Spine Without Contrast   Preliminary Result       No evidence for acute traumatic intracranial pathology.       Mild changes of chronic small vessel ischemic phenomena. 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       TECHNIQUE: CT scan of the cervical spine was obtained with 1 mm axial   bone algorithm and 2 mm axial soft tissue algorithm images. Sagittal and   coronal reconstructed images were obtained.       FINDINGS:       There are stable mild degrees of vertebral body height loss seen at the    C7, T1, T2, and T3 levels. These findings are unchanged when compared to   prior CT scan of the cervical spine dated 08/21/2020. There is no   convincing evidence to suggest acute fracture or bony malalignment   involving the cervical spine.       There are mild degrees of canal narrowing noted at the C2-C3, C3-C4,   C4-C5, C5-C6, and C6-C7 levels secondary to disc osteophyte complexes.   Relatively mild degrees of multilevel foraminal narrowing are also   appreciated from C3-C4 down to C6-C7.       IMPRESSION:       There is no convincing evidence to suggest acute fracture or bony   malalignment involving the cervical spine. There is mild   chronic-appearing vertebral body height loss from T7 down to T3 that is   unchanged in appearance when compared to the prior CT scan of the   cervical spine dated 08/21/2020.       Radiation dose reduction techniques were utilized, including automated   exposure control and exposure modulation based on body size.              XR Chest 1 View   Final Result   No focal pulmonary consolidation. Borderline heart size. A   right midlung nodule, as described.       This report was finalized on 9/7/2022 3:12 PM by Dr. Dudley Lau M.D.          XR Hip With or Without Pelvis 2 - 3 View Left   Final Result       Left intertrochanteric fracture.       This report was finalized on 9/7/2022 3:15 PM by Dr. Dudley Lau M.D.               Ordered the above noted radiological studies. Reviewed by me in PACS.          PROCEDURES  Procedures      EKG:          EKG time: 1416  Rhythm/Rate: Atrial fibrillation rate 131  P waves and OH: No P waves  QRS, axis: Normal QRS, LVH  ST and T waves: Nonspecific ST-T wave    Interpreted Contemporaneously by me, independently viewed  Unchanged compared to prior 8/21/2020        MEDICATIONS GIVEN IN ER  Medications   sodium chloride 0.9 % bolus 1,000 mL (1,000 mL Intravenous New Bag 9/7/22 1412)   metoprolol tartrate (LOPRESSOR) injection 5  mg (5 mg Intravenous Given 9/7/22 1418)   morphine injection 4 mg (4 mg Intravenous Given 9/7/22 1442)             PROGRESS AND CONSULTS  ED Course as of 09/07/22 1607   Wed Sep 07, 2022   1604 16:04 EDT  Patient presents for fall with hip fracture.  Patient has been down the ground for a while however CPK is not markedly elevated.  Patient does have left-sided intertrochanteric fracture.  Patient's A. fib was rapid however given 1 dose of metoprolol with improvement.  Patient discussed with Dr. Rasmussen who will admit.  Discussed with office Dr. Powell to consult. [SL]      ED Course User Index  [SL] Lorne Bella MD           MEDICAL DECISION MAKING      MDM  Number of Diagnoses or Management Options     Amount and/or Complexity of Data Reviewed  Clinical lab tests: reviewed and ordered (White blood cell count 9.6)  Tests in the radiology section of CPT®: reviewed and ordered (CT head and C-spine negative, left hip with intertrochanteric fracture)  Discuss the patient with other providers: yes (Discussed with Dr. Rasmussen and discussed with office of Dr. Powell)               DIAGNOSIS  Final diagnoses:   Closed comminuted intertrochanteric fracture of left femur, initial encounter ([facility])   Atrial fibrillation with RVR ([facility])           DISPOSITION  admit        Latest Documented Vital Signs:  As of 16:07 EDT  BP- 141/70 HR- 102 Temp- 97.3 °F (36.3 °C) O2 sat- 95%                           Lorne Bella MD  09/07/22 8020

## 2022-09-07 NOTE — PLAN OF CARE
Goal Outcome Evaluation:  Plan of Care Reviewed With: patient, daughter        Progress: no change     A&Ox4. RA. Afib on monitor. Purewick in place. Strict bed rest. NS at 75mL. Daughter, Lisa, at bedside. Daughter to take home patient's belongings, medications and purse. Awaiting consult with ortho. NPO at midnight for possible surgery.

## 2022-09-07 NOTE — H&P
Patient Name:  Brandy Han  YOB: 1930  MRN:  [id number]  Admit Date:  9/7/2022  Patient Care Team:  Rupal Sutton MD as PCP - General      Subjective   History Present Illness     Chief Complaint   Patient presents with   • Fall   • Hip Injury   • Head Injury       Ms. Han is a 89 y.o. non-smoker with a history of atrial fibrillation who is admitted with left hip pain. Onset of symptoms was yesterday after she sustained a fall in her home while she states getting out of her chair to adjust something on the TV.  Patient does not seem clear in regards to what happened.  Daughter at bedside states she was okay Monday evening when she visited.  The pain is described as an aching pain that is severe in intensity. It is made worse by movement and palpation. Relieved with rest and pain medication. She has had no prior hip problems. She currently denies any chest pain, palpitations, SOA or signs/symptoms of decompensated CHF. Prior cardiac history includes atrial fibrillation.  She sees Dr. Radha Louise.      History of Present Illness  Review of Systems   Constitutional: Negative.    HENT: Negative.    Respiratory: Negative.    Cardiovascular: Negative.    Gastrointestinal: Negative.    Endocrine: Negative.    Genitourinary: Negative.    Musculoskeletal: Positive for arthralgias.   Skin: Negative.    Neurological: Negative.    Hematological: Negative.    Psychiatric/Behavioral: Negative.         Personal History     Past Medical History:   Diagnosis Date   • Anxiety    • Arterial thrombosis (CMS/HCC)    • Arthritis    • Atrial fibrillation (CMS/HCC)    • Headache    • Heart palpitations    • Migraine    • Syncope    • UTI (urinary tract infection)      Past Surgical History:   Procedure Laterality Date   • ANKLE SURGERY     • CARDIAC PACEMAKER PLACEMENT     • OTHER SURGICAL HISTORY Left     elbow surgery   • PACEMAKER IMPLANTATION       Family History   Problem Relation Age of Onset   • Heart  disease Mother    • Lung cancer Father    • Stroke Daughter         cerebral accident     Social History     Tobacco Use   • Smoking status: Never Smoker   • Smokeless tobacco: Never Used   Substance Use Topics   • Alcohol use: Yes     Alcohol/week: 1.0 standard drink     Types: 1 Shots of liquor per week   • Drug use: No     Current Facility-Administered Medications on File Prior to Encounter   Medication Dose Route Frequency Provider Last Rate Last Admin   • cyanocobalamin injection 1,000 mcg  1,000 mcg Intramuscular Q28 Days Rupal Sutton MD   1,000 mcg at 05/04/21 1623     Current Outpatient Medications on File Prior to Encounter   Medication Sig Dispense Refill   • acetaminophen (TYLENOL) 325 MG tablet Take 2 tablets by mouth Every 4 (Four) Hours As Needed for Mild Pain .     • atorvastatin (LIPITOR) 20 MG tablet TAKE 1 TABLET BY MOUTH DAILY 90 tablet 1   • bisacodyl (DULCOLAX) 5 MG EC tablet Take 1 tablet by mouth Daily As Needed for Constipation.     • digoxin (LANOXIN) 250 MCG tablet Take 250 mcg by mouth Daily.     • docusate sodium 100 MG capsule Take 100 mg by mouth 2 (Two) Times a Day.     • metoprolol succinate XL (TOPROL-XL) 50 MG 24 hr tablet TAKE 1 TABLET BY MOUTH DAILY 90 tablet 1   • Mirabegron ER (MYRBETRIQ) 25 MG tablet sustained-release 24 hour 24 hr tablet Take 25 mg by mouth Daily.     • spironolactone (ALDACTONE) 25 MG tablet TAKE 1 TABLET BY MOUTH DAILY 90 tablet 3   • Xarelto 15 MG tablet TAKE 1 TABLET BY MOUTH DAILY 30 tablet 0     No Known Allergies    Objective    Objective     Vital Signs  Temp:  [97.3 °F (36.3 °C)] 97.3 °F (36.3 °C)  Heart Rate:  [] 108  Resp:  [20] 20  BP: ()/() 142/86  SpO2:  [95 %-97 %] 95 %  on  Flow (L/min):  [2] 2;   Device (Oxygen Therapy): nasal cannula  There is no height or weight on file to calculate BMI.    Physical Exam  Vitals and nursing note reviewed.   Constitutional:       General: She is not in acute distress.     Appearance:  Normal appearance.   HENT:      Head: Normocephalic and atraumatic.   Eyes:      General: No scleral icterus.     Conjunctiva/sclera: Conjunctivae normal.   Neck:      Vascular: No JVD.   Cardiovascular:      Rate and Rhythm: Tachycardia present. Rhythm irregularly irregular.      Heart sounds: Normal heart sounds.   Pulmonary:      Effort: Pulmonary effort is normal.      Breath sounds: Normal breath sounds.   Abdominal:      General: Bowel sounds are normal. There is no distension.      Palpations: Abdomen is soft.      Tenderness: There is no abdominal tenderness.   Musculoskeletal:         General: Tenderness and signs of injury present.      Cervical back: Normal range of motion.   Skin:     General: Skin is warm and dry.   Neurological:      Mental Status: She is alert and oriented to person, place, and time.   Psychiatric:         Behavior: Behavior normal.       Results Review:  I reviewed the patient's new clinical results.  I reviewed the patient's new imaging results and agree with the interpretation.  I reviewed the patient's other test results and agree with the interpretation  I personally viewed and interpreted the patient's EKG/Telemetry data  Discussed with ED provider.    Lab Results (last 24 hours)     Procedure Component Value Units Date/Time    CBC & Differential [[id number]]  (Abnormal) Collected: 09/07/22 1414    Specimen: Blood Updated: 09/07/22 1425    Narrative:      The following orders were created for panel order CBC & Differential.  Procedure                               Abnormality         Status                     ---------                               -----------         ------                     CBC Auto Differential[[id number]]        Abnormal            Final result                 Please view results for these tests on the individual orders.    Comprehensive Metabolic Panel [[id number]]  (Abnormal) Collected: 09/07/22 1414    Specimen: Blood Updated: 09/07/22 1446     Glucose  110 mg/dL      BUN 22 mg/dL      Creatinine 0.87 mg/dL      Sodium 137 mmol/L      Potassium 4.1 mmol/L      Chloride 102 mmol/L      CO2 23.0 mmol/L      Calcium 8.7 mg/dL      Total Protein 6.1 g/dL      Albumin 3.40 g/dL      ALT (SGPT) 8 U/L      AST (SGOT) 24 U/L      Alkaline Phosphatase 69 U/L      Total Bilirubin 1.7 mg/dL      Globulin 2.7 gm/dL      A/G Ratio 1.3 g/dL      BUN/Creatinine Ratio 25.3     Anion Gap 12.0 mmol/L      eGFR 62.6 mL/min/1.73      Comment: National Kidney Foundation and American Society of Nephrology (ASN) Task Force recommended calculation based on the Chronic Kidney Disease Epidemiology Collaboration (CKD-EPI) equation refit without adjustment for race.       Narrative:      GFR Normal >60  Chronic Kidney Disease <60  Kidney Failure <15      Troponin [[id number]]  (Normal) Collected: 09/07/22 1414    Specimen: Blood Updated: 09/07/22 1504     Troponin T <0.010 ng/mL     Narrative:      Troponin T Reference Range:  <= 0.03 ng/mL-   Negative for AMI  >0.03 ng/mL-     Abnormal for myocardial necrosis.  Clinicians would have to utilize clinical acumen, EKG, Troponin and serial changes to determine if it is an Acute Myocardial Infarction or myocardial injury due to an underlying chronic condition.       Results may be falsely decreased if patient taking Biotin.      CK [[id number]]  (Abnormal) Collected: 09/07/22 1414    Specimen: Blood Updated: 09/07/22 1446     Creatine Kinase 581 U/L     CBC Auto Differential [[id number]]  (Abnormal) Collected: 09/07/22 1414    Specimen: Blood Updated: 09/07/22 1425     WBC 9.64 10*3/mm3      RBC 3.85 10*6/mm3      Hemoglobin 11.9 g/dL      Hematocrit 35.4 %      MCV 91.9 fL      MCH 30.9 pg      MCHC 33.6 g/dL      RDW 12.2 %      RDW-SD 41.0 fl      MPV 10.3 fL      Platelets 153 10*3/mm3      Neutrophil % 74.0 %      Lymphocyte % 13.9 %      Monocyte % 11.4 %      Eosinophil % 0.1 %      Basophil % 0.2 %      Immature Grans % 0.4 %       Neutrophils, Absolute 7.13 10*3/mm3      Lymphocytes, Absolute 1.34 10*3/mm3      Monocytes, Absolute 1.10 10*3/mm3      Eosinophils, Absolute 0.01 10*3/mm3      Basophils, Absolute 0.02 10*3/mm3      Immature Grans, Absolute 0.04 10*3/mm3      nRBC 0.0 /100 WBC           Imaging Results (Last 24 Hours)     Procedure Component Value Units Date/Time    CT Head Without Contrast [[id number]] Collected: 09/07/22 1558     Updated: 09/07/22 1558    Narrative:      CT HEAD AND CERVICAL SPINE WITHOUT CONTRAST     CLINICAL HISTORY:Found down on ground. Complains of back and neck pain.     TECHNIQUE: CT scan of the head was obtained with 2 mm axial bone  algorithm and 3 mm axial soft tissue algorithm images. Sagittal and  coronal reconstructed images were obtained.     FINDINGS:     There is no evidence for a calvarial fracture. There is no evidence for  an acute extra-axial hemorrhage. The ventricles, sulci, and cisterns are  age-appropriate. There are mild changes of chronic small vessel ischemic  phenomena. There are findings compatible with a chronic infarct within  the inferior aspect of the left occipital lobe measuring up to 2.1 x 1.4  cm in greatest axial dimensions that is within the left PCA  distribution.     There is mild mucosal thickening identified within the sphenoid sinus  and the ethmoid air cells. Frothy secretions are appreciated within the  right aspect of the sphenoid sinus.       Impression:         No evidence for acute traumatic intracranial pathology.     Mild changes of chronic small vessel ischemic phenomena. Findings  compatible with a chronic infarct within the inferior aspect left  occipital lobe within the left PCA distribution.     TECHNIQUE: CT scan of the cervical spine was obtained with 1 mm axial  bone algorithm and 2 mm axial soft tissue algorithm images. Sagittal and  coronal reconstructed images were obtained.     FINDINGS:     There are stable mild degrees of vertebral body height loss  seen at the  C7, T1, T2, and T3 levels. These findings are unchanged when compared to  prior CT scan of the cervical spine dated 08/21/2020. There is no  convincing evidence to suggest acute fracture or bony malalignment  involving the cervical spine.     There are mild degrees of canal narrowing noted at the C2-C3, C3-C4,  C4-C5, C5-C6, and C6-C7 levels secondary to disc osteophyte complexes.  Relatively mild degrees of multilevel foraminal narrowing are also  appreciated from C3-C4 down to C6-C7.     IMPRESSION:     There is no convincing evidence to suggest acute fracture or bony  malalignment involving the cervical spine. There is mild  chronic-appearing vertebral body height loss from T7 down to T3 that is  unchanged in appearance when compared to the prior CT scan of the  cervical spine dated 08/21/2020.     Radiation dose reduction techniques were utilized, including automated  exposure control and exposure modulation based on body size.          CT Cervical Spine Without Contrast [[id number]] Collected: 09/07/22 1558     Updated: 09/07/22 1558    Narrative:      CT HEAD AND CERVICAL SPINE WITHOUT CONTRAST     CLINICAL HISTORY:Found down on ground. Complains of back and neck pain.     TECHNIQUE: CT scan of the head was obtained with 2 mm axial bone  algorithm and 3 mm axial soft tissue algorithm images. Sagittal and  coronal reconstructed images were obtained.     FINDINGS:     There is no evidence for a calvarial fracture. There is no evidence for  an acute extra-axial hemorrhage. The ventricles, sulci, and cisterns are  age-appropriate. There are mild changes of chronic small vessel ischemic  phenomena. There are findings compatible with a chronic infarct within  the inferior aspect of the left occipital lobe measuring up to 2.1 x 1.4  cm in greatest axial dimensions that is within the left PCA  distribution.     There is mild mucosal thickening identified within the sphenoid sinus  and the ethmoid air  cells. Frothy secretions are appreciated within the  right aspect of the sphenoid sinus.       Impression:         No evidence for acute traumatic intracranial pathology.     Mild changes of chronic small vessel ischemic phenomena. Findings  compatible with a chronic infarct within the inferior aspect left  occipital lobe within the left PCA distribution.     TECHNIQUE: CT scan of the cervical spine was obtained with 1 mm axial  bone algorithm and 2 mm axial soft tissue algorithm images. Sagittal and  coronal reconstructed images were obtained.     FINDINGS:     There are stable mild degrees of vertebral body height loss seen at the  C7, T1, T2, and T3 levels. These findings are unchanged when compared to  prior CT scan of the cervical spine dated 08/21/2020. There is no  convincing evidence to suggest acute fracture or bony malalignment  involving the cervical spine.     There are mild degrees of canal narrowing noted at the C2-C3, C3-C4,  C4-C5, C5-C6, and C6-C7 levels secondary to disc osteophyte complexes.  Relatively mild degrees of multilevel foraminal narrowing are also  appreciated from C3-C4 down to C6-C7.     IMPRESSION:     There is no convincing evidence to suggest acute fracture or bony  malalignment involving the cervical spine. There is mild  chronic-appearing vertebral body height loss from T7 down to T3 that is  unchanged in appearance when compared to the prior CT scan of the  cervical spine dated 08/21/2020.     Radiation dose reduction techniques were utilized, including automated  exposure control and exposure modulation based on body size.          XR Hip With or Without Pelvis 2 - 3 View Left [[id number]] Collected: 09/07/22 1514     Updated: 09/07/22 1518    Narrative:      XR HIP W OR WO PELVIS 2-3 VIEW LEFT-     INDICATIONS: Trauma     TECHNIQUE: Frontal views of the pelvis, 2 views of the left hip     COMPARISON: None available     FINDINGS:     Comminuted, angulated left intertrochanteric  fracture is noted. No other  fractures are identified. The bones are diffusely osteopenic. No  dislocation. Mild bilateral hip joint space narrowing is seen. Arterial  calcifications are noted.       Impression:         Left intertrochanteric fracture.     This report was finalized on 9/7/2022 3:15 PM by Dr. Dudley Lau M.D.       XR Chest 1 View [[id number]] Collected: 09/07/22 1508     Updated: 09/07/22 1515    Narrative:      XR CHEST 1 VW-     HISTORY: Female who is 89 years-old,  trauma     TECHNIQUE: Frontal views of the chest     COMPARISON: 08/21/2020     FINDINGS: The heart size is borderline. Left-sided generator device and  cardiac lead are seen. Aorta is calcified. Pulmonary vasculature is  unremarkable. An 8 mm nodular density at the right midlung appears  grossly similar from the coronal image 56 of the CT from 04/01/2018; if  indicated, CT could be obtained for direct comparison with prior CT  exam. No focal pulmonary consolidation, pleural effusion, or  pneumothorax. No acute osseous process.       Impression:      No focal pulmonary consolidation. Borderline heart size. A  right midlung nodule, as described.     This report was finalized on 9/7/2022 3:12 PM by Dr. Dudley Lau M.D.               ECG 12 Lead   Preliminary Result   HEART RATE= 131  bpm   RR Interval= 458  ms   CO Interval=   ms   P Horizontal Axis=   deg   P Front Axis=   deg   QRSD Interval= 106  ms   QT Interval= 319  ms   QRS Axis= -47  deg   T Wave Axis= 127  deg   - ABNORMAL ECG -   Atrial fibrillation   Left anterior fascicular block   Abnormal R-wave progression, early transition   LVH with secondary repolarization abnormality   Electronically Signed By:    Date and Time of Study: 2022-09-07 14:16:59        Assessment/Plan   Assessment & Plan   Active Hospital Problems    Diagnosis  POA   • **Closed comminuted intertrochanteric fracture of proximal end of left femur (HCC) [S72.142A]  Yes   • Osteoporosis with  pathological fracture [M80.00XA]  Yes   • Chronic diastolic congestive heart failure (HCC) [I50.32]  Yes   • Paroxysmal atrial fibrillation (HCC) [I48.0]  Yes   • B12 deficiency [E53.8]  Yes      Resolved Hospital Problems   No resolved problems to display.         Ms. Han is a 89 y.o. with a history of atrial fibrillation who is admitted with left intertrochanteric hip fracture.    · Admit to orthopedic floor.  · Dr. Yarbrough was contacted by the ED and will see in consultation.  · She has no prior ischemic cardiac history but she has presented in rapid atrial fibrillation.  She was given IV Lopressor in the emergency department with some improvement in her heart rate.  Will resume her beta-blocker and consult her cardiologist.  We will hold her Xarelto till after surgery.  · Review of notes from [facility] she has history of chronic diastolic CHF.  She is on Aldactone but no loop diuretic.  Will hold diuretics for now.  She seems volume stable.  · NPO after midnight for expected surgery tomorrow.  · Continue morphine IV started in ED for pain and Zofran IV as needed for nausea.  · Will check vitamin D and B12 level in a.m.  · Will ask CCP to evaluate as likely to need subacute rehab placement following surgery.  · SCDs for DVT prophylaxis.  · Full code.  · Discussed with patient, family and ED provider.      Isaias Rasmussen MD  [facility]  09/07/22  17:16 EDT

## 2022-09-07 NOTE — ED TRIAGE NOTES
Pt arrives via EMS from home. Pt's dtr found pt on ground, last seen Sunday. Pt reports being on the floor for 2-3 days. Pt c/o back and neck pain, hx of chronic pain, c-collar in place.  LLE rotation and shortening noted.  Hx of afib. Pt is on eliquis, pt reports hitting head.

## 2022-09-07 NOTE — PROGRESS NOTES
Ortho aware. Planning surgery tomorrow morning. Asked RN to contact medical team to see if cardiac clearance is needed prior to surgery. If so will plan for surgery once cleared.       NPO at [place]   Hold anticoagulation  Will see in am.  Official consult note to follow.    Gene Welsh MD

## 2022-09-07 NOTE — ED NOTES
Nursing report ED to floor  Brandy Han  89 y.o.  female    HPI :   Chief Complaint   Patient presents with   • Fall   • Hip Injury   • Head Injury       Admitting doctor:   Isaias Rasmussen MD    Admitting diagnosis:   The primary encounter diagnosis was Closed comminuted intertrochanteric fracture of left femur, initial encounter (Formerly [facility]). A diagnosis of Atrial fibrillation with RVR (Formerly [facility]) was also pertinent to this visit.    Code status:   Current Code Status     Date Active Code Status Order ID Comments User Context       Prior    Advance Care Planning Activity          Allergies:   Patient has no known allergies.    Intake and Output    Intake/Output Summary (Last 24 hours) at 9/7/2022 1620  Last data filed at 9/7/2022 1339  Gross per 24 hour   Intake 400 ml   Output --   Net 400 ml       Weight:   There were no vitals filed for this visit.    Most recent vitals:   Vitals:    09/07/22 1423 09/07/22 1431 09/07/22 1444 09/07/22 1531   BP: 135/84 95/82 134/87 141/70   Pulse: 96  92 102   Resp:       Temp:       SpO2: 95%  97% 95%       Active LDAs/IV Access:   Lines, Drains & Airways     Active LDAs     Name Placement date Placement time Site Days    Peripheral IV 09/07/22 1339 Left Antecubital 09/07/22  1339  Antecubital  less than 1                Labs (abnormal labs have a star):   Labs Reviewed   COMPREHENSIVE METABOLIC PANEL - Abnormal; Notable for the following components:       Result Value    Glucose 110 (*)     Albumin 3.40 (*)     Total Bilirubin 1.7 (*)     BUN/Creatinine Ratio 25.3 (*)     All other components within normal limits    Narrative:     GFR Normal >60  Chronic Kidney Disease <60  Kidney Failure <15     CK - Abnormal; Notable for the following components:    Creatine Kinase 581 (*)     All other components within normal limits   CBC WITH AUTO DIFFERENTIAL - Abnormal; Notable for the following components:    Hemoglobin 11.9 (*)     RDW 12.2 (*)     Lymphocyte % 13.9 (*)     Eosinophil % 0.1 (*)      Neutrophils, Absolute 7.13 (*)     Monocytes, Absolute 1.10 (*)     All other components within normal limits   TROPONIN (IN-HOUSE) - Normal    Narrative:     Troponin T Reference Range:  <= 0.03 ng/mL-   Negative for AMI  >0.03 ng/mL-     Abnormal for myocardial necrosis.  Clinicians would have to utilize clinical acumen, EKG, Troponin and serial changes to determine if it is an Acute Myocardial Infarction or myocardial injury due to an underlying chronic condition.       Results may be falsely decreased if patient taking Biotin.     CBC AND DIFFERENTIAL    Narrative:     The following orders were created for panel order CBC & Differential.  Procedure                               Abnormality         Status                     ---------                               -----------         ------                     CBC Auto Differential[[id number]]        Abnormal            Final result                 Please view results for these tests on the individual orders.       EKG:   ECG 12 Lead   Preliminary Result   HEART RATE= 131  bpm   RR Interval= 458  ms   NE Interval=   ms   P Horizontal Axis=   deg   P Front Axis=   deg   QRSD Interval= 106  ms   QT Interval= 319  ms   QRS Axis= -47  deg   T Wave Axis= 127  deg   - ABNORMAL ECG -   Atrial fibrillation   Left anterior fascicular block   Abnormal R-wave progression, early transition   LVH with secondary repolarization abnormality   Electronically Signed By:    Date and Time of Study: 2022-09-07 14:16:59          Meds given in ED:   Medications   sodium chloride 0.9 % bolus 1,000 mL (1,000 mL Intravenous New Bag 9/7/22 1412)   metoprolol tartrate (LOPRESSOR) injection 5 mg (5 mg Intravenous Given 9/7/22 1418)   morphine injection 4 mg (4 mg Intravenous Given 9/7/22 1442)       Imaging results:  CT Head Without Contrast    Result Date: 9/7/2022   No evidence for acute traumatic intracranial pathology.  Mild changes of chronic small vessel ischemic phenomena. 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  TECHNIQUE: CT scan of the cervical spine was obtained with 1 mm axial bone algorithm and 2 mm axial soft tissue algorithm images. Sagittal and coronal reconstructed images were obtained.  FINDINGS:  There are stable mild degrees of vertebral body height loss seen at the C7, T1, T2, and T3 levels. These findings are unchanged when compared to prior CT scan of the cervical spine dated 08/21/2020. There is no convincing evidence to suggest acute fracture or bony malalignment involving the cervical spine.  There are mild degrees of canal narrowing noted at the C2-C3, C3-C4, C4-C5, C5-C6, and C6-C7 levels secondary to disc osteophyte complexes. Relatively mild degrees of multilevel foraminal narrowing are also appreciated from C3-C4 down to C6-C7.  IMPRESSION:  There is no convincing evidence to suggest acute fracture or bony malalignment involving the cervical spine. There is mild chronic-appearing vertebral body height loss from T7 down to T3 that is unchanged in appearance when compared to the prior CT scan of the cervical spine dated 08/21/2020.  Radiation dose reduction techniques were utilized, including automated exposure control and exposure modulation based on body size.       CT Cervical Spine Without Contrast    Result Date: 9/7/2022   No evidence for acute traumatic intracranial pathology.  Mild changes of chronic small vessel ischemic phenomena. Findings compatible with a chronic infarct within the inferior aspect left occipital lobe within the left PCA distribution.  TECHNIQUE: CT scan of the cervical spine was obtained with 1 mm axial bone algorithm and 2 mm axial soft tissue algorithm images. Sagittal and coronal reconstructed images were obtained.  FINDINGS:  There are stable mild degrees of vertebral body height loss seen at the C7, T1, T2, and T3 levels. These findings are unchanged when  compared to prior CT scan of the cervical spine dated 08/21/2020. There is no convincing evidence to suggest acute fracture or bony malalignment involving the cervical spine.  There are mild degrees of canal narrowing noted at the C2-C3, C3-C4, C4-C5, C5-C6, and C6-C7 levels secondary to disc osteophyte complexes. Relatively mild degrees of multilevel foraminal narrowing are also appreciated from C3-C4 down to C6-C7.  IMPRESSION:  There is no convincing evidence to suggest acute fracture or bony malalignment involving the cervical spine. There is mild chronic-appearing vertebral body height loss from T7 down to T3 that is unchanged in appearance when compared to the prior CT scan of the cervical spine dated 08/21/2020.  Radiation dose reduction techniques were utilized, including automated exposure control and exposure modulation based on body size.       XR Chest 1 View    Result Date: 9/7/2022  No focal pulmonary consolidation. Borderline heart size. A right midlung nodule, as described.  This report was finalized on 9/7/2022 3:12 PM by Dr. Dudley Lau M.D.      XR Hip With or Without Pelvis 2 - 3 View Left    Result Date: 9/7/2022   Left intertrochanteric fracture.  This report was finalized on 9/7/2022 3:15 PM by Dr. Dudley Lau M.D.        Ambulatory status:   - assist x2    Social issues:   Social History     Socioeconomic History   • Marital status:    Tobacco Use   • Smoking status: Never Smoker   • Smokeless tobacco: Never Used   Substance and Sexual Activity   • Alcohol use: Yes     Alcohol/week: 1.0 standard drink     Types: 1 Shots of liquor per week   • Drug use: No   • Sexual activity: Defer       NIH Stroke Scale:        Nursing report ED to floor:

## 2022-09-08 ENCOUNTER — ANESTHESIA EVENT (OUTPATIENT)
Dept: PERIOP | Facility: HOSPITAL | Age: 87
End: 2022-09-08

## 2022-09-08 ENCOUNTER — APPOINTMENT (OUTPATIENT)
Dept: CARDIOLOGY | Facility: HOSPITAL | Age: 87
End: 2022-09-08

## 2022-09-08 ENCOUNTER — ANESTHESIA (OUTPATIENT)
Dept: PERIOP | Facility: HOSPITAL | Age: 87
End: 2022-09-08

## 2022-09-08 LAB
25(OH)D3 SERPL-MCNC: 5.4 NG/ML (ref 30–100)
ABO GROUP BLD: NORMAL
ANION GAP SERPL CALCULATED.3IONS-SCNC: 10 MMOL/L (ref 5–15)
AORTIC DIMENSIONLESS INDEX: 0.4 (DI)
BASOPHILS # BLD AUTO: 0.03 10*3/MM3 (ref 0–0.2)
BASOPHILS NFR BLD AUTO: 0.3 % (ref 0–1.5)
BH CV ECHO MEAS - AI P1/2T: 330.8 MSEC
BH CV ECHO MEAS - AO MAX PG: 20 MMHG
BH CV ECHO MEAS - AO MEAN PG: 10.6 MMHG
BH CV ECHO MEAS - AO V2 MAX: 223.7 CM/SEC
BH CV ECHO MEAS - AO V2 VTI: 40 CM
BH CV ECHO MEAS - AVA(I,D): 1.22 CM2
BH CV ECHO MEAS - EDV(CUBED): 76 ML
BH CV ECHO MEAS - EDV(MOD-SP2): 69 ML
BH CV ECHO MEAS - EDV(MOD-SP4): 87 ML
BH CV ECHO MEAS - EF(MOD-SP2): 72.5 %
BH CV ECHO MEAS - EF(MOD-SP4): 70.1 %
BH CV ECHO MEAS - ESV(CUBED): 37 ML
BH CV ECHO MEAS - ESV(MOD-SP2): 19 ML
BH CV ECHO MEAS - ESV(MOD-SP4): 26 ML
BH CV ECHO MEAS - FS: 21.3 %
BH CV ECHO MEAS - IVS/LVPW: 0.99 CM
BH CV ECHO MEAS - IVSD: 1.29 CM
BH CV ECHO MEAS - LV DIASTOLIC VOL/BSA (35-75): 46.2 CM2
BH CV ECHO MEAS - LV MASS(C)D: 202.1 GRAMS
BH CV ECHO MEAS - LV MAX PG: 2.34 MMHG
BH CV ECHO MEAS - LV MEAN PG: 1 MMHG
BH CV ECHO MEAS - LV SYSTOLIC VOL/BSA (12-30): 13.8 CM2
BH CV ECHO MEAS - LV V1 MAX: 76.5 CM/SEC
BH CV ECHO MEAS - LV V1 VTI: 13.2 CM
BH CV ECHO MEAS - LVIDD: 4.2 CM
BH CV ECHO MEAS - LVIDS: 3.3 CM
BH CV ECHO MEAS - LVOT AREA: 3.7 CM2
BH CV ECHO MEAS - LVOT DIAM: 2.17 CM
BH CV ECHO MEAS - LVPWD: 1.31 CM
BH CV ECHO MEAS - MV DEC SLOPE: 427.4 CM/SEC2
BH CV ECHO MEAS - MV MAX PG: 3.6 MMHG
BH CV ECHO MEAS - MV MEAN PG: 1.23 MMHG
BH CV ECHO MEAS - MV P1/2T: 67 MSEC
BH CV ECHO MEAS - MV V2 VTI: 19.4 CM
BH CV ECHO MEAS - MVA(P1/2T): 3.3 CM2
BH CV ECHO MEAS - MVA(VTI): 2.5 CM2
BH CV ECHO MEAS - PA ACC TIME: 0.08 SEC
BH CV ECHO MEAS - PA PR(ACCEL): 44.5 MMHG
BH CV ECHO MEAS - PA V2 MAX: 78.5 CM/SEC
BH CV ECHO MEAS - QP/QS: 0.86
BH CV ECHO MEAS - RAP SYSTOLE: 8 MMHG
BH CV ECHO MEAS - RV MAX PG: 1.52 MMHG
BH CV ECHO MEAS - RV V1 MAX: 61.7 CM/SEC
BH CV ECHO MEAS - RV V1 VTI: 9.2 CM
BH CV ECHO MEAS - RVOT DIAM: 2.41 CM
BH CV ECHO MEAS - RVSP: 37 MMHG
BH CV ECHO MEAS - SI(MOD-SP2): 26.5 ML/M2
BH CV ECHO MEAS - SI(MOD-SP4): 32.4 ML/M2
BH CV ECHO MEAS - SV(LVOT): 48.7 ML
BH CV ECHO MEAS - SV(MOD-SP2): 50 ML
BH CV ECHO MEAS - SV(MOD-SP4): 61 ML
BH CV ECHO MEAS - SV(RVOT): 42 ML
BH CV ECHO MEAS - TR MAX PG: 29.5 MMHG
BH CV ECHO MEAS - TR MAX VEL: 271.5 CM/SEC
BH CV XLRA - RV BASE: 4.4 CM
BH CV XLRA - RV LENGTH: 5.7 CM
BH CV XLRA - RV MID: 3.1 CM
BH CV XLRA - TDI S': 18.3 CM/SEC
BLD GP AB SCN SERPL QL: NEGATIVE
BUN SERPL-MCNC: 21 MG/DL (ref 8–23)
BUN/CREAT SERPL: 30.4 (ref 7–25)
CALCIUM SPEC-SCNC: 8 MG/DL (ref 8.2–9.6)
CHLORIDE SERPL-SCNC: 106 MMOL/L (ref 98–107)
CO2 SERPL-SCNC: 20 MMOL/L (ref 22–29)
CREAT SERPL-MCNC: 0.69 MG/DL (ref 0.57–1)
DEPRECATED RDW RBC AUTO: 44.1 FL (ref 37–54)
EGFRCR SERPLBLD CKD-EPI 2021: 81.5 ML/MIN/1.73
EOSINOPHIL # BLD AUTO: 0.05 10*3/MM3 (ref 0–0.4)
EOSINOPHIL NFR BLD AUTO: 0.6 % (ref 0.3–6.2)
ERYTHROCYTE [DISTWIDTH] IN BLOOD BY AUTOMATED COUNT: 12.5 % (ref 12.3–15.4)
GLUCOSE SERPL-MCNC: 85 MG/DL (ref 65–99)
HCT VFR BLD AUTO: 33.5 % (ref 34–46.6)
HGB BLD-MCNC: 10.9 G/DL (ref 12–15.9)
IMM GRANULOCYTES # BLD AUTO: 0.05 10*3/MM3 (ref 0–0.05)
IMM GRANULOCYTES NFR BLD AUTO: 0.6 % (ref 0–0.5)
LYMPHOCYTES # BLD AUTO: 1.81 10*3/MM3 (ref 0.7–3.1)
LYMPHOCYTES NFR BLD AUTO: 20.9 % (ref 19.6–45.3)
MAXIMAL PREDICTED HEART RATE: 128 BPM
MCH RBC QN AUTO: 31.5 PG (ref 26.6–33)
MCHC RBC AUTO-ENTMCNC: 32.5 G/DL (ref 31.5–35.7)
MCV RBC AUTO: 96.8 FL (ref 79–97)
MONOCYTES # BLD AUTO: 1.07 10*3/MM3 (ref 0.1–0.9)
MONOCYTES NFR BLD AUTO: 12.4 % (ref 5–12)
NEUTROPHILS NFR BLD AUTO: 5.64 10*3/MM3 (ref 1.7–7)
NEUTROPHILS NFR BLD AUTO: 65.2 % (ref 42.7–76)
NRBC BLD AUTO-RTO: 0 /100 WBC (ref 0–0.2)
PLATELET # BLD AUTO: 125 10*3/MM3 (ref 140–450)
PMV BLD AUTO: 10.1 FL (ref 6–12)
POTASSIUM SERPL-SCNC: 4.1 MMOL/L (ref 3.5–5.2)
RBC # BLD AUTO: 3.46 10*6/MM3 (ref 3.77–5.28)
RH BLD: POSITIVE
SINUS: 3.2 CM
SODIUM SERPL-SCNC: 136 MMOL/L (ref 136–145)
STRESS TARGET HR: 109 BPM
T&S EXPIRATION DATE: NORMAL
TSH SERPL DL<=0.05 MIU/L-ACNC: 1.99 UIU/ML (ref 0.27–4.2)
VIT B12 BLD-MCNC: 201 PG/ML (ref 211–946)
WBC NRBC COR # BLD: 8.65 10*3/MM3 (ref 3.4–10.8)

## 2022-09-08 PROCEDURE — 93306 TTE W/DOPPLER COMPLETE: CPT | Performed by: INTERNAL MEDICINE

## 2022-09-08 PROCEDURE — 86901 BLOOD TYPING SEROLOGIC RH(D): CPT | Performed by: ORTHOPAEDIC SURGERY

## 2022-09-08 PROCEDURE — 85025 COMPLETE CBC W/AUTO DIFF WBC: CPT | Performed by: HOSPITALIST

## 2022-09-08 PROCEDURE — 99222 1ST HOSP IP/OBS MODERATE 55: CPT | Performed by: ORTHOPAEDIC SURGERY

## 2022-09-08 PROCEDURE — 25010000002 PERFLUTREN (DEFINITY) 8.476 MG IN SODIUM CHLORIDE (PF) 0.9 % 10 ML INJECTION: Performed by: ANESTHESIOLOGY

## 2022-09-08 PROCEDURE — 86900 BLOOD TYPING SEROLOGIC ABO: CPT | Performed by: ORTHOPAEDIC SURGERY

## 2022-09-08 PROCEDURE — 86850 RBC ANTIBODY SCREEN: CPT | Performed by: ORTHOPAEDIC SURGERY

## 2022-09-08 PROCEDURE — 80048 BASIC METABOLIC PNL TOTAL CA: CPT | Performed by: HOSPITALIST

## 2022-09-08 PROCEDURE — 25010000002 FENTANYL CITRATE (PF) 50 MCG/ML SOLUTION: Performed by: ANESTHESIOLOGY

## 2022-09-08 PROCEDURE — 25010000002 MORPHINE PER 10 MG: Performed by: HOSPITALIST

## 2022-09-08 PROCEDURE — 25010000002 CYANOCOBALAMIN PER 1000 MCG: Performed by: HOSPITALIST

## 2022-09-08 PROCEDURE — 84443 ASSAY THYROID STIM HORMONE: CPT | Performed by: HOSPITALIST

## 2022-09-08 PROCEDURE — 82306 VITAMIN D 25 HYDROXY: CPT | Performed by: HOSPITALIST

## 2022-09-08 PROCEDURE — 99222 1ST HOSP IP/OBS MODERATE 55: CPT | Performed by: NURSE PRACTITIONER

## 2022-09-08 PROCEDURE — 93306 TTE W/DOPPLER COMPLETE: CPT

## 2022-09-08 PROCEDURE — 82607 VITAMIN B-12: CPT | Performed by: HOSPITALIST

## 2022-09-08 RX ORDER — FAMOTIDINE 10 MG/ML
20 INJECTION, SOLUTION INTRAVENOUS ONCE
Status: COMPLETED | OUTPATIENT
Start: 2022-09-08 | End: 2022-09-08

## 2022-09-08 RX ORDER — METOPROLOL SUCCINATE 100 MG/1
100 TABLET, EXTENDED RELEASE ORAL DAILY
Status: DISCONTINUED | OUTPATIENT
Start: 2022-09-08 | End: 2022-09-08

## 2022-09-08 RX ORDER — METOPROLOL SUCCINATE 50 MG/1
50 TABLET, EXTENDED RELEASE ORAL EVERY 12 HOURS SCHEDULED
Status: DISCONTINUED | OUTPATIENT
Start: 2022-09-08 | End: 2022-09-09

## 2022-09-08 RX ORDER — MIDAZOLAM HYDROCHLORIDE 1 MG/ML
0.5 INJECTION INTRAMUSCULAR; INTRAVENOUS
Status: DISCONTINUED | OUTPATIENT
Start: 2022-09-08 | End: 2022-09-08 | Stop reason: HOSPADM

## 2022-09-08 RX ORDER — CYANOCOBALAMIN 1000 UG/ML
1000 INJECTION, SOLUTION INTRAMUSCULAR; SUBCUTANEOUS DAILY
Status: COMPLETED | OUTPATIENT
Start: 2022-09-08 | End: 2022-09-12

## 2022-09-08 RX ORDER — CHOLECALCIFEROL (VITAMIN D3) 125 MCG
1000 CAPSULE ORAL DAILY
Status: DISCONTINUED | OUTPATIENT
Start: 2022-09-13 | End: 2022-09-16 | Stop reason: HOSPADM

## 2022-09-08 RX ORDER — SODIUM CHLORIDE 0.9 % (FLUSH) 0.9 %
3 SYRINGE (ML) INJECTION EVERY 12 HOURS SCHEDULED
Status: DISCONTINUED | OUTPATIENT
Start: 2022-09-08 | End: 2022-09-08 | Stop reason: HOSPADM

## 2022-09-08 RX ORDER — DIGOXIN 250 MCG
250 TABLET ORAL
Status: DISCONTINUED | OUTPATIENT
Start: 2022-09-08 | End: 2022-09-09

## 2022-09-08 RX ORDER — DIGOXIN 125 MCG
125 TABLET ORAL
COMMUNITY

## 2022-09-08 RX ORDER — SODIUM CHLORIDE 0.9 % (FLUSH) 0.9 %
3-10 SYRINGE (ML) INJECTION AS NEEDED
Status: DISCONTINUED | OUTPATIENT
Start: 2022-09-08 | End: 2022-09-08 | Stop reason: HOSPADM

## 2022-09-08 RX ORDER — CEFAZOLIN SODIUM 2 G/100ML
2 INJECTION, SOLUTION INTRAVENOUS ONCE
Status: DISCONTINUED | OUTPATIENT
Start: 2022-09-08 | End: 2022-09-08 | Stop reason: HOSPADM

## 2022-09-08 RX ORDER — METOPROLOL SUCCINATE 50 MG/1
50 TABLET, EXTENDED RELEASE ORAL DAILY
Status: DISCONTINUED | OUTPATIENT
Start: 2022-09-08 | End: 2022-09-08

## 2022-09-08 RX ORDER — ERGOCALCIFEROL 1.25 MG/1
50000 CAPSULE ORAL
Status: DISCONTINUED | OUTPATIENT
Start: 2022-09-09 | End: 2022-09-16 | Stop reason: HOSPADM

## 2022-09-08 RX ORDER — ATORVASTATIN CALCIUM 20 MG/1
20 TABLET, FILM COATED ORAL DAILY
Status: DISCONTINUED | OUTPATIENT
Start: 2022-09-08 | End: 2022-09-16 | Stop reason: HOSPADM

## 2022-09-08 RX ORDER — METOPROLOL SUCCINATE 50 MG/1
50 TABLET, EXTENDED RELEASE ORAL ONCE
Status: DISCONTINUED | OUTPATIENT
Start: 2022-09-08 | End: 2022-09-08

## 2022-09-08 RX ORDER — FENTANYL CITRATE 50 UG/ML
50 INJECTION, SOLUTION INTRAMUSCULAR; INTRAVENOUS
Status: DISCONTINUED | OUTPATIENT
Start: 2022-09-08 | End: 2022-09-08 | Stop reason: HOSPADM

## 2022-09-08 RX ORDER — SODIUM CHLORIDE, SODIUM LACTATE, POTASSIUM CHLORIDE, CALCIUM CHLORIDE 600; 310; 30; 20 MG/100ML; MG/100ML; MG/100ML; MG/100ML
9 INJECTION, SOLUTION INTRAVENOUS CONTINUOUS
Status: DISCONTINUED | OUTPATIENT
Start: 2022-09-08 | End: 2022-09-14

## 2022-09-08 RX ORDER — LIDOCAINE HYDROCHLORIDE 10 MG/ML
0.5 INJECTION, SOLUTION EPIDURAL; INFILTRATION; INTRACAUDAL; PERINEURAL ONCE AS NEEDED
Status: DISCONTINUED | OUTPATIENT
Start: 2022-09-08 | End: 2022-09-08 | Stop reason: HOSPADM

## 2022-09-08 RX ADMIN — CYANOCOBALAMIN 1000 MCG: 1000 INJECTION, SOLUTION INTRAMUSCULAR; SUBCUTANEOUS at 12:51

## 2022-09-08 RX ADMIN — DIGOXIN 250 MCG: 250 TABLET ORAL at 12:50

## 2022-09-08 RX ADMIN — METOPROLOL SUCCINATE 50 MG: 50 TABLET, EXTENDED RELEASE ORAL at 20:19

## 2022-09-08 RX ADMIN — SODIUM CHLORIDE 75 ML/HR: 9 INJECTION, SOLUTION INTRAVENOUS at 06:23

## 2022-09-08 RX ADMIN — PERFLUTREN 2 ML: 6.52 INJECTION, SUSPENSION INTRAVENOUS at 19:03

## 2022-09-08 RX ADMIN — MORPHINE SULFATE 2 MG: 2 INJECTION, SOLUTION INTRAMUSCULAR; INTRAVENOUS at 23:17

## 2022-09-08 RX ADMIN — ATORVASTATIN CALCIUM 20 MG: 20 TABLET, FILM COATED ORAL at 11:21

## 2022-09-08 RX ADMIN — FENTANYL CITRATE 50 MCG: 50 INJECTION INTRAMUSCULAR; INTRAVENOUS at 16:42

## 2022-09-08 RX ADMIN — METOPROLOL SUCCINATE 50 MG: 50 TABLET, EXTENDED RELEASE ORAL at 12:51

## 2022-09-08 RX ADMIN — FAMOTIDINE 20 MG: 10 INJECTION, SOLUTION INTRAVENOUS at 16:42

## 2022-09-08 NOTE — PROGRESS NOTES
Case has been canceled this evening his anesthesia is requesting repeat echo and cardiology evaluation.    We will plan to reschedule the case for tomorrow depending on clearance and or availability.    I discussed this with the patient and she is understanding to this.    eGne Welsh MD

## 2022-09-08 NOTE — PROGRESS NOTES
[facility] Clinical Pharmacy Services: Medication Reconciliation     Brandy Han is a 89 y.o. 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       Allergies as of 09/07/2022   • (No Known Allergies)          Medication information was obtained from: Pharmacy, Patients caregiver, and medication bottles  Pharmacy and Phone Number: Brian [phone]    Prior to Admission Medications       Prescriptions Last Dose Informant Patient Reported? Taking?    atorvastatin (LIPITOR) 20 MG tablet  Medication Bottle No Yes    TAKE 1 TABLET BY MOUTH DAILY    digoxin (LANOXIN) 125 MCG tablet  Medication Bottle Yes Yes    Take 125 mcg by mouth Daily.    metoprolol succinate XL (TOPROL-XL) 50 MG 24 hr tablet  Medication Bottle No Yes    TAKE 1 TABLET BY MOUTH DAILY    spironolactone (ALDACTONE) 25 MG tablet  Medication Bottle No Yes    TAKE 1 TABLET BY MOUTH DAILY    Xarelto 15 MG tablet 9/5/2022 Medication Bottle No Yes    TAKE 1 TABLET BY MOUTH DAILY           Medication notes:   PTA list previously listed Digoxin 250 mcg daily. After speaking with the patient, updated to Digoxin 125 mcg daily.    This medication list is complete to the best of my knowledge as of 9/8/2022       Please call if questions.     Yani Cross, [facility]  Pharmacy Resident

## 2022-09-08 NOTE — CASE MANAGEMENT/SOCIAL WORK
Discharge Planning Assessment  [facility]     Patient Name: Brandy Han  MRN: [id number]  Today's Date: 9/8/2022    Admit Date: 9/7/2022     Discharge Needs Assessment     Row Name 09/08/22 0934       Living Environment    People in Home alone    Current Living Arrangements home    Primary Care Provided by self    Provides Primary Care For no one, unable/limited ability to care for self    Family Caregiver if Needed child(landon), adult    Family Caregiver Names daughter, Lisa Jacques ( [phone])    Quality of Family Relationships helpful;involved;supportive    Able to Return to Prior Arrangements yes       Resource/Environmental Concerns    Resource/Environmental Concerns none    Transportation Concerns none       Transition Planning    Patient/Family Anticipates Transition to inpatient rehabilitation facility    Patient/Family Anticipated Services at Transition rehabilitation services    Transportation Anticipated family or friend will provide       Discharge Needs Assessment    Readmission Within the Last 30 Days no previous admission in last 30 days    Equipment Currently Used at Home walker, rolling;shower chair;commode    Concerns to be Addressed no discharge needs identified;denies needs/concerns at this time               Discharge Plan     Row Name 09/08/22 0464       Plan    Plan SNF- need to follow up with daughter for facility choices    Patient/Family in Agreement with Plan yes    Plan Comments Spoke with patient and daughter, Lisa Jacques ( [phone]) at bedside.  Introduced self and explained role.  Facesheet verified.  Patient lives alone in a single story condo with 2 steps to enter.  Her daughter lives close by and provides assistance with ADLS and grocery shopping as needed.  Patient ambulates with a walker and has a shower chair and 3 in 1 commode.  She has been to [facility] in the past, but would not want to return.  At this time, medical clearance for surgery is pending.   Patient and daughter anticipate SNF will be needed @ [facility].  Daughter provided with SNF list, [facility], and CCP contact information.  Daughter encouraged to begin thinking of facility choices.  Transfer packet started and in cubbie.  CCP will continue to follow. Jennifer Diggs RN              Continued Care and Services - Admitted Since 9/7/2022    Coordination has not been started for this encounter.       Expected Discharge Date and Time     Expected Discharge Date Expected Discharge Time    Sep 12, 2022          Demographic Summary     Row Name 09/08/22 0933       General Information    Arrived From emergency department    Required Notices Provided Important Message from Medicare    Referral Source admission list    Reason for Consult discharge planning    Preferred Language English               Functional Status     Row Name 09/08/22 0933       Functional Status    Usual Activity Tolerance fair    Current Activity Tolerance poor       Functional Status, IADL    Medications assistive person    Meal Preparation assistive person    Housekeeping assistive person    Laundry assistive person    Shopping assistive person    IADL Comments daughter, Lisa Jacques ( [phone]), lives close by and assists the patient as needed       Mental Status    General Appearance WDL WDL       Mental Status Summary    Recent Changes in Mental Status/Cognitive Functioning no changes               Psychosocial    No documentation.                Abuse/Neglect    No documentation.                Legal    No documentation.                Substance Abuse    No documentation.                Patient Forms    No documentation.                   Jennifer Diggs RN

## 2022-09-08 NOTE — CONSULTS
Patient Name: Brandy Han  :1930  89 y.o.    Date of Admission: 2022  Date of Consultation:  22  Encounter Provider: SHERITA Garcia  Place of Service: [facility] CARDIOLOGY  Referring Provider: Isaias Rasmussen MD  Patient Care Team:  Rupal Sutton MD as PCP - General      Chief complaint: fall    History of Present Illness: Brandy Han is a 89 year old woman who lives alone. She has chronic atrial fibrillation and sick sinus syndrome status post permanent pacemaker. She has carotid artery stenosis with total occlusion of her right carotid artery. She has hyperlipidemia and osteoarthritis. She follows closely with Dr. Radha Louise. She was seen in the office in 2022. She noted her to be unsteady on her feet and briefly discussed GUY occlusive devices which the patient was not interested in at the time.     She presented to the emergency room yesterday after sustaining an unwitnessed fall. Her memory of the event is quite poor but she thinks she blacked out. Daughter reports - her positioning in the room (away from her walker, away from her shoes, and behind the recliner) was quite bizarre. No one really knows what happened. She was found to have a left hip fracture. She was in AF with Hr 130's.     Ortho was consulted and plan is for surgery today.     I have been asked to see for Afib and preop clearance.     Pacemaker was interrogated without correlating arrhythmia to event.       ECHO 18  Summary    Overall left ventricular function is normal.    EF 51% .    Mitral valve tissue Doppler E/E'' ratio consistent with elevated left atrial    pressures.    Moderately dilated left atrium.    There is moderate right atrial enlargement.    Pacemaker lead was visualized in the right atrium and ventricle.    Trace-to-mild mitral regurgitation is present.    Mild aortic regurgitation    Mild tricuspid regurgitation.    Mild dilation of the aortic root.        Past Medical History:   Diagnosis Date   • Anxiety    • Arterial thrombosis (HCC)    • Arthritis    • Atrial fibrillation (HCC)    • Headache    • Heart palpitations    • Migraine    • Syncope    • UTI (urinary tract infection)        Past Surgical History:   Procedure Laterality Date   • ANKLE SURGERY     • CARDIAC PACEMAKER PLACEMENT     • OTHER SURGICAL HISTORY Left     elbow surgery   • PACEMAKER IMPLANTATION           Prior to Admission medications    Medication Sig Start Date End Date Taking? Authorizing Provider   Xarelto 15 MG tablet TAKE 1 TABLET BY MOUTH DAILY 6/17/22  Yes Rupal Sutton MD   acetaminophen (TYLENOL) 325 MG tablet Take 2 tablets by mouth Every 4 (Four) Hours As Needed for Mild Pain . 8/25/20   Pradeep Eden MD   atorvastatin (LIPITOR) 20 MG tablet TAKE 1 TABLET BY MOUTH DAILY 6/13/22   Rupal Sutton MD   bisacodyl (DULCOLAX) 5 MG EC tablet Take 1 tablet by mouth Daily As Needed for Constipation. 8/25/20   Pradeep Eden MD   digoxin (LANOXIN) 250 MCG tablet Take 250 mcg by mouth Daily.    Provider, MD Zayra   docusate sodium 100 MG capsule Take 100 mg by mouth 2 (Two) Times a Day. 8/25/20   Pradeep Eden MD   metoprolol succinate XL (TOPROL-XL) 50 MG 24 hr tablet TAKE 1 TABLET BY MOUTH DAILY 5/31/22   Rupal Sutton MD   Mirabegron ER (MYRBETRIQ) 25 MG tablet sustained-release 24 hour 24 hr tablet Take 25 mg by mouth Daily.    Provider, MD Zayra   spironolactone (ALDACTONE) 25 MG tablet TAKE 1 TABLET BY MOUTH DAILY 3/4/21   Rupal Sutton MD       No Known Allergies    Social History     Socioeconomic History   • Marital status:    Tobacco Use   • Smoking status: Never Smoker   • Smokeless tobacco: Never Used   Substance and Sexual Activity   • Alcohol use: Yes     Alcohol/week: 1.0 standard drink     Types: 1 Shots of liquor per week   • Drug use: No   • Sexual activity: Defer       Family History   Problem Relation Age of Onset    • Heart disease Mother    • Lung cancer Father    • Stroke Daughter         cerebral accident       REVIEW OF SYSTEMS:   All systems reviewed.  Pertinent positives identified in HPI.  All other systems are negative.      Objective:     Vitals:    09/07/22 2343 09/08/22 0407 09/08/22 0700 09/08/22 0900   BP: 109/65  (!) 122/111 126/71   BP Location: Right arm  Right arm Right arm   Patient Position: Lying  Lying Lying   Pulse: 105 103 112 105   Resp: 18 18 18    Temp: 98.1 °F (36.7 °C)  99.3 °F (37.4 °C)    TempSrc: Oral  Oral    SpO2: 96% 96%     Weight:       Height:         Body mass index is 26.66 kg/m².    General Appearance:    Alert, cooperative, in no acute distress   Head:    Normocephalic, without obvious abnormality, atraumatic   Eyes:            Lids and lashes normal, conjunctivae and sclerae normal, no icterus, no pallor, corneas clear, PERRLA   Ears:    Ears appear intact with no abnormalities noted   Throat:   No oral lesions, no thrush, oral mucosa moist   Neck:    no JVD   Back:     No kyphosis present, no scoliosis present, no skin lesions, erythema or scars, no tenderness to percussion or palpation, range of motion normal   Lungs:     Clear to auscultation, respirations regular, even and unlabored    Heart:    irregular rhythm and normal rate, normal S1 and S2, no murmur, no gallop, no rub, no click   Chest Wall:    No abnormalities observed   Abdomen:     Normal bowel sounds, no masses, no organomegaly, soft, nontender, nondistended, no guarding, no rebound  tenderness   Extremities:   Moves all extremities well, no edema, no cyanosis, no redness   Pulses:   Pulses palpable and equal bilaterally. Normal radial, carotid, femoral, dorsalis pedis and posterior tibial pulses bilaterally. Normal abdominal aorta   Skin:  Psychiatric:   No bleeding, bruising or rash    Alert and oriented x 3, normal mood and affect   Lab Review:     Results from last 7 days   Lab Units 09/08/22  0634 09/07/22  1414    SODIUM mmol/L 136 137   POTASSIUM mmol/L 4.1 4.1   CHLORIDE mmol/L 106 102   CO2 mmol/L 20.0* 23.0   BUN mg/dL 21 22   CREATININE mg/dL 0.69 0.87   CALCIUM mg/dL 8.0* 8.7   BILIRUBIN mg/dL  --  1.7*   ALK PHOS U/L  --  69   ALT (SGPT) U/L  --  8   AST (SGOT) U/L  --  24   GLUCOSE mg/dL 85 110*     Results from last 7 days   Lab Units 09/07/22  1414   CK TOTAL U/L 581*   TROPONIN T ng/mL <0.010     Results from last 7 days   Lab Units 09/08/22  0634   WBC 10*3/mm3 8.65   HEMOGLOBIN g/dL 10.9*   HEMATOCRIT % 33.5*   PLATELETS 10*3/mm3 125*                                               Assessment and Plan:       1. Chronic atrial fibrillation - presented with RVR. Poor rate control on device interrogation. Increase beta blocker. Continue digoxin (home medication). Anticoagulation held for surgery.   2. Unwitnessed fall - can not rule out syncope. Do no appreciate critical murmur on physical exam indicated aortic stenosis. No correlating arrhythmia on device interrogation. Possible hypotension. Follow BP here closely.   3. Carotid artery stenosis  4. Hyperlipidemia      Etiology of fall unclear. No obvious cardiac source.   She does not appear to be in heart failure. No evidence of ACS.   Will adjust beta blocker to control heart rate. Continue digoxin. AC held.     Revised cardiac risk index , low risk however this does not take into account her age. She is moderately increased risk which is not modifiable. Risk of morbidity/mortality without surgery is greater than this.     Would proceed with surgery as indicated. No additional cardiac testing recommended at this time.     Discussed with daughter at bedside.     Monet Viramontes, SHERITA  09/08/22  12:05 EDT               Private car

## 2022-09-08 NOTE — CONSULTS
ORTHO CONSULT NOTE    Patient Identification:        Name: Brandy Han  Age: 89 y.o.  Sex: female  :  1930  MRN: [id number]                                                    HPI:        Brandy Han is a 89 y.o. year old female who presented to the ED after a fall onto the left hip.  She was unable to bear weight on the hip after the fall.  The patient developed immediate pain in the hip after the fall.  The patient was evaulated in the ER and was found to have a hip fracture. The patient was admitted to the hospitalist service and orthopaedics was consulted for management of the fracture. The patient currently complains of pain in the hip.  No numbness or tingling.  No chest pain or shortness of breath.      Problem List:  Patient Active Problem List   Diagnosis   • History of venous thrombosis   • Bilateral carotid artery stenosis   • Osteoporosis   • Hyperlipidemia   • Right-sided low back pain with right-sided sciatica   • Sinus arrest   • Presence of cardiac pacemaker   • B12 deficiency   • Chronic pain of both knees   • Non-traumatic rhabdomyolysis   • Closed traumatic nondisplaced fracture of sixth cervical vertebra (HCC)   • Closed nondisplaced fracture of left lateral portion of seventh cervical vertebra (HCC)   • Vitamin D deficiency   • Paroxysmal atrial fibrillation (HCC)   • Overactive bladder   • Closed comminuted intertrochanteric fracture of proximal end of left femur (HCC)   • Osteoporosis with pathological fracture   • Chronic diastolic congestive heart failure (HCC)       Past Medical History:  Past Medical History:   Diagnosis Date   • Anxiety    • Arterial thrombosis (HCC)    • Arthritis    • Atrial fibrillation (HCC)    • Headache    • Heart palpitations    • Migraine    • Syncope    • UTI (urinary tract infection)        Past Surgical History:  Past Surgical History:   Procedure Laterality Date   • ANKLE SURGERY     • CARDIAC PACEMAKER PLACEMENT     • OTHER SURGICAL HISTORY Left      elbow surgery   • PACEMAKER IMPLANTATION         Home Meds:  Facility-Administered Medications Prior to Admission   Medication Dose Route Frequency Provider Last Rate Last Admin   • [DISCONTINUED] cyanocobalamin injection 1,000 mcg  1,000 mcg Intramuscular Q28 Days Rupal Sutton MD   1,000 mcg at 05/04/21 1623     Medications Prior to Admission   Medication Sig Dispense Refill Last Dose   • acetaminophen (TYLENOL) 325 MG tablet Take 2 tablets by mouth Every 4 (Four) Hours As Needed for Mild Pain .      • atorvastatin (LIPITOR) 20 MG tablet TAKE 1 TABLET BY MOUTH DAILY 90 tablet 1    • bisacodyl (DULCOLAX) 5 MG EC tablet Take 1 tablet by mouth Daily As Needed for Constipation.      • digoxin (LANOXIN) 250 MCG tablet Take 250 mcg by mouth Daily.      • docusate sodium 100 MG capsule Take 100 mg by mouth 2 (Two) Times a Day.      • metoprolol succinate XL (TOPROL-XL) 50 MG 24 hr tablet TAKE 1 TABLET BY MOUTH DAILY 90 tablet 1    • spironolactone (ALDACTONE) 25 MG tablet TAKE 1 TABLET BY MOUTH DAILY 90 tablet 3    • Xarelto 15 MG tablet TAKE 1 TABLET BY MOUTH DAILY 30 tablet 0 9/5/2022 at Unknown time       Current Meds:   Scheduled Meds:atorvastatin, 20 mg, Oral, Daily  cyanocobalamin, 1,000 mcg, Intramuscular, Daily  digoxin, 250 mcg, Oral, Daily  metoprolol succinate XL, 50 mg, Oral, Daily  [START ON 9/13/2022] vitamin B-12, 1,000 mcg, Oral, Daily  [START ON 9/9/2022] vitamin D, 50,000 Units, Oral, Q7 Days      Continuous Infusions:   PRN Meds:.•  acetaminophen  •  HYDROcodone-acetaminophen  •  Morphine  •  ondansetron **OR** ondansetron    Allergies:  No Known Allergies    Social History:   Social History     Tobacco Use   • Smoking status: Never Smoker   • Smokeless tobacco: Never Used   Substance Use Topics   • Alcohol use: Yes     Alcohol/week: 1.0 standard drink     Types: 1 Shots of liquor per week       Family History:  Family History   Problem Relation Age of Onset   • Heart disease Mother    • Lung  "cancer Father    • Stroke Daughter         cerebral accident       Review of Systems:      Negative for fever, chills, nausea, vomiting, chest pain, shortness of breath, headache, dizziness, vision changes, or slurred speech.  All other pertinent positives and negatives as noted above in HPI.      Vitals:   Blood pressure 126/71, pulse 105, temperature 99.3 °F (37.4 °C), temperature source Oral, resp. rate 18, height 170.2 cm (67\"), weight 77.2 kg (170 lb 3.1 oz), SpO2 96 %.    I/O:     Intake/Output Summary (Last 24 hours) at 9/8/2022 1006  Last data filed at 9/8/2022 0947  Gross per 24 hour   Intake 1440 ml   Output 650 ml   Net 790 ml       Physical Exam:        General - awake, alert, and oriented x 3, answers questions appropriately, well nourished, no acute distress  HEENT: atraumatic  Neck: supple  Skin: no rash  Lung: unlabored breathing  CV: palpable dp/pt pulses    left lower extremity:  Extremity held in externally rotated position  Slightly shortened compared to contralateral extremity  Tender to palpation over greater trochanter  Able to dorsiflex ankle and move toes  Pain with any IR/ER of hip  Unable to test ROM due to pain  Sensation grossly intact to light touch throughout  Distal pulses intact  No calf swelling  Natalie's sign negative  Compartments soft  No signs or symptoms of DVT or compartment syndrome    Exam of the contralateral hip is normal:  No atrophy, erythema, ecchymosis, or gross deformity noted  No tenderness to palpation  Full active range of motion  5/5 strength in hip flexion, abduction, and adduction  Stinchfield and straight leg raise negative  VANNESSA negative  Sensation grossly intact to light tough throughout  Skin and distal pulses intact    No tenderness to bilateral upper extremity or deformity    Labs:      Lab Results (last 24 hours)     Procedure Component Value Units Date/Time    Vitamin D 25 Hydroxy [[id number]]  (Abnormal) Collected: 09/08/22 0634    Specimen: Blood " Updated: 09/08/22 0749     25 Hydroxy, Vitamin D 5.4 ng/ml     Narrative:      Reference Range for Total Vitamin D 25(OH)     Deficiency <20.0 ng/mL   Insufficiency 21-29 ng/mL   Sufficiency  ng/mL  Toxicity >100 ng/ml    Results may be falsely increased if patient taking Biotin.      Vitamin B12 [[id number]]  (Abnormal) Collected: 09/08/22 0634    Specimen: Blood Updated: 09/08/22 0749     Vitamin B-12 201 pg/mL     Narrative:      Results may be falsely increased if patient taking Biotin.      TSH [[id number]]  (Normal) Collected: 09/08/22 0634    Specimen: Blood Updated: 09/08/22 0740     TSH 1.990 uIU/mL     Basic Metabolic Panel [[id number]]  (Abnormal) Collected: 09/08/22 0634    Specimen: Blood Updated: 09/08/22 0733     Glucose 85 mg/dL      BUN 21 mg/dL      Creatinine 0.69 mg/dL      Sodium 136 mmol/L      Potassium 4.1 mmol/L      Comment: Slight hemolysis detected by analyzer. Results may be affected.        Chloride 106 mmol/L      CO2 20.0 mmol/L      Calcium 8.0 mg/dL      BUN/Creatinine Ratio 30.4     Anion Gap 10.0 mmol/L      eGFR 81.5 mL/min/1.73      Comment: National Kidney Foundation and American Society of Nephrology (ASN) Task Force recommended calculation based on the Chronic Kidney Disease Epidemiology Collaboration (CKD-EPI) equation refit without adjustment for race.       Narrative:      GFR Normal >60  Chronic Kidney Disease <60  Kidney Failure <15      CBC & Differential [[id number]]  (Abnormal) Collected: 09/08/22 0634    Specimen: Blood Updated: 09/08/22 0727    Narrative:      The following orders were created for panel order CBC & Differential.  Procedure                               Abnormality         Status                     ---------                               -----------         ------                     CBC Auto Differential[[id number]]        Abnormal            Final result                 Please view results for these tests on the individual orders.    CBC Auto  Differential [[id number]]  (Abnormal) Collected: 09/08/22 0634    Specimen: Blood Updated: 09/08/22 0727     WBC 8.65 10*3/mm3      RBC 3.46 10*6/mm3      Hemoglobin 10.9 g/dL      Hematocrit 33.5 %      MCV 96.8 fL      MCH 31.5 pg      MCHC 32.5 g/dL      RDW 12.5 %      RDW-SD 44.1 fl      MPV 10.1 fL      Platelets 125 10*3/mm3      Neutrophil % 65.2 %      Lymphocyte % 20.9 %      Monocyte % 12.4 %      Eosinophil % 0.6 %      Basophil % 0.3 %      Immature Grans % 0.6 %      Neutrophils, Absolute 5.64 10*3/mm3      Lymphocytes, Absolute 1.81 10*3/mm3      Monocytes, Absolute 1.07 10*3/mm3      Eosinophils, Absolute 0.05 10*3/mm3      Basophils, Absolute 0.03 10*3/mm3      Immature Grans, Absolute 0.05 10*3/mm3      nRBC 0.0 /100 WBC     Troponin [[id number]]  (Normal) Collected: 09/07/22 1414    Specimen: Blood Updated: 09/07/22 1504     Troponin T <0.010 ng/mL     Narrative:      Troponin T Reference Range:  <= 0.03 ng/mL-   Negative for AMI  >0.03 ng/mL-     Abnormal for myocardial necrosis.  Clinicians would have to utilize clinical acumen, EKG, Troponin and serial changes to determine if it is an Acute Myocardial Infarction or myocardial injury due to an underlying chronic condition.       Results may be falsely decreased if patient taking Biotin.      Comprehensive Metabolic Panel [[id number]]  (Abnormal) Collected: 09/07/22 1414    Specimen: Blood Updated: 09/07/22 1446     Glucose 110 mg/dL      BUN 22 mg/dL      Creatinine 0.87 mg/dL      Sodium 137 mmol/L      Potassium 4.1 mmol/L      Chloride 102 mmol/L      CO2 23.0 mmol/L      Calcium 8.7 mg/dL      Total Protein 6.1 g/dL      Albumin 3.40 g/dL      ALT (SGPT) 8 U/L      AST (SGOT) 24 U/L      Alkaline Phosphatase 69 U/L      Total Bilirubin 1.7 mg/dL      Globulin 2.7 gm/dL      A/G Ratio 1.3 g/dL      BUN/Creatinine Ratio 25.3     Anion Gap 12.0 mmol/L      eGFR 62.6 mL/min/1.73      Comment: National Kidney Foundation and American Society of  Nephrology (ASN) Task Force recommended calculation based on the Chronic Kidney Disease Epidemiology Collaboration (CKD-EPI) equation refit without adjustment for race.       Narrative:      GFR Normal >60  Chronic Kidney Disease <60  Kidney Failure <15      CK [[id number]]  (Abnormal) Collected: 09/07/22 1414    Specimen: Blood Updated: 09/07/22 1446     Creatine Kinase 581 U/L     CBC & Differential [[id number]]  (Abnormal) Collected: 09/07/22 1414    Specimen: Blood Updated: 09/07/22 1425    Narrative:      The following orders were created for panel order CBC & Differential.  Procedure                               Abnormality         Status                     ---------                               -----------         ------                     CBC Auto Differential[[id number]]        Abnormal            Final result                 Please view results for these tests on the individual orders.    CBC Auto Differential [[id number]]  (Abnormal) Collected: 09/07/22 1414    Specimen: Blood Updated: 09/07/22 1425     WBC 9.64 10*3/mm3      RBC 3.85 10*6/mm3      Hemoglobin 11.9 g/dL      Hematocrit 35.4 %      MCV 91.9 fL      MCH 30.9 pg      MCHC 33.6 g/dL      RDW 12.2 %      RDW-SD 41.0 fl      MPV 10.3 fL      Platelets 153 10*3/mm3      Neutrophil % 74.0 %      Lymphocyte % 13.9 %      Monocyte % 11.4 %      Eosinophil % 0.1 %      Basophil % 0.2 %      Immature Grans % 0.4 %      Neutrophils, Absolute 7.13 10*3/mm3      Lymphocytes, Absolute 1.34 10*3/mm3      Monocytes, Absolute 1.10 10*3/mm3      Eosinophils, Absolute 0.01 10*3/mm3      Basophils, Absolute 0.02 10*3/mm3      Immature Grans, Absolute 0.04 10*3/mm3      nRBC 0.0 /100 WBC           Diagnostic Studies:      AP of the pelvis and lateral of the left hip were reviewed from the ED.  There is an intertrochanteric fracture of the hip with moderatecomminution at the fracture site.  The fracture is in significant varus. There are no periosteal reactions  or medullary lesions seen.    Comparison films not available      Assessment:     left  hip intertrochanteric fracture     Plan:      I have discussed the nature of intertrochanteric fractures with the patient and family.  This fracture will require intramedullary fixation.  The surgical procedure will be scheduled once all medical and cardiac clearances have been obtained.    The risks, benefits, and alternatives of hip fracture surgery were discussed extensively.  The risks include but are not limited to infection, DVT, PE, bleeding and blood loss, damage to nerves or blood vessels, malunion or nonunion, dislocation, continued pain, hip stiffness/loss of motion, hardware irritation, and the possibility of future arthrosis of the hip.  The risks of anesthesia including heart attack, stroke, and even death were discussed.  Also discussed morbidity mortality with hip fractures in this age group patient and daughter were understanding.  The typical post-operative course and rehab plan were discussed as well.  Activity restrictions following the surgery were emphasized and the patient expressed understanding.  All the patient's questions were answered.  A consent form was signed and placed on the chart.  The patient will receive perioperative antibiotics.  The patient will be placed on DVT prophylaxis after surgery.      Gene Welsh MD  9/8/2022

## 2022-09-08 NOTE — PLAN OF CARE
Goal Outcome Evaluation:  Plan of Care Reviewed With: patient        Progress: no change  Outcome Evaluation: Patient had some c/o pain that is worse with activity and movement, but declined medication for pain. Bedrest, Q2 turn as much as tolerated. AccuMax pump applied to bed. Purewick to LWS in place. IVF continued. Plan for surgery today once cleared by cardio who should see this morning. Consent for surgery at bedside, patient wanted to wait for daughter to be here before signing. Overall VSS. Will continue to monitor.

## 2022-09-08 NOTE — ANESTHESIA PREPROCEDURE EVALUATION
Anesthesia Evaluation     Patient summary reviewed and Nursing notes reviewed   NPO Solid Status: > 8 hours  NPO Liquid Status: > 2 hours           Airway   Mallampati: III  TM distance: >3 FB  Neck ROM: limited  Possible difficult intubation  Comment: Closed nondisplaced fracture of left lateral portion of seventh cervical vertebra over one year ago  Dental - normal exam     Pulmonary - negative pulmonary ROS and normal exam    breath sounds clear to auscultation  Cardiovascular - normal exam    ECG reviewed  Patient on routine beta blocker  Rhythm: regular  Rate: normal    (+) pacemaker pacemaker, dysrhythmias Paroxysmal Atrial Fib, CHF Diastolic >=55%, DVT resolved, hyperlipidemia,  carotid artery disease carotid bilateral  (-) angina, orthopnea, PND, KELLER    ROS comment: Atrial fibrillation , HR FASTER  Left anterior fascicular block  Abnormal R-wave progression, early transition  LVH with secondary repolarization abnormality    Neuro/Psych  (+) headaches (Migraine), syncope, psychiatric history Anxiety,    GI/Hepatic/Renal/Endo - negative ROS     Musculoskeletal     Abdominal    Substance History - negative use     OB/GYN negative ob/gyn ROS         Other   arthritis,                    Anesthesia Plan    ASA 3     general     intravenous induction     Anesthetic plan, risks, benefits, and alternatives have been provided, discussed and informed consent has been obtained with: patient.        CODE STATUS:    Level Of Support Discussed With: Patient  Code Status (Patient has no pulse and is not breathing): CPR (Attempt to Resuscitate)  Medical Interventions (Patient has pulse or is breathing): Full

## 2022-09-08 NOTE — PROGRESS NOTES
Name: Brandy Han ADMIT: 2022   : 1930  PCP: Rupal Sutton MD    MRN: [id number] LOS: 1 days   AGE/SEX: 89 y.o. female  ROOM: [facility]     Subjective   Subjective   Still with expected pain in her hip.  No new complaints.  No chest pain or palpitations.  No shortness of breath    Review of Systems     Objective   Objective   Vital Signs  Temp:  [97.3 °F (36.3 °C)-98.1 °F (36.7 °C)] 98.1 °F (36.7 °C)  Heart Rate:  [] 103  Resp:  [18-20] 18  BP: ()/() 109/65  SpO2:  [95 %-97 %] 96 %  on  Flow (L/min):  [2] 2;   Device (Oxygen Therapy): room air  Body mass index is 26.66 kg/m².  Physical Exam  Vitals and nursing note reviewed.   Constitutional:       General: She is not in acute distress.     Appearance: Normal appearance.   Neck:      Vascular: No JVD.      Trachea: No tracheal deviation.   Cardiovascular:      Rate and Rhythm: Tachycardia present. Rhythm irregularly irregular.      Heart sounds: Normal heart sounds.   Pulmonary:      Effort: Pulmonary effort is normal. No respiratory distress.      Breath sounds: Normal breath sounds.   Abdominal:      General: Bowel sounds are normal.      Palpations: Abdomen is soft.      Tenderness: There is no abdominal tenderness.   Musculoskeletal:         General: Tenderness and signs of injury present.   Skin:     General: Skin is warm and dry.   Neurological:      General: No focal deficit present.      Mental Status: She is alert and oriented to person, place, and time.   Psychiatric:         Behavior: Behavior normal. Behavior is cooperative.       Results Review     I reviewed the patient's new clinical results.  Results from last 7 days   Lab Units 22  0634 22  1414   WBC 10*3/mm3 8.65 9.64   HEMOGLOBIN g/dL 10.9* 11.9*   PLATELETS 10*3/mm3 125* 153     Results from last 7 days   Lab Units 22  1414   SODIUM mmol/L 137   POTASSIUM mmol/L 4.1   CHLORIDE mmol/L 102   CO2 mmol/L 23.0   BUN mg/dL 22   CREATININE mg/dL  0.87   GLUCOSE mg/dL 110*   EGFR mL/min/1.73 62.6     Results from last 7 days   Lab Units 09/07/22  1414   ALBUMIN g/dL 3.40*   BILIRUBIN mg/dL 1.7*   ALK PHOS U/L 69   AST (SGOT) U/L 24   ALT (SGPT) U/L 8     Results from last 7 days   Lab Units 09/07/22  1414   CALCIUM mg/dL 8.7   ALBUMIN g/dL 3.40*       No results found for: HGBA1C, POCGLU      Scheduled Medications   Infusions  sodium chloride, 75 mL/hr, Last Rate: 75 mL/hr (09/08/22 0623)    Diet  NPO Diet NPO Type: Sips With Meds       Assessment/Plan     Active Hospital Problems    Diagnosis  POA   • **Closed comminuted intertrochanteric fracture of proximal end of left femur (HCC) [S72.142A]  Yes   • Osteoporosis with pathological fracture [M80.00XA]  Yes   • Chronic diastolic congestive heart failure (HCC) [I50.32]  Yes   • Vitamin D deficiency [E55.9]  Yes   • Paroxysmal atrial fibrillation (HCC) [I48.0]  Yes   • B12 deficiency [E53.8]  Yes      Resolved Hospital Problems   No resolved problems to display.       89 y.o. female admitted with Closed comminuted intertrochanteric fracture of proximal end of left femur (HCC).    She is scheduled for surgical repair of her hip fracture this afternoon.  Cardiology consult pending.  She still is in rapid atrial fibrillation but this may be at least partially due to her pain.  She has morphine and Norco available for pain.  She seems compensated from a CHF standpoint.    Her vitamin D level is extremely low.  Will start her on replacement.  B12 also low.  Will start replacement, IM then PO.      · SCDs for DVT prophylaxis.  · Full code.  · Discussed with patient and family.  · Anticipate discharge to SNU facility next week.      Isaias Rasmussen MD  [facility]  09/08/22  07:31 EDT       [Follow-Up Evaluation] : a follow-up evaluation for [Headache] : headache [Mother] : mother [[employer] ] : provided by [employer]   [[email or web address]] : [place]

## 2022-09-09 ENCOUNTER — APPOINTMENT (OUTPATIENT)
Dept: GENERAL RADIOLOGY | Facility: HOSPITAL | Age: 87
End: 2022-09-09

## 2022-09-09 LAB
ANION GAP SERPL CALCULATED.3IONS-SCNC: 11.2 MMOL/L (ref 5–15)
BASOPHILS # BLD AUTO: 0.02 10*3/MM3 (ref 0–0.2)
BASOPHILS NFR BLD AUTO: 0.2 % (ref 0–1.5)
BUN SERPL-MCNC: 22 MG/DL (ref 8–23)
BUN/CREAT SERPL: 28.2 (ref 7–25)
CALCIUM SPEC-SCNC: 8.1 MG/DL (ref 8.2–9.6)
CHLORIDE SERPL-SCNC: 104 MMOL/L (ref 98–107)
CO2 SERPL-SCNC: 19.8 MMOL/L (ref 22–29)
CREAT SERPL-MCNC: 0.78 MG/DL (ref 0.57–1)
DEPRECATED RDW RBC AUTO: 42.1 FL (ref 37–54)
EGFRCR SERPLBLD CKD-EPI 2021: 71.4 ML/MIN/1.73
EOSINOPHIL # BLD AUTO: 0.03 10*3/MM3 (ref 0–0.4)
EOSINOPHIL NFR BLD AUTO: 0.4 % (ref 0.3–6.2)
ERYTHROCYTE [DISTWIDTH] IN BLOOD BY AUTOMATED COUNT: 12 % (ref 12.3–15.4)
GLUCOSE SERPL-MCNC: 101 MG/DL (ref 65–99)
HCT VFR BLD AUTO: 32.9 % (ref 34–46.6)
HGB BLD-MCNC: 10.8 G/DL (ref 12–15.9)
IMM GRANULOCYTES # BLD AUTO: 0.06 10*3/MM3 (ref 0–0.05)
IMM GRANULOCYTES NFR BLD AUTO: 0.7 % (ref 0–0.5)
LYMPHOCYTES # BLD AUTO: 0.89 10*3/MM3 (ref 0.7–3.1)
LYMPHOCYTES NFR BLD AUTO: 10.5 % (ref 19.6–45.3)
MCH RBC QN AUTO: 31.5 PG (ref 26.6–33)
MCHC RBC AUTO-ENTMCNC: 32.8 G/DL (ref 31.5–35.7)
MCV RBC AUTO: 95.9 FL (ref 79–97)
MONOCYTES # BLD AUTO: 0.72 10*3/MM3 (ref 0.1–0.9)
MONOCYTES NFR BLD AUTO: 8.5 % (ref 5–12)
NEUTROPHILS NFR BLD AUTO: 6.74 10*3/MM3 (ref 1.7–7)
NEUTROPHILS NFR BLD AUTO: 79.7 % (ref 42.7–76)
NRBC BLD AUTO-RTO: 0 /100 WBC (ref 0–0.2)
PLATELET # BLD AUTO: 116 10*3/MM3 (ref 140–450)
PMV BLD AUTO: 10.3 FL (ref 6–12)
POTASSIUM SERPL-SCNC: 3.9 MMOL/L (ref 3.5–5.2)
RBC # BLD AUTO: 3.43 10*6/MM3 (ref 3.77–5.28)
SODIUM SERPL-SCNC: 135 MMOL/L (ref 136–145)
WBC NRBC COR # BLD: 8.46 10*3/MM3 (ref 3.4–10.8)

## 2022-09-09 PROCEDURE — 73502 X-RAY EXAM HIP UNI 2-3 VIEWS: CPT

## 2022-09-09 PROCEDURE — 76000 FLUOROSCOPY <1 HR PHYS/QHP: CPT

## 2022-09-09 PROCEDURE — C1713 ANCHOR/SCREW BN/BN,TIS/BN: HCPCS | Performed by: ORTHOPAEDIC SURGERY

## 2022-09-09 PROCEDURE — 25010000002 FENTANYL CITRATE (PF) 50 MCG/ML SOLUTION: Performed by: ANESTHESIOLOGY

## 2022-09-09 PROCEDURE — 25010000002 NEOSTIGMINE 5 MG/10ML SOLUTION: Performed by: ANESTHESIOLOGY

## 2022-09-09 PROCEDURE — 25010000002 PROPOFOL 10 MG/ML EMULSION: Performed by: ANESTHESIOLOGY

## 2022-09-09 PROCEDURE — 25010000002 MORPHINE PER 10 MG: Performed by: ORTHOPAEDIC SURGERY

## 2022-09-09 PROCEDURE — 25010000002 CYANOCOBALAMIN PER 1000 MCG: Performed by: ORTHOPAEDIC SURGERY

## 2022-09-09 PROCEDURE — 85025 COMPLETE CBC W/AUTO DIFF WBC: CPT | Performed by: HOSPITALIST

## 2022-09-09 PROCEDURE — 25010000002 ONDANSETRON PER 1 MG: Performed by: HOSPITALIST

## 2022-09-09 PROCEDURE — 25010000002 CEFAZOLIN IN DEXTROSE 2-4 GM/100ML-% SOLUTION: Performed by: ORTHOPAEDIC SURGERY

## 2022-09-09 PROCEDURE — 73501 X-RAY EXAM HIP UNI 1 VIEW: CPT

## 2022-09-09 PROCEDURE — 0QS706Z REPOSITION LEFT UPPER FEMUR WITH INTRAMEDULLARY INTERNAL FIXATION DEVICE, OPEN APPROACH: ICD-10-PCS | Performed by: ORTHOPAEDIC SURGERY

## 2022-09-09 PROCEDURE — G0463 HOSPITAL OUTPT CLINIC VISIT: HCPCS | Performed by: ORTHOPAEDIC SURGERY

## 2022-09-09 PROCEDURE — 27245 TREAT THIGH FRACTURE: CPT | Performed by: ORTHOPAEDIC SURGERY

## 2022-09-09 PROCEDURE — 25010000002 DIGOXIN PER 500 MCG: Performed by: INTERNAL MEDICINE

## 2022-09-09 PROCEDURE — 80048 BASIC METABOLIC PNL TOTAL CA: CPT | Performed by: ORTHOPAEDIC SURGERY

## 2022-09-09 PROCEDURE — 0 LIDOCAINE 1 % SOLUTION 20 ML VIAL: Performed by: ORTHOPAEDIC SURGERY

## 2022-09-09 DEVICE — TROCHANTERIC NAIL KIT, TI
Type: IMPLANTABLE DEVICE | Site: FEMUR | Status: FUNCTIONAL
Brand: GAMMA

## 2022-09-09 DEVICE — LOCKING SCREW, FULLY THREADED: Type: IMPLANTABLE DEVICE | Site: FEMUR | Status: FUNCTIONAL

## 2022-09-09 DEVICE — LAG SCREW, TI
Type: IMPLANTABLE DEVICE | Site: FEMUR | Status: FUNCTIONAL
Brand: GAMMA

## 2022-09-09 RX ORDER — ACETAMINOPHEN 325 MG/1
325 TABLET ORAL EVERY 4 HOURS PRN
Status: DISCONTINUED | OUTPATIENT
Start: 2022-09-09 | End: 2022-09-16 | Stop reason: HOSPADM

## 2022-09-09 RX ORDER — GLYCOPYRROLATE 0.2 MG/ML
INJECTION INTRAMUSCULAR; INTRAVENOUS AS NEEDED
Status: DISCONTINUED | OUTPATIENT
Start: 2022-09-09 | End: 2022-09-09 | Stop reason: SURG

## 2022-09-09 RX ORDER — SODIUM CHLORIDE, SODIUM LACTATE, POTASSIUM CHLORIDE, CALCIUM CHLORIDE 600; 310; 30; 20 MG/100ML; MG/100ML; MG/100ML; MG/100ML
100 INJECTION, SOLUTION INTRAVENOUS CONTINUOUS
Status: DISCONTINUED | OUTPATIENT
Start: 2022-09-09 | End: 2022-09-14

## 2022-09-09 RX ORDER — DOCUSATE SODIUM 100 MG/1
100 CAPSULE, LIQUID FILLED ORAL 2 TIMES DAILY PRN
Status: DISCONTINUED | OUTPATIENT
Start: 2022-09-09 | End: 2022-09-16 | Stop reason: HOSPADM

## 2022-09-09 RX ORDER — CEFAZOLIN SODIUM 2 G/100ML
2 INJECTION, SOLUTION INTRAVENOUS ONCE
Status: COMPLETED | OUTPATIENT
Start: 2022-09-09 | End: 2022-09-09

## 2022-09-09 RX ORDER — NEOSTIGMINE METHYLSULFATE 0.5 MG/ML
INJECTION, SOLUTION INTRAVENOUS AS NEEDED
Status: DISCONTINUED | OUTPATIENT
Start: 2022-09-09 | End: 2022-09-09 | Stop reason: SURG

## 2022-09-09 RX ORDER — ONDANSETRON 2 MG/ML
4 INJECTION INTRAMUSCULAR; INTRAVENOUS EVERY 6 HOURS PRN
Status: DISCONTINUED | OUTPATIENT
Start: 2022-09-09 | End: 2022-09-16 | Stop reason: HOSPADM

## 2022-09-09 RX ORDER — DIGOXIN 0.25 MG/ML
125 INJECTION INTRAMUSCULAR; INTRAVENOUS
Status: DISCONTINUED | OUTPATIENT
Start: 2022-09-09 | End: 2022-09-15

## 2022-09-09 RX ORDER — CEFAZOLIN SODIUM 2 G/100ML
2 INJECTION, SOLUTION INTRAVENOUS EVERY 8 HOURS
Status: COMPLETED | OUTPATIENT
Start: 2022-09-09 | End: 2022-09-10

## 2022-09-09 RX ORDER — SODIUM CHLORIDE 0.9 % (FLUSH) 0.9 %
3 SYRINGE (ML) INJECTION EVERY 12 HOURS SCHEDULED
Status: DISCONTINUED | OUTPATIENT
Start: 2022-09-09 | End: 2022-09-09 | Stop reason: HOSPADM

## 2022-09-09 RX ORDER — POLYETHYLENE GLYCOL 3350 17 G/17G
17 POWDER, FOR SOLUTION ORAL DAILY
Status: DISCONTINUED | OUTPATIENT
Start: 2022-09-09 | End: 2022-09-16 | Stop reason: HOSPADM

## 2022-09-09 RX ORDER — LIDOCAINE HYDROCHLORIDE 10 MG/ML
0.5 INJECTION, SOLUTION EPIDURAL; INFILTRATION; INTRACAUDAL; PERINEURAL ONCE AS NEEDED
Status: DISCONTINUED | OUTPATIENT
Start: 2022-09-09 | End: 2022-09-09 | Stop reason: HOSPADM

## 2022-09-09 RX ORDER — ONDANSETRON 2 MG/ML
4 INJECTION INTRAMUSCULAR; INTRAVENOUS ONCE AS NEEDED
Status: DISCONTINUED | OUTPATIENT
Start: 2022-09-09 | End: 2022-09-09 | Stop reason: HOSPADM

## 2022-09-09 RX ORDER — FAMOTIDINE 10 MG/ML
20 INJECTION, SOLUTION INTRAVENOUS ONCE
Status: COMPLETED | OUTPATIENT
Start: 2022-09-09 | End: 2022-09-09

## 2022-09-09 RX ORDER — FENTANYL CITRATE 50 UG/ML
50 INJECTION, SOLUTION INTRAMUSCULAR; INTRAVENOUS
Status: DISCONTINUED | OUTPATIENT
Start: 2022-09-09 | End: 2022-09-09 | Stop reason: HOSPADM

## 2022-09-09 RX ORDER — ENOXAPARIN SODIUM 100 MG/ML
1 INJECTION SUBCUTANEOUS EVERY 12 HOURS
Status: DISCONTINUED | OUTPATIENT
Start: 2022-09-10 | End: 2022-09-15

## 2022-09-09 RX ORDER — MAGNESIUM HYDROXIDE 1200 MG/15ML
LIQUID ORAL AS NEEDED
Status: DISCONTINUED | OUTPATIENT
Start: 2022-09-09 | End: 2022-09-09 | Stop reason: HOSPADM

## 2022-09-09 RX ORDER — PROPOFOL 10 MG/ML
VIAL (ML) INTRAVENOUS AS NEEDED
Status: DISCONTINUED | OUTPATIENT
Start: 2022-09-09 | End: 2022-09-09 | Stop reason: SURG

## 2022-09-09 RX ORDER — LIDOCAINE HYDROCHLORIDE 20 MG/ML
INJECTION, SOLUTION INFILTRATION; PERINEURAL AS NEEDED
Status: DISCONTINUED | OUTPATIENT
Start: 2022-09-09 | End: 2022-09-09 | Stop reason: SURG

## 2022-09-09 RX ORDER — SODIUM CHLORIDE, SODIUM LACTATE, POTASSIUM CHLORIDE, CALCIUM CHLORIDE 600; 310; 30; 20 MG/100ML; MG/100ML; MG/100ML; MG/100ML
9 INJECTION, SOLUTION INTRAVENOUS CONTINUOUS
Status: DISCONTINUED | OUTPATIENT
Start: 2022-09-09 | End: 2022-09-14

## 2022-09-09 RX ORDER — SODIUM CHLORIDE 0.9 % (FLUSH) 0.9 %
3-10 SYRINGE (ML) INJECTION AS NEEDED
Status: DISCONTINUED | OUTPATIENT
Start: 2022-09-09 | End: 2022-09-09 | Stop reason: HOSPADM

## 2022-09-09 RX ORDER — ONDANSETRON 4 MG/1
4 TABLET, FILM COATED ORAL EVERY 6 HOURS PRN
Status: DISCONTINUED | OUTPATIENT
Start: 2022-09-09 | End: 2022-09-16 | Stop reason: HOSPADM

## 2022-09-09 RX ORDER — MIDAZOLAM HYDROCHLORIDE 1 MG/ML
0.5 INJECTION INTRAMUSCULAR; INTRAVENOUS
Status: DISCONTINUED | OUTPATIENT
Start: 2022-09-09 | End: 2022-09-09 | Stop reason: HOSPADM

## 2022-09-09 RX ORDER — ROCURONIUM BROMIDE 10 MG/ML
INJECTION, SOLUTION INTRAVENOUS AS NEEDED
Status: DISCONTINUED | OUTPATIENT
Start: 2022-09-09 | End: 2022-09-09 | Stop reason: SURG

## 2022-09-09 RX ADMIN — METOPROLOL TARTRATE 5 MG: 1 INJECTION, SOLUTION INTRAVENOUS at 21:02

## 2022-09-09 RX ADMIN — GLYCOPYRROLATE 0.2 MG: 0.2 INJECTION INTRAMUSCULAR; INTRAVENOUS at 09:18

## 2022-09-09 RX ADMIN — FENTANYL CITRATE 25 MCG: 50 INJECTION INTRAMUSCULAR; INTRAVENOUS at 08:32

## 2022-09-09 RX ADMIN — SODIUM CHLORIDE, POTASSIUM CHLORIDE, SODIUM LACTATE AND CALCIUM CHLORIDE 9 ML/HR: 600; 310; 30; 20 INJECTION, SOLUTION INTRAVENOUS at 07:40

## 2022-09-09 RX ADMIN — CYANOCOBALAMIN 1000 MCG: 1000 INJECTION, SOLUTION INTRAMUSCULAR; SUBCUTANEOUS at 20:05

## 2022-09-09 RX ADMIN — FENTANYL CITRATE 50 MCG: 50 INJECTION INTRAMUSCULAR; INTRAVENOUS at 10:24

## 2022-09-09 RX ADMIN — FENTANYL CITRATE 25 MCG: 50 INJECTION INTRAMUSCULAR; INTRAVENOUS at 08:06

## 2022-09-09 RX ADMIN — FENTANYL CITRATE 50 MCG: 50 INJECTION INTRAMUSCULAR; INTRAVENOUS at 10:05

## 2022-09-09 RX ADMIN — SODIUM CHLORIDE, POTASSIUM CHLORIDE, SODIUM LACTATE AND CALCIUM CHLORIDE 100 ML/HR: 600; 310; 30; 20 INJECTION, SOLUTION INTRAVENOUS at 13:20

## 2022-09-09 RX ADMIN — FENTANYL CITRATE 25 MCG: 50 INJECTION INTRAMUSCULAR; INTRAVENOUS at 08:40

## 2022-09-09 RX ADMIN — DIGOXIN 125 MCG: 0.25 INJECTION INTRAMUSCULAR; INTRAVENOUS at 16:17

## 2022-09-09 RX ADMIN — ONDANSETRON HYDROCHLORIDE 4 MG: 2 SOLUTION INTRAMUSCULAR; INTRAVENOUS at 08:11

## 2022-09-09 RX ADMIN — ROCURONIUM BROMIDE 25 MG: 50 INJECTION INTRAVENOUS at 07:51

## 2022-09-09 RX ADMIN — LIDOCAINE HYDROCHLORIDE 40 MG: 20 INJECTION, SOLUTION INFILTRATION; PERINEURAL at 07:50

## 2022-09-09 RX ADMIN — MORPHINE SULFATE 2 MG: 2 INJECTION, SOLUTION INTRAMUSCULAR; INTRAVENOUS at 13:32

## 2022-09-09 RX ADMIN — FAMOTIDINE 20 MG: 10 INJECTION, SOLUTION INTRAVENOUS at 07:00

## 2022-09-09 RX ADMIN — FENTANYL CITRATE 50 MCG: 50 INJECTION INTRAMUSCULAR; INTRAVENOUS at 07:00

## 2022-09-09 RX ADMIN — CEFAZOLIN SODIUM 2 G: 2 INJECTION, SOLUTION INTRAVENOUS at 16:18

## 2022-09-09 RX ADMIN — METOPROLOL TARTRATE 5 MG: 1 INJECTION, SOLUTION INTRAVENOUS at 16:17

## 2022-09-09 RX ADMIN — PROPOFOL 100 MG: 10 INJECTION, EMULSION INTRAVENOUS at 07:50

## 2022-09-09 RX ADMIN — CEFAZOLIN SODIUM 2 G: 2 INJECTION, SOLUTION INTRAVENOUS at 07:38

## 2022-09-09 RX ADMIN — CEFAZOLIN SODIUM 2 G: 2 INJECTION, SOLUTION INTRAVENOUS at 23:32

## 2022-09-09 RX ADMIN — FENTANYL CITRATE 50 MCG: 50 INJECTION INTRAMUSCULAR; INTRAVENOUS at 09:48

## 2022-09-09 RX ADMIN — NEOSTIGMINE METHYLSULFATE 3 MG: 0.5 INJECTION INTRAVENOUS at 09:18

## 2022-09-09 RX ADMIN — MORPHINE SULFATE 2 MG: 2 INJECTION, SOLUTION INTRAMUSCULAR; INTRAVENOUS at 16:33

## 2022-09-09 NOTE — OP NOTE
Orthopaedic Operative Note    Facility: [facility]    Patient: Brandy Han     Medical Record Number: [id number]     YOB: 1930     Dictating Surgeon: Gene Welsh MD       Primary Care Physician: Rupal Sutton MD     Date of Operation: 9/9/2022     PREOPERATIVE DIAGNOSIS:  Left intertrochanteric femur fracture.       POSTOPERATIVE DIAGNOSIS: Left intertrochanteric femur fracture.       PROCEDURE PERFORMED: Cephalomedullary fixation of left intertrochanteric femur fracture      SURGEON: Geen Welsh MD      ASSISTANT: none      ANESTHESIA: General.       SPECIMENS: None.       COMPLICATIONS: None.       ESTIMATED BLOOD LOSS: Less than 50 mL.      IMPLANTS:     Implant Name Type Inv. Item Serial No.  Lot No. LRB No. Used Action   NAIL FEM CHELLE/180KT TI 125D [id number] STRL - [id number] Implant NAIL FEM CHELLE/180KT TI 125D [id number] STRL  KOLE ZECHARIAH [id number] Left 1 Implanted   Gamma3 ADAPT Clip    KOLE [id number] Left 1 Implanted   SCRW LK FUL/THRD 5X40MM STRL - [id number] Implant SCRW LK FUL/THRD 5X40MM STRL  KOLE ZECHARIAH [id number] Left 1 Implanted   SCRW LAG GAM3 TI [id number] STRL - [id number] Implant SCRW LAG GAM3 TI [id number] STRL  KOLE ZECHARIAH [id number] Left 1 Implanted       1. [place] Gamma nail size 11 by 180 with a 125 degree neck shaft angle with 105 mm proximal compression screw and associated de-rotation screw     2. Size 40 mm distal interlock screw.     INDICATIONS: The patient is a 89 y.o. female who sustained an intertrochanteric femur fracture. The risks, benefits, and alternatives to cephalomedullary fixation were discussed with the patient in detail including infection, wound healing problems, hematoma, nonunion, malunion, failure of fixation, cutout of the compression screw, positioning related injury or neuropraxia, iatrogenic injury to major nerves or blood vessels potentially resulting in permanent dysfunction or deficits, DVT, PE, and anesthesia  related complications resulting in death or the need for further surgery. The patient acknowledged understanding of information and consented to proceed.     DESCRIPTION OF PROCEDURE: The patient and operative site were identified in the preoperative holding area. The surgical site was marked. Following this, the patient was taken to the operating room and placed in the supine position. Adequate general anesthesia was administered and then patient was repositioned on the operating table on the [place] bed. Timeout was taken. Preoperative antibiotics were administered. Following this, the nonoperative leg was placed in a well-padded well leg avila. The operative leg was placed into traction. A combination of slight traction, adduction, and internal rotation were utilized to perform a closed reduction of the hip. I confirmed this fluoroscopically in both the AP and lateral planes. Once I was satisfied that the hip was sufficiently well reduced, the hip was then prepped and draped in a standard sterile fashion. I cleaned the extremity with an alcohol solution. A Hibiclens scrub was performed and the extremity was prepped with ChloraPrep x2. I allowed those to dry for 3 minutes before the extremity was draped.     Next, an approximately 3 cm incision was fashioned just proximal to the tip of the greater trochanter. I carefully dissected down through skin and subcutaneous tissues. The fascia was split in line. A guide pin was guided down to the tip of the trochanter and then driven down the intramedullary canal to the level of the lesser under direct fluoroscopic guidance in both the AP and lateral planes. Once I had the guide pin in good position, I reamed an opening.  I felt a short 11 x 180 mm nail would be appropriate.  The nail was together on the back table.  This was impacted down into position. The guidewire was removed. I directed my attention to placement of the proximal compression screw. A separate small  incision was fashioned along the lateral aspect of the upper thigh. The guide for the pin was abutted directly adjacent to the bone and then the guide pin for the compression screw was carefully guided up into the center-center position of the femoral head taking care to maintain a tip to apex distance of as little as possible in the combined AP and lateral planes. Once we had the guide pin in good position and that was confirmed radiographically, I reamed up into the head and placed the 105 mm compression screw which seemed to get excellent purchase in the bone and seat well. The de-rotation screw was applied and maximally tightened.  Tip to apex distance was less than 25 mm      Once I had confirmed the positioning of the hardware in both the AP and lateral planes, I then directed my attention to placement of a distal interlock screw using a guide and a small percutaneous incision. The distal interlock screw was drilled, measured, and placed without complication and seemed to get good purchase in the bone. It was confirmed to traverse the nail.      Having completed the placement of the hardware, final images were taken and saved for later review. I was satisfied we had an excellent reduction of the fracture. The hardware appeared to be well-positioned. I therefore directed my attention to closure. Final images were taken and saved. The wounds were copiously irrigated with sterile saline. The wounds were sequentially closed in a layered fashion using Monocryl for the deep tissues and staples for the skin. Sterile dressings were applied to wound and drapes withdrawn. The patient was awakened, transferred to a standard bed, and taken to the recovery room. The patient tolerated the procedure well. There were no complications. The patient was taken to the recovery room in good condition.      Gene Welsh MD    Cc: Rupal Sutton MD  9/9/2022

## 2022-09-09 NOTE — PROGRESS NOTES
Patient was in the OR when I went to see her.  Nurse reports now she is on the floor her mouth is dry and she is having difficulty with medication.  I will give her dose of IV digoxin and schedule IV metoprolol and hopefully transition back to p.o. in the morning if she is awake and swallowing well.

## 2022-09-09 NOTE — DISCHARGE PLACEMENT REQUEST
"Brandy Rodriguez (89 y.o. Female)             Date of Birth   06/01/1930    Social Security Number       Address   [address] [address]    Home Phone   [phone]    MRN   [id number]       Gnosticism   Sabianism    Marital Status                               Admission Date   9/7/22    Admission Type   Elective    Admitting Provider   Isaias Rasmussen MD    Attending Provider   Frankie Olson MD    Department, Room/Bed   [address], [address]/1       Discharge Date       Discharge Disposition       Discharge Destination                               Attending Provider: Frankie Olson MD    Allergies: No Known Allergies    Isolation: None   Infection: None   Code Status: CPR   Advance Care Planning Activity    Ht: 170.2 cm (67\")   Wt: 77 kg (169 lb 12.1 oz)    Admission Cmt: None   Principal Problem: Closed comminuted intertrochanteric fracture of proximal end of left femur (HCC) [S72.142A]                 Active Insurance as of 9/7/2022     Primary Coverage     Payor Plan Insurance Group Employer/Plan Group    MEDICARE MEDICARE A & B      Payor Plan Address Payor Plan Phone Number Payor Plan Fax Number Effective Dates    [address] [phone]  5/1/1995 - None Entered    [facility] [id number]       Subscriber Name Subscriber Birth Date Member ID       BRANDY RODRIGUEZ 6/1/1930 [id number]           Secondary Coverage     Payor Plan Insurance Group Employer/Plan Group    ANTHEM BLUE CROSS [facility] SUPP [id number]     Payor Plan Address Payor Plan Phone Number Payor Plan Fax Number Effective Dates    [address]   12/1/2016 - None Entered    [facility] [id number]       Subscriber Name Subscriber Birth Date Member ID       BRANDY RODRIGUEZ 6/1/1930 [id number]                 Emergency Contacts      (Rel.) Home Phone Work Phone Mobile Phone    Lisa Jacques (Daughter) -- -- [phone]              "

## 2022-09-09 NOTE — PROGRESS NOTES
Name: Brandy Han ADMIT: 2022   : 1930  PCP: Rupal Sutton MD    MRN: [id number] LOS: 2 days   AGE/SEX: 89 y.o. female  ROOM: [facility]     Subjective   Subjective   Post op pain is expected. Dry mouth. HR improved  No chest pain or palpitations.  No shortness of breath    Review of Systems     Objective   Objective   Vital Signs  Temp:  [97.5 °F (36.4 °C)-99.1 °F (37.3 °C)] 99.1 °F (37.3 °C)  Heart Rate:  [] 89  Resp:  [12-20] 16  BP: (109-145)/(59-99) 129/73  FiO2 (%):  [100 %] 100 %  SpO2:  [94 %-99 %] 98 %  on  Flow (L/min):  [2-4] 2;   Device (Oxygen Therapy): nasal cannula  Body mass index is 26.59 kg/m².  Physical Exam  Vitals and nursing note reviewed.   Constitutional:       General: She is not in acute distress.     Appearance: Normal appearance.   Neck:      Vascular: No JVD.      Trachea: No tracheal deviation.   Cardiovascular:      Rate and Rhythm: Normal rate. Rhythm irregularly irregular.      Heart sounds: Normal heart sounds.   Pulmonary:      Effort: Pulmonary effort is normal. No respiratory distress.      Breath sounds: Normal breath sounds.   Abdominal:      General: Bowel sounds are normal.      Palpations: Abdomen is soft.      Tenderness: There is no abdominal tenderness.   Musculoskeletal:         General: Tenderness and signs of injury present.   Skin:     General: Skin is warm and dry.   Neurological:      General: No focal deficit present.      Mental Status: She is alert and oriented to person, place, and time.   Psychiatric:         Behavior: Behavior normal. Behavior is cooperative.       Results Review     I reviewed the patient's new clinical results.  Results from last 7 days   Lab Units 22  1145 22  0634 22  1414   WBC 10*3/mm3 8.46 8.65 9.64   HEMOGLOBIN g/dL 10.8* 10.9* 11.9*   PLATELETS 10*3/mm3 116* 125* 153     Results from last 7 days   Lab Units 22  1145 22  0634 22  1414   SODIUM mmol/L 135* 136 137   POTASSIUM  mmol/L 3.9 4.1 4.1   CHLORIDE mmol/L 104 106 102   CO2 mmol/L 19.8* 20.0* 23.0   BUN mg/dL 22 21 22   CREATININE mg/dL 0.78 0.69 0.87   GLUCOSE mg/dL 101* 85 110*   EGFR mL/min/1.73 71.4 81.5 62.6     Results from last 7 days   Lab Units 09/07/22  1414   ALBUMIN g/dL 3.40*   BILIRUBIN mg/dL 1.7*   ALK PHOS U/L 69   AST (SGOT) U/L 24   ALT (SGPT) U/L 8     Results from last 7 days   Lab Units 09/09/22  1145 09/08/22  0634 09/07/22  1414   CALCIUM mg/dL 8.1* 8.0* 8.7   ALBUMIN g/dL  --   --  3.40*       No results found for: HGBA1C, POCGLU      Scheduled Medications  atorvastatin, 20 mg, Oral, Daily  ceFAZolin, 2 g, Intravenous, Q8H  cyanocobalamin, 1,000 mcg, Intramuscular, Daily  digoxin, 125 mcg, Intravenous, Daily  metoprolol tartrate, 5 mg, Intravenous, Q6H  polyethylene glycol, 17 g, Oral, Daily  [START ON 9/13/2022] vitamin B-12, 1,000 mcg, Oral, Daily  vitamin D, 50,000 Units, Oral, Q7 Days    Infusions  lactated ringers, 9 mL/hr  lactated ringers, 9 mL/hr, Last Rate: 100 mL/hr (09/09/22 0744)  lactated ringers, 100 mL/hr, Last Rate: 100 mL/hr (09/09/22 1320)    Diet  Diet Regular       Assessment/Plan     Active Hospital Problems    Diagnosis  POA   • **Closed comminuted intertrochanteric fracture of proximal end of left femur (HCC) [S72.142A]  Yes   • Osteoporosis with pathological fracture [M80.00XA]  Yes   • Chronic diastolic congestive heart failure (HCC) [I50.32]  Yes   • Vitamin D deficiency [E55.9]  Yes   • Paroxysmal atrial fibrillation (HCC) [I48.0]  Yes   • B12 deficiency [E53.8]  Yes      Resolved Hospital Problems   No resolved problems to display.       89 y.o. female admitted with Closed comminuted intertrochanteric fracture of proximal end of left femur (HCC).  Status post- Cephalomedullary fixation of left intertrochanteric femur fracture   She has morphine and Norco available for pain.  Monitor labs postop.  Encourage I-S.   Lovenox therapeutic   PT OT eval     She has no prior ischemic  cardiac history but she has presented in rapid atrial fibrillation.Cardiology following.  IV digoxin and IV metoprolol given since she is having a hard time swallowing her meds.     vitamin D level extremely low/ B12 low.  Started on replacement.  Started on B12 IV then will transition to p.o. replacement.      · SCDs for DVT prophylaxis.  · Full code.  · Discussed with patient and family.  · Anticipate discharge to SNU facility next week.      SHERITA Fernandez  [facility]  09/09/22  15:14 EDT

## 2022-09-09 NOTE — CASE MANAGEMENT/SOCIAL WORK
Continued Stay Note  [facility]     Patient Name: Brandy Han  MRN: [id number]  Today's Date: 9/9/2022    Admit Date: 9/7/2022     Discharge Plan     Row Name 09/09/22 1349       Plan    Plan SNF- referrals pending    Patient/Family in Agreement with Plan yes    Plan Comments Spoke with daughter, Lisa Jacques.  Answered questions related to SNF.  Per daughter request, referrals sent to [facility], [facility], [facility], [facility] and [facility].  Transfer packet in [facility].  CCP will follow up on Monday. Jennifer Diggs RN               Discharge Codes    No documentation.               Expected Discharge Date and Time     Expected Discharge Date Expected Discharge Time    Sep 13, 2022             Jennifer Diggs RN

## 2022-09-09 NOTE — ANESTHESIA POSTPROCEDURE EVALUATION
"Patient: Brandy Han    Procedure Summary     Date: 09/09/22 Room / Location: [facility] OR [address] [place] [address] MAIN OR    Anesthesia Start: 0744 Anesthesia Stop: 0936    Procedure: LT. HIP NAIL (Left Thigh) Diagnosis:     Surgeons: Gene Welsh MD Provider: Juan Santiago MD    Anesthesia Type: general ASA Status: 3          Anesthesia Type: general    Vitals  Vitals Value Taken Time   /66 09/09/22 1101   Temp 37.1 °C (98.8 °F) 09/09/22 1100   Pulse 95 09/09/22 1102   Resp 14 09/09/22 1100   SpO2 100 % 09/09/22 1102   Vitals shown include unvalidated device data.        Post Anesthesia Care and Evaluation    Patient participation: complete - patient participated  Level of consciousness: confused  Pain management: adequate    Airway patency: patent  Anesthetic complications: No anesthetic complications  PONV Status: none  Cardiovascular status: acceptable  Respiratory status: acceptable  Hydration status: acceptable    Comments: /73 (BP Location: Right arm, Patient Position: Lying)   Pulse 89   Temp 37.3 °C (99.1 °F) (Oral)   Resp 16   Ht 170.2 cm (67\")   Wt 77 kg (169 lb 12.1 oz)   SpO2 98%   BMI 26.59 kg/m²         "

## 2022-09-09 NOTE — ANESTHESIA PROCEDURE NOTES
Airway  Urgency: elective    Date/Time: 9/9/2022 7:58 AM    General Information and Staff    Patient location during procedure: OR  Anesthesiologist: Juan Santiago MD    Indications and Patient Condition  Indications for airway management: airway protection    Preoxygenated: yes  MILS maintained throughout  Mask difficulty assessment: 1 - vent by mask    Final Airway Details  Final airway type: endotracheal airway      Successful airway: ETT  Cuffed: yes   Successful intubation technique: video laryngoscopy  Facilitating devices/methods: intubating stylet  Endotracheal tube insertion site: oral  Blade: Krishna  Blade size: D  ETT size (mm): 7.0  Cormack-Lehane Classification: grade III - view of epiglottis only  Placement verified by: chest auscultation   Measured from: lips  ETT/EBT  to lips (cm): 21  Number of attempts at approach: 3 or more  Assessment: lips, teeth, and gum same as pre-op and atraumatic intubation

## 2022-09-10 LAB
ANION GAP SERPL CALCULATED.3IONS-SCNC: 9 MMOL/L (ref 5–15)
BASOPHILS # BLD AUTO: 0.02 10*3/MM3 (ref 0–0.2)
BASOPHILS NFR BLD AUTO: 0.3 % (ref 0–1.5)
BUN SERPL-MCNC: 21 MG/DL (ref 8–23)
BUN/CREAT SERPL: 29.6 (ref 7–25)
CALCIUM SPEC-SCNC: 7.8 MG/DL (ref 8.2–9.6)
CHLORIDE SERPL-SCNC: 101 MMOL/L (ref 98–107)
CO2 SERPL-SCNC: 23 MMOL/L (ref 22–29)
CREAT SERPL-MCNC: 0.71 MG/DL (ref 0.57–1)
DEPRECATED RDW RBC AUTO: 42.5 FL (ref 37–54)
EGFRCR SERPLBLD CKD-EPI 2021: 79.9 ML/MIN/1.73
EOSINOPHIL # BLD AUTO: 0.13 10*3/MM3 (ref 0–0.4)
EOSINOPHIL NFR BLD AUTO: 2 % (ref 0.3–6.2)
ERYTHROCYTE [DISTWIDTH] IN BLOOD BY AUTOMATED COUNT: 12.1 % (ref 12.3–15.4)
GLUCOSE SERPL-MCNC: 93 MG/DL (ref 65–99)
HCT VFR BLD AUTO: 26.8 % (ref 34–46.6)
HGB BLD-MCNC: 8.9 G/DL (ref 12–15.9)
IMM GRANULOCYTES # BLD AUTO: 0.04 10*3/MM3 (ref 0–0.05)
IMM GRANULOCYTES NFR BLD AUTO: 0.6 % (ref 0–0.5)
LYMPHOCYTES # BLD AUTO: 1.59 10*3/MM3 (ref 0.7–3.1)
LYMPHOCYTES NFR BLD AUTO: 24 % (ref 19.6–45.3)
MCH RBC QN AUTO: 32.1 PG (ref 26.6–33)
MCHC RBC AUTO-ENTMCNC: 33.2 G/DL (ref 31.5–35.7)
MCV RBC AUTO: 96.8 FL (ref 79–97)
MONOCYTES # BLD AUTO: 0.84 10*3/MM3 (ref 0.1–0.9)
MONOCYTES NFR BLD AUTO: 12.7 % (ref 5–12)
NEUTROPHILS NFR BLD AUTO: 4 10*3/MM3 (ref 1.7–7)
NEUTROPHILS NFR BLD AUTO: 60.4 % (ref 42.7–76)
NRBC BLD AUTO-RTO: 0 /100 WBC (ref 0–0.2)
PLATELET # BLD AUTO: 95 10*3/MM3 (ref 140–450)
PMV BLD AUTO: 10.4 FL (ref 6–12)
POTASSIUM SERPL-SCNC: 4 MMOL/L (ref 3.5–5.2)
RBC # BLD AUTO: 2.77 10*6/MM3 (ref 3.77–5.28)
SODIUM SERPL-SCNC: 133 MMOL/L (ref 136–145)
WBC NRBC COR # BLD: 6.62 10*3/MM3 (ref 3.4–10.8)

## 2022-09-10 PROCEDURE — 85025 COMPLETE CBC W/AUTO DIFF WBC: CPT | Performed by: ORTHOPAEDIC SURGERY

## 2022-09-10 PROCEDURE — 99232 SBSQ HOSP IP/OBS MODERATE 35: CPT | Performed by: INTERNAL MEDICINE

## 2022-09-10 PROCEDURE — 97162 PT EVAL MOD COMPLEX 30 MIN: CPT

## 2022-09-10 PROCEDURE — 25010000002 ENOXAPARIN PER 10 MG: Performed by: ORTHOPAEDIC SURGERY

## 2022-09-10 PROCEDURE — 97530 THERAPEUTIC ACTIVITIES: CPT

## 2022-09-10 PROCEDURE — 99024 POSTOP FOLLOW-UP VISIT: CPT | Performed by: ORTHOPAEDIC SURGERY

## 2022-09-10 PROCEDURE — 25010000002 MORPHINE PER 10 MG: Performed by: ORTHOPAEDIC SURGERY

## 2022-09-10 PROCEDURE — 25010000002 CYANOCOBALAMIN PER 1000 MCG: Performed by: ORTHOPAEDIC SURGERY

## 2022-09-10 PROCEDURE — 25010000002 DIGOXIN PER 500 MCG: Performed by: INTERNAL MEDICINE

## 2022-09-10 PROCEDURE — 80048 BASIC METABOLIC PNL TOTAL CA: CPT | Performed by: ORTHOPAEDIC SURGERY

## 2022-09-10 RX ORDER — ERGOCALCIFEROL 1.25 MG/1
50000 CAPSULE ORAL
Qty: 5 CAPSULE
Start: 2022-09-16

## 2022-09-10 RX ADMIN — POLYETHYLENE GLYCOL 3350 17 G: 17 POWDER, FOR SOLUTION ORAL at 09:59

## 2022-09-10 RX ADMIN — MORPHINE SULFATE 2 MG: 2 INJECTION, SOLUTION INTRAMUSCULAR; INTRAVENOUS at 05:20

## 2022-09-10 RX ADMIN — CYANOCOBALAMIN 1000 MCG: 1000 INJECTION, SOLUTION INTRAMUSCULAR; SUBCUTANEOUS at 09:59

## 2022-09-10 RX ADMIN — DIGOXIN 125 MCG: 0.25 INJECTION INTRAMUSCULAR; INTRAVENOUS at 12:20

## 2022-09-10 RX ADMIN — METOPROLOL TARTRATE 5 MG: 1 INJECTION, SOLUTION INTRAVENOUS at 04:27

## 2022-09-10 RX ADMIN — METOPROLOL TARTRATE 5 MG: 1 INJECTION, SOLUTION INTRAVENOUS at 21:14

## 2022-09-10 RX ADMIN — METOPROLOL TARTRATE 5 MG: 1 INJECTION, SOLUTION INTRAVENOUS at 09:59

## 2022-09-10 RX ADMIN — MORPHINE SULFATE 2 MG: 2 INJECTION, SOLUTION INTRAMUSCULAR; INTRAVENOUS at 21:15

## 2022-09-10 RX ADMIN — ENOXAPARIN SODIUM 80 MG: 100 INJECTION SUBCUTANEOUS at 10:00

## 2022-09-10 RX ADMIN — ENOXAPARIN SODIUM 80 MG: 100 INJECTION SUBCUTANEOUS at 21:15

## 2022-09-10 RX ADMIN — METOPROLOL TARTRATE 5 MG: 1 INJECTION, SOLUTION INTRAVENOUS at 16:32

## 2022-09-10 NOTE — PLAN OF CARE
Goal Outcome Evaluation:  Plan of Care Reviewed With: patient           Outcome Evaluation: Pt is a 89 yo F who is post-op L hip IT nailing s/p fall with hip fracture. Pt presents to PT with impaired functional mobitiy and gait secondary to generalized weakness, impaired balance, and decreased activity tolerance. Pt may benefit from skilled PT to address strength, mobility, and gait.

## 2022-09-10 NOTE — PLAN OF CARE
Goal Outcome Evaluation: Patient POD #1 from femur fracture surgery. Dressings in place.  PT did see patient today.  Purewick in place. Sodium and Calcium low and MD notified.  Patient continues to complain of soreness to throat; MD and surgery aware of this. Refused lunch and dinner due to sore throat. All needs met and call light in reach.

## 2022-09-10 NOTE — PROGRESS NOTES
Name: Brandy Han ADMIT: 2022   : 1930  PCP: Rupal Sutton MD    MRN: [id number] LOS: 3 days   AGE/SEX: 89 y.o. female  ROOM: [facility]     Subjective   Subjective   Patient seen at bedside.       Objective   Objective   Vital Signs  Temp:  [98.3 °F (36.8 °C)-99.1 °F (37.3 °C)] 98.6 °F (37 °C)  Heart Rate:  [79-93] 92  Resp:  [16-18] 18  BP: (111-129)/(64-76) 111/64  SpO2:  [98 %-100 %] 99 %  on  Flow (L/min):  [2] 2;   Device (Oxygen Therapy): nasal cannula  Body mass index is 26.59 kg/m².  Physical Exam   General, awake and alert.  Head and ENT, normocephalic and atraumatic.  Lungs, symmetric expansion, equal air entry bilaterally.  Heart, irregularly irregular  Abdomen, soft and nontender.  Extremities, no clubbing or cyanosis.  Neuro, no focal deficits.  Skin: Warm and no rash.  Psych, normal mood and affect.  Musculoskeletal, joint examination is grossly normal.      Results Review     I reviewed the patient's new clinical results.  Results from last 7 days   Lab Units 09/10/22  1001 22  1145 22  0634 22  1414   WBC 10*3/mm3 6.62 8.46 8.65 9.64   HEMOGLOBIN g/dL 8.9* 10.8* 10.9* 11.9*   PLATELETS 10*3/mm3 95* 116* 125* 153     Results from last 7 days   Lab Units 09/10/22  1001 22  1145 22  0634 22  1414   SODIUM mmol/L 133* 135* 136 137   POTASSIUM mmol/L 4.0 3.9 4.1 4.1   CHLORIDE mmol/L 101 104 106 102   CO2 mmol/L 23.0 19.8* 20.0* 23.0   BUN mg/dL    CREATININE mg/dL 0.71 0.78 0.69 0.87   GLUCOSE mg/dL 93 101* 85 110*   EGFR mL/min/1.73 79.9 71.4 81.5 62.6     Results from last 7 days   Lab Units 22  1414   ALBUMIN g/dL 3.40*   BILIRUBIN mg/dL 1.7*   ALK PHOS U/L 69   AST (SGOT) U/L 24   ALT (SGPT) U/L 8     Results from last 7 days   Lab Units 09/10/22  1001 22  1145 22  0634 22  1414   CALCIUM mg/dL 7.8* 8.1* 8.0* 8.7   ALBUMIN g/dL  --   --   --  3.40*       No results found for: HGBA1C, POCGLU    XR Hip 1 View  Without Pelvis Left (Surgery Only)    Result Date: 9/9/2022  1. Satisfactory postoperative appearance of the left hip.  This report was finalized on 9/9/2022 10:08 AM by Dr. Ander Cast M.D.      Scheduled Medications  atorvastatin, 20 mg, Oral, Daily  cyanocobalamin, 1,000 mcg, Intramuscular, Daily  digoxin, 125 mcg, Intravenous, Daily  enoxaparin, 1 mg/kg, Subcutaneous, Q12H  metoprolol tartrate, 5 mg, Intravenous, Q6H  polyethylene glycol, 17 g, Oral, Daily  [START ON 9/13/2022] vitamin B-12, 1,000 mcg, Oral, Daily  vitamin D, 50,000 Units, Oral, Q7 Days    Infusions  lactated ringers, 9 mL/hr  lactated ringers, 9 mL/hr, Last Rate: 100 mL/hr (09/09/22 0744)  lactated ringers, 100 mL/hr, Last Rate: 100 mL/hr (09/09/22 1320)    Diet  Diet Regular       Assessment/Plan     Active Hospital Problems    Diagnosis  POA   • **Closed comminuted intertrochanteric fracture of proximal end of left femur (HCC) [S72.142A]  Yes   • Osteoporosis with pathological fracture [M80.00XA]  Yes   • Chronic diastolic congestive heart failure (HCC) [I50.32]  Yes   • Vitamin D deficiency [E55.9]  Yes   • Paroxysmal atrial fibrillation (HCC) [I48.0]  Yes   • B12 deficiency [E53.8]  Yes      Resolved Hospital Problems   No resolved problems to display.       89 y.o. female admitted with Closed comminuted intertrochanteric fracture of proximal end of left femur (HCC).    Assessment and plan:  1.  Closed comminuted intertrochanteric fracture of proximal end of left femur, status post cephalomedullary fixation of left hip fracture, orthopedics on board.  Continue weightbearing as tolerated.  Encourage ambulation with physical therapy.    2.  History of osteoporosis, patient on vitamin D supplementation.    3.  Paroxysmal atrial fibrillation, patient is seen by cardiology, she is rate controlled, she is on IV digoxin as well as full dose Lovenox.    4.  Hyperlipidemia, continue statin therapy.    5.  CODE STATUS is full code.  Further  plans based on hospital course.  Patient will need rehab placement upon discharge.    Frankie Olson MD  [facility]  09/10/22  12:58 EDT

## 2022-09-10 NOTE — THERAPY EVALUATION
Patient Name: Brandy Han  : 1930    MRN: [id number]                              Today's Date: 9/10/2022       Admit Date: 2022    Visit Dx:     ICD-10-CM ICD-9-CM   1. Closed comminuted intertrochanteric fracture of left femur, initial encounter ([facility])  S72.142A 820.21   2. Atrial fibrillation with RVR ([facility])  I48.91 427.31     Patient Active Problem List   Diagnosis   • History of venous thrombosis   • Bilateral carotid artery stenosis   • Osteoporosis   • Hyperlipidemia   • Right-sided low back pain with right-sided sciatica   • Sinus arrest   • Presence of cardiac pacemaker   • B12 deficiency   • Chronic pain of both knees   • Non-traumatic rhabdomyolysis   • Closed traumatic nondisplaced fracture of sixth cervical vertebra ([facility])   • Closed nondisplaced fracture of left lateral portion of seventh cervical vertebra ([facility])   • Vitamin D deficiency   • Paroxysmal atrial fibrillation ([facility])   • Overactive bladder   • Closed comminuted intertrochanteric fracture of proximal end of left femur ([facility])   • Osteoporosis with pathological fracture   • Chronic diastolic congestive heart failure ([facility])     Past Medical History:   Diagnosis Date   • Anxiety    • Arterial thrombosis ([facility])    • Arthritis    • Atrial fibrillation ([facility])    • Headache    • Heart palpitations    • Migraine    • Syncope    • UTI (urinary tract infection)      Past Surgical History:   Procedure Laterality Date   • ANKLE SURGERY     • CARDIAC PACEMAKER PLACEMENT     • OTHER SURGICAL HISTORY Left     elbow surgery   • PACEMAKER IMPLANTATION        General Information     Row Name 09/10/22 1635          Physical Therapy Time and Intention    Document Type evaluation  -     Mode of Treatment individual therapy;physical therapy  -     Row Name 09/10/22 1635          General Information    Prior Level of Function independent:;gait;transfer;bed mobility  walks with a walker  -     Existing Precautions/Restrictions fall;left;hip  -      Barriers to Rehab medically complex  -     Row Name 09/10/22 1635          Living Environment    People in Home alone  -     Row Name 09/10/22 1635          Home Main Entrance    Number of Stairs, Main Entrance one  -     Row Name 09/10/22 1635          Cognition    Orientation Status (Cognition) oriented x 3  -     Row Name 09/10/22 1635          Safety Issues, Functional Mobility    Impairments Affecting Function (Mobility) balance;pain;range of motion (ROM);endurance/activity tolerance;strength  -           User Key  (r) = Recorded By, (t) = Taken By, (c) = Cosigned By    Initials Name Provider Type     Priyanka Garcia, PT Physical Therapist               Mobility     Row Name 09/10/22 1637          Bed Mobility    Bed Mobility supine-sit;sit-supine  -     Supine-Sit [place] (Bed Mobility) verbal cues;nonverbal cues (demo/gesture);maximum assist (25% patient effort)  -     Sit-Supine [place] (Bed Mobility) verbal cues;nonverbal cues (demo/gesture);maximum assist (25% patient effort)  -     Assistive Device (Bed Mobility) bed rails;draw sheet;head of bed elevated  -     Comment, (Bed Mobility) pt sat EOB with min to modA for sitting balance, pt with right lean secondary to L hip pain  -     Row Name 09/10/22 1637          Transfers    Comment, (Transfers) unable to attempt this date  -     Row Name 09/10/22 1641          Mobility    Extremity Weight-bearing Status left lower extremity  -     Left Lower Extremity (Weight-bearing Status) weight-bearing as tolerated (WBAT)  -           User Key  (r) = Recorded By, (t) = Taken By, (c) = Cosigned By    Initials Name Provider Type     Priaynka Garcia, PT Physical Therapist               Obj/Interventions     Row Name 09/10/22 1637          Range of Motion Comprehensive    Comment, General Range of Motion L LE ROM limited post-op  -     Row Name 09/10/22 1637          Strength Comprehensive (MMT)    Comment, General Manual  Muscle Testing (MMT) Assessment generalized weakness noted with functional mobility. L LE post-op weakness noted (grossly 2/5)  -     Row Name 09/10/22 1637          Motor Skills    Therapeutic Exercise --  5 reps B LE AP and LAQ  -     Row Name 09/10/22 1637          Balance    Balance Assessment sitting dynamic balance;sitting static balance  -     Static Sitting Balance verbal cues;non-verbal cues (demo/gesture);minimal assist  -     Dynamic Sitting Balance verbal cues;non-verbal cues (demo/gesture);moderate assist  -CH           User Key  (r) = Recorded By, (t) = Taken By, (c) = Cosigned By    Initials Name Provider Type    Priyanka Hussein, PT Physical Therapist               Goals/Plan     Row Name 09/10/22 1641          Bed Mobility Goal 1 (PT)    Activity/Assistive Device (Bed Mobility Goal 1, PT) bed mobility activities, all  -CH     Time Frame (Bed Mobility Goal 1, PT) 1 week  -     Row Name 09/10/22 1641          Transfer Goal 1 (PT)    Activity/Assistive Device (Transfer Goal 1, PT) transfers, all;walker, rolling  -CH     Hayes Level/Cues Needed (Transfer Goal 1, PT) contact guard required  -CH     Time Frame (Transfer Goal 1, PT) 1 week  -     Row Name 09/10/22 1641          Gait Training Goal 1 (PT)    Activity/Assistive Device (Gait Training Goal 1, PT) gait (walking locomotion);walker, rolling  -CH     Hayes Level (Gait Training Goal 1, PT) contact guard required  -CH     Distance (Gait Training Goal 1, PT) 50  -CH     Time Frame (Gait Training Goal 1, PT) 1 week  -     Row Name 09/10/22 1641          Therapy Assessment/Plan (PT)    Planned Therapy Interventions (PT) balance training;bed mobility training;gait training;home exercise program;patient/family education;strengthening;transfer training  -           User Key  (r) = Recorded By, (t) = Taken By, (c) = Cosigned By    Initials Name Provider Type    Priyanka Hussein, PT Physical Therapist                Clinical Impression     Row Name 09/10/22 1639          Pain    Pain Location - Side/Orientation Left  -     Pain Location - hip  -     Pre/Posttreatment Pain Comment pt with L hip pain post-op  -     Pain Intervention(s) Repositioned  -     Row Name 09/10/22 1639          Plan of Care Review    Plan of Care Reviewed With patient  -     Outcome Evaluation Pt is a 89 yo F who is post-op L hip IT nailing s/p fall with hip fracture. Pt presents to PT with impaired functional mobitiy and gait secondary to generalized weakness, impaired balance, and decreased activity tolerance. Pt may benefit from skilled PT to address strength, mobility, and gait.  -     Row Name 09/10/22 1639          Therapy Assessment/Plan (PT)    Patient/Family Therapy Goals Statement (PT) to return to OF  -     Rehab Potential (PT) good, to achieve stated therapy goals  -     Criteria for Skilled Interventions Met (PT) skilled treatment is necessary  -     Therapy Frequency (PT) 6 times/wk  -     Row Name 09/10/22 1639          Positioning and Restraints    Pre-Treatment Position in bed  -     Post Treatment Position bed  -     In Bed supine;call light within reach;encouraged to call for assist;exit alarm on  -           User Key  (r) = Recorded By, (t) = Taken By, (c) = Cosigned By    Initials Name Provider Type     Priyanka Garcia, PT Physical Therapist               Outcome Measures     Row Name 09/10/22 1642          How much help from another person do you currently need...    Turning from your back to your side while in flat bed without using bedrails? 2  -CH     Moving from lying on back to sitting on the side of a flat bed without bedrails? 2  -CH     Moving to and from a bed to a chair (including a wheelchair)? 1  -CH     Standing up from a chair using your arms (e.g., wheelchair, bedside chair)? 1  -CH     Climbing 3-5 steps with a railing? 1  -CH     To walk in hospital room? 1  -CH     AM-PAC 6 Clicks  Score (PT) 8  -     Highest level of mobility 3 --> Sat at edge of bed  -     Row Name 09/10/22 1642          Functional Assessment    Outcome Measure Options AM-PAC 6 Clicks Basic Mobility (PT)  -           User Key  (r) = Recorded By, (t) = Taken By, (c) = Cosigned By    Initials Name Provider Type     Priyanka Garcia PT Physical Therapist                             Physical Therapy Education                 Title: PT OT SLP Therapies (Done)     Topic: Physical Therapy (Done)     Point: Mobility training (Done)     Learning Progress Summary           Patient Acceptance, E,TB,D, VU,NR by  at 9/10/2022 1642                   Point: Home exercise program (Done)     Learning Progress Summary           Patient Acceptance, E,TB,D, VU,NR by  at 9/10/2022 1642                   Point: Body mechanics (Done)     Learning Progress Summary           Patient Acceptance, E,TB,D, VU,NR by  at 9/10/2022 1642                   Point: Precautions (Done)     Learning Progress Summary           Patient Acceptance, E,TB,D, VU,NR by  at 9/10/2022 1642                               User Key     Initials Effective Dates Name Provider Type Discipline     06/16/21 -  Priyanka Garcia PT Physical Therapist PT              PT Recommendation and Plan  Planned Therapy Interventions (PT): balance training, bed mobility training, gait training, home exercise program, patient/family education, strengthening, transfer training  Plan of Care Reviewed With: patient  Outcome Evaluation: Pt is a 89 yo F who is post-op L hip IT nailing s/p fall with hip fracture. Pt presents to PT with impaired functional mobitiy and gait secondary to generalized weakness, impaired balance, and decreased activity tolerance. Pt may benefit from skilled PT to address strength, mobility, and gait.     Time Calculation:    PT Charges     Row Name 09/10/22 1642             Time Calculation    Start Time 1544  -      Stop Time 1602  -      Time  Calculation (min) 18 min  -CH      PT Received On 09/10/22  -      PT - Next Appointment 09/12/22  -      PT Goal Re-Cert Due Date 09/17/22  -              Time Calculation- PT    Total Timed Code Minutes- PT 10 minute(s)  -              Timed Charges    26971 - PT Therapeutic Activity Minutes 10  -CH              Total Minutes    Timed Charges Total Minutes 10  -CH       Total Minutes 10  -CH            User Key  (r) = Recorded By, (t) = Taken By, (c) = Cosigned By    Initials Name Provider Type     Priyanka Garcia, PT Physical Therapist              Therapy Charges for Today     Code Description Service Date Service Provider Modifiers Qty    37944250996 HC PT THERAPEUTIC ACT EA 15 MIN 9/10/2022 Priyanka Garcia, PT GP 1    [id number]  PT EVAL MOD COMPLEXITY 2 9/10/2022 Priyanka Garcia, PT GP 1          PT G-Codes  Outcome Measure Options: AM-PAC 6 Clicks Basic Mobility (PT)  AM-PAC 6 Clicks Score (PT): 8    Priyanka Garcia PT  9/10/2022

## 2022-09-10 NOTE — PROGRESS NOTES
"Patients Name:  Brandy Han  YOB: 1930  Medical Records Number:  [id number]    HPI: Patient seen exam this morning.  She still having difficulty swallowing at this time.  Pain is controlled.      Exam:  /64 (BP Location: Right arm, Patient Position: Lying)   Pulse 92   Temp 98.6 °F (37 °C) (Oral)   Resp 18   Ht 170.2 cm (67\")   Wt 77 kg (169 lb 12.1 oz)   SpO2 99%   BMI 26.59 kg/m²     Incision: Clean, intact.  Extremity:  Calf soft.  Negative Homans sign.  Weak but intact motor and sensory function in foot.  Good pedal pulses with brisk cap refill.    Lab Results (last 24 hours)     Procedure Component Value Units Date/Time    CBC & Differential [[id number]] Collected: 09/10/22 1001    Specimen: Blood Updated: 09/10/22 1051    Narrative:      The following orders were created for panel order CBC & Differential.  Procedure                               Abnormality         Status                     ---------                               -----------         ------                     CBC Auto Differential[[id number]]                            In process                   Please view results for these tests on the individual orders.    CBC Auto Differential [[id number]] Collected: 09/10/22 1001    Specimen: Blood Updated: 09/10/22 1051    Basic Metabolic Panel [[id number]] Collected: 09/10/22 1001    Specimen: Blood Updated: 09/10/22 1051    Basic Metabolic Panel [[id number]]  (Abnormal) Collected: 09/09/22 1145    Specimen: Blood Updated: 09/09/22 1230     Glucose 101 mg/dL      BUN 22 mg/dL      Creatinine 0.78 mg/dL      Sodium 135 mmol/L      Potassium 3.9 mmol/L      Chloride 104 mmol/L      CO2 19.8 mmol/L      Calcium 8.1 mg/dL      BUN/Creatinine Ratio 28.2     Anion Gap 11.2 mmol/L      eGFR 71.4 mL/min/1.73      Comment: National Kidney Foundation and American Society of Nephrology (ASN) Task Force recommended calculation based on the Chronic Kidney Disease Epidemiology " Collaboration (CKD-EPI) equation refit without adjustment for race.       Narrative:      GFR Normal >60  Chronic Kidney Disease <60  Kidney Failure <15      CBC & Differential [[id number]]  (Abnormal) Collected: 09/09/22 1145    Specimen: Blood Updated: 09/09/22 1208    Narrative:      The following orders were created for panel order CBC & Differential.  Procedure                               Abnormality         Status                     ---------                               -----------         ------                     CBC Auto Differential[[id number]]        Abnormal            Final result                 Please view results for these tests on the individual orders.    CBC Auto Differential [[id number]]  (Abnormal) Collected: 09/09/22 1145    Specimen: Blood Updated: 09/09/22 1208     WBC 8.46 10*3/mm3      RBC 3.43 10*6/mm3      Hemoglobin 10.8 g/dL      Hematocrit 32.9 %      MCV 95.9 fL      MCH 31.5 pg      MCHC 32.8 g/dL      RDW 12.0 %      RDW-SD 42.1 fl      MPV 10.3 fL      Platelets 116 10*3/mm3      Neutrophil % 79.7 %      Lymphocyte % 10.5 %      Monocyte % 8.5 %      Eosinophil % 0.4 %      Basophil % 0.2 %      Immature Grans % 0.7 %      Neutrophils, Absolute 6.74 10*3/mm3      Lymphocytes, Absolute 0.89 10*3/mm3      Monocytes, Absolute 0.72 10*3/mm3      Eosinophils, Absolute 0.03 10*3/mm3      Basophils, Absolute 0.02 10*3/mm3      Immature Grans, Absolute 0.06 10*3/mm3      nRBC 0.0 /100 WBC           Plan:   1.  POD# 1 s/p cephalo-medullary fixation of left hip fracture   2.  Continue PT for mobilization, ROM.  Weight bearing status: WBAT   3.  Begin DVT prophylaxis: Plan to start Lovenox Per medical and cardiology recommendations  Ice surgical extremity  Vitamin D supplementation for bone healing and health   orthopedic discharge instructions have been placed.    Please call any questions or concerns thank you    Gene Welsh MD  09/10/22

## 2022-09-10 NOTE — PROGRESS NOTES
"Patient Name: Brandy Han  :1930  89 y.o.      Patient Care Team:  Rupal Sutton MD as PCP - General    Interval History:   Status post hip surgery    Subjective:  Complaining of low back pain and not being able to swallow    Objective   Vital Signs  Temp:  [98.3 °F (36.8 °C)-99.1 °F (37.3 °C)] 98.6 °F (37 °C)  Heart Rate:  [79-99] 92  Resp:  [12-18] 18  BP: (111-131)/(59-88) 111/64    Intake/Output Summary (Last 24 hours) at 9/10/2022 1014  Last data filed at 9/10/2022 0715  Gross per 24 hour   Intake 320 ml   Output 525 ml   Net -205 ml     Flowsheet Rows    Flowsheet Row First Filed Value   Admission Height 170.2 cm (67\") Documented at 2022   Admission Weight 77.2 kg (170 lb 3.1 oz) Documented at 2022          Physical Exam:   General Appearance:    Alert, cooperative, in no acute distress   Lungs:     Clear to auscultation.  Normal respiratory effort and rate.      Heart:    Regular rhythm and normal rate, normal S1 and S2, no murmurs, gallops or rubs.     Chest Wall:    No abnormalities observed   Abdomen:     Soft, nontender, positive bowel sounds.     Extremities:   no cyanosis, clubbing or edema.  No marked joint deformities.  Adequate musculoskeletal strength.       Results Review:    Results from last 7 days   Lab Units 22  1145   SODIUM mmol/L 135*   POTASSIUM mmol/L 3.9   CHLORIDE mmol/L 104   CO2 mmol/L 19.8*   BUN mg/dL 22   CREATININE mg/dL 0.78   GLUCOSE mg/dL 101*   CALCIUM mg/dL 8.1*     Results from last 7 days   Lab Units 22  1414   CK TOTAL U/L 581*   TROPONIN T ng/mL <0.010     Results from last 7 days   Lab Units 22  1145   WBC 10*3/mm3 8.46   HEMOGLOBIN g/dL 10.8*   HEMATOCRIT % 32.9*   PLATELETS 10*3/mm3 116*                         Medication Review:   atorvastatin, 20 mg, Oral, Daily  cyanocobalamin, 1,000 mcg, Intramuscular, Daily  digoxin, 125 mcg, Intravenous, Daily  enoxaparin, 1 mg/kg, Subcutaneous, Q12H  metoprolol tartrate, 5 mg, " Intravenous, Q6H  polyethylene glycol, 17 g, Oral, Daily  [START ON 9/13/2022] vitamin B-12, 1,000 mcg, Oral, Daily  vitamin D, 50,000 Units, Oral, Q7 Days         lactated ringers, 9 mL/hr  lactated ringers, 9 mL/hr, Last Rate: 100 mL/hr (09/09/22 0744)  lactated ringers, 100 mL/hr, Last Rate: 100 mL/hr (09/09/22 1320)        Assessment & Plan     1.  Permanent atrial fibrillation.  Currently on Lovenox.  On IV metoprolol and digoxin.  Continue IV medications until her swallowing issues have resolved and we can resume her home medications.  2.  Fall/hip fracture/postoperative day 1 surgery for said hip fracture.  3.  Carotid artery stenosis  4.  Hyperlipidemia.    Perla Mao MD, [facility]  09/10/22  10:14 EDT

## 2022-09-10 NOTE — PLAN OF CARE
Goal Outcome Evaluation:  Plan of Care Reviewed With: patient        Progress: no change  Outcome Evaluation: Patient has been resting well this shift.  Patient states discomfort in back, but limited pain at surgical site.  See MAR for pain management per MD order.  Patient continues to refuse sips of water.  Some medications changed to IV on previous shift.  VSS.  Anxiety noted at about 0500 with patient request to call family to come in. Family called by RN and they spoke with patient.  Will continue to assess pain, monitor vitals, and labs.

## 2022-09-11 LAB
ANION GAP SERPL CALCULATED.3IONS-SCNC: 8 MMOL/L (ref 5–15)
BASOPHILS # BLD AUTO: 0.02 10*3/MM3 (ref 0–0.2)
BASOPHILS NFR BLD AUTO: 0.3 % (ref 0–1.5)
BUN SERPL-MCNC: 19 MG/DL (ref 8–23)
BUN/CREAT SERPL: 31.7 (ref 7–25)
CALCIUM SPEC-SCNC: 7.8 MG/DL (ref 8.2–9.6)
CHLORIDE SERPL-SCNC: 102 MMOL/L (ref 98–107)
CO2 SERPL-SCNC: 24 MMOL/L (ref 22–29)
CREAT SERPL-MCNC: 0.6 MG/DL (ref 0.57–1)
DEPRECATED RDW RBC AUTO: 39.9 FL (ref 37–54)
EGFRCR SERPLBLD CKD-EPI 2021: 84.3 ML/MIN/1.73
EOSINOPHIL # BLD AUTO: 0.1 10*3/MM3 (ref 0–0.4)
EOSINOPHIL NFR BLD AUTO: 1.5 % (ref 0.3–6.2)
ERYTHROCYTE [DISTWIDTH] IN BLOOD BY AUTOMATED COUNT: 11.6 % (ref 12.3–15.4)
GLUCOSE SERPL-MCNC: 84 MG/DL (ref 65–99)
HCT VFR BLD AUTO: 25.7 % (ref 34–46.6)
HGB BLD-MCNC: 8.5 G/DL (ref 12–15.9)
IMM GRANULOCYTES # BLD AUTO: 0.06 10*3/MM3 (ref 0–0.05)
IMM GRANULOCYTES NFR BLD AUTO: 0.9 % (ref 0–0.5)
LYMPHOCYTES # BLD AUTO: 1.45 10*3/MM3 (ref 0.7–3.1)
LYMPHOCYTES NFR BLD AUTO: 21.8 % (ref 19.6–45.3)
MCH RBC QN AUTO: 30.6 PG (ref 26.6–33)
MCHC RBC AUTO-ENTMCNC: 33.1 G/DL (ref 31.5–35.7)
MCV RBC AUTO: 92.4 FL (ref 79–97)
MONOCYTES # BLD AUTO: 0.8 10*3/MM3 (ref 0.1–0.9)
MONOCYTES NFR BLD AUTO: 12 % (ref 5–12)
NEUTROPHILS NFR BLD AUTO: 4.22 10*3/MM3 (ref 1.7–7)
NEUTROPHILS NFR BLD AUTO: 63.5 % (ref 42.7–76)
NRBC BLD AUTO-RTO: 0 /100 WBC (ref 0–0.2)
PLATELET # BLD AUTO: 113 10*3/MM3 (ref 140–450)
PMV BLD AUTO: 10.4 FL (ref 6–12)
POTASSIUM SERPL-SCNC: 4 MMOL/L (ref 3.5–5.2)
RBC # BLD AUTO: 2.78 10*6/MM3 (ref 3.77–5.28)
SODIUM SERPL-SCNC: 134 MMOL/L (ref 136–145)
WBC NRBC COR # BLD: 6.65 10*3/MM3 (ref 3.4–10.8)

## 2022-09-11 PROCEDURE — 85025 COMPLETE CBC W/AUTO DIFF WBC: CPT | Performed by: ORTHOPAEDIC SURGERY

## 2022-09-11 PROCEDURE — 25010000002 DIGOXIN PER 500 MCG: Performed by: INTERNAL MEDICINE

## 2022-09-11 PROCEDURE — 25010000002 MORPHINE PER 10 MG: Performed by: ORTHOPAEDIC SURGERY

## 2022-09-11 PROCEDURE — 80048 BASIC METABOLIC PNL TOTAL CA: CPT | Performed by: ORTHOPAEDIC SURGERY

## 2022-09-11 PROCEDURE — 99232 SBSQ HOSP IP/OBS MODERATE 35: CPT | Performed by: INTERNAL MEDICINE

## 2022-09-11 PROCEDURE — 25010000002 ENOXAPARIN PER 10 MG: Performed by: ORTHOPAEDIC SURGERY

## 2022-09-11 PROCEDURE — 99024 POSTOP FOLLOW-UP VISIT: CPT | Performed by: ORTHOPAEDIC SURGERY

## 2022-09-11 PROCEDURE — 25010000002 CYANOCOBALAMIN PER 1000 MCG: Performed by: ORTHOPAEDIC SURGERY

## 2022-09-11 RX ADMIN — MORPHINE SULFATE 2 MG: 2 INJECTION, SOLUTION INTRAMUSCULAR; INTRAVENOUS at 11:12

## 2022-09-11 RX ADMIN — DIGOXIN 125 MCG: 0.25 INJECTION INTRAMUSCULAR; INTRAVENOUS at 11:02

## 2022-09-11 RX ADMIN — POLYETHYLENE GLYCOL 3350 17 G: 17 POWDER, FOR SOLUTION ORAL at 09:02

## 2022-09-11 RX ADMIN — MORPHINE SULFATE 2 MG: 2 INJECTION, SOLUTION INTRAMUSCULAR; INTRAVENOUS at 21:03

## 2022-09-11 RX ADMIN — METOPROLOL TARTRATE 5 MG: 1 INJECTION, SOLUTION INTRAVENOUS at 03:39

## 2022-09-11 RX ADMIN — METOPROLOL TARTRATE 5 MG: 1 INJECTION, SOLUTION INTRAVENOUS at 09:01

## 2022-09-11 RX ADMIN — ENOXAPARIN SODIUM 80 MG: 100 INJECTION SUBCUTANEOUS at 09:01

## 2022-09-11 RX ADMIN — CYANOCOBALAMIN 1000 MCG: 1000 INJECTION, SOLUTION INTRAMUSCULAR; SUBCUTANEOUS at 09:01

## 2022-09-11 RX ADMIN — METOPROLOL TARTRATE 5 MG: 1 INJECTION, SOLUTION INTRAVENOUS at 21:04

## 2022-09-11 RX ADMIN — METOPROLOL TARTRATE 5 MG: 1 INJECTION, SOLUTION INTRAVENOUS at 15:45

## 2022-09-11 RX ADMIN — ENOXAPARIN SODIUM 80 MG: 100 INJECTION SUBCUTANEOUS at 21:04

## 2022-09-11 RX ADMIN — MORPHINE SULFATE 2 MG: 2 INJECTION, SOLUTION INTRAMUSCULAR; INTRAVENOUS at 03:40

## 2022-09-11 NOTE — PROGRESS NOTES
Name: Brandy Han ADMIT: 2022   : 1930  PCP: Rupal Sutton MD    MRN: [id number] LOS: 4 days   AGE/SEX: 89 y.o. female  ROOM: [facility]     Subjective   Subjective   Patient seen at bedside.     Objective   Objective   Vital Signs  Temp:  [97.9 °F (36.6 °C)-98.8 °F (37.1 °C)] 98.8 °F (37.1 °C)  Heart Rate:  [74-97] 85  Resp:  [16] 16  BP: (111-123)/(58-67) 117/58  SpO2:  [98 %-99 %] 98 %  on  Flow (L/min):  [2] 2;   Device (Oxygen Therapy): nasal cannula  Body mass index is 26.59 kg/m².  Physical Exam   General, awake and alert.  Head and ENT, normocephalic and atraumatic.  Lungs, symmetric expansion, equal air entry bilaterally.  Heart, irregularly irregular  Abdomen, soft and nontender.  Extremities, no clubbing or cyanosis.  Neuro, no focal deficits.  Skin: Warm and no rash.  Psych, normal mood and affect.  Musculoskeletal, joint examination is grossly normal.    Results Review     I reviewed the patient's new clinical results.  Results from last 7 days   Lab Units 09/11/22  0713 09/10/22  10022  11422  0634   WBC 10*3/mm3 6.65 6.62 8.46 8.65   HEMOGLOBIN g/dL 8.5* 8.9* 10.8* 10.9*   PLATELETS 10*3/mm3 113* 95* 116* 125*     Results from last 7 days   Lab Units 22  0713 09/10/22  1001 22  1145 22  0634   SODIUM mmol/L 134* 133* 135* 136   POTASSIUM mmol/L 4.0 4.0 3.9 4.1   CHLORIDE mmol/L 102 101 104 106   CO2 mmol/L 24.0 23.0 19.8* 20.0*   BUN mg/dL  21   CREATININE mg/dL 0.60 0.71 0.78 0.69   GLUCOSE mg/dL 84 93 101* 85   EGFR mL/min/1.73 84.3 79.9 71.4 81.5     Results from last 7 days   Lab Units 22  1414   ALBUMIN g/dL 3.40*   BILIRUBIN mg/dL 1.7*   ALK PHOS U/L 69   AST (SGOT) U/L 24   ALT (SGPT) U/L 8     Results from last 7 days   Lab Units 22  0713 09/10/22  1001 22  1145 22  0634 22  1414   CALCIUM mg/dL 7.8* 7.8* 8.1* 8.0* 8.7   ALBUMIN g/dL  --   --   --   --  3.40*       No results found for: HGBA1C,  POCGLU    No radiology results for the last day  Scheduled Medications  atorvastatin, 20 mg, Oral, Daily  cyanocobalamin, 1,000 mcg, Intramuscular, Daily  digoxin, 125 mcg, Intravenous, Daily  enoxaparin, 1 mg/kg, Subcutaneous, Q12H  metoprolol tartrate, 5 mg, Intravenous, Q6H  polyethylene glycol, 17 g, Oral, Daily  [START ON 9/13/2022] vitamin B-12, 1,000 mcg, Oral, Daily  vitamin D, 50,000 Units, Oral, Q7 Days    Infusions  lactated ringers, 9 mL/hr  lactated ringers, 9 mL/hr, Last Rate: 100 mL/hr (09/09/22 0744)  lactated ringers, 100 mL/hr, Last Rate: 100 mL/hr (09/09/22 1320)    Diet  Diet Regular       Assessment/Plan     Active Hospital Problems    Diagnosis  POA   • **Closed comminuted intertrochanteric fracture of proximal end of left femur (HCC) [S72.142A]  Yes   • Osteoporosis with pathological fracture [M80.00XA]  Yes   • Chronic diastolic congestive heart failure (HCC) [I50.32]  Yes   • Vitamin D deficiency [E55.9]  Yes   • Paroxysmal atrial fibrillation (HCC) [I48.0]  Yes   • B12 deficiency [E53.8]  Yes      Resolved Hospital Problems   No resolved problems to display.       89 y.o. female admitted with Closed comminuted intertrochanteric fracture of proximal end of left femur (HCC).    Assessment and plan:  1.  Closed comminuted intertrochanteric fracture of proximal end of left femur, status post cephalomedullary fixation of left hip fracture, orthopedics on board.  Continue weightbearing as tolerated.  Encourage ambulation with physical therapy.     2.  History of osteoporosis, patient on vitamin D supplementation.     3.  Paroxysmal atrial fibrillation, patient is seen by cardiology, she is rate controlled, she is on IV digoxin as well as full dose Lovenox.     4.  Hyperlipidemia, continue statin therapy.     5.  CODE STATUS is full code.  Further plans based on hospital course.  Patient will need rehab placement upon discharge.      Frankie Olson MD  [facility]  09/11/22  17:49 EDT

## 2022-09-11 NOTE — PROGRESS NOTES
"Patients Name:  Brandy Han  YOB: 1930  Medical Records Number:  [id number]    HPI: Patient seen and examined this afternoon.  Overall her pain is controlled but she is having some neck and low back pain that has been a chronic issue for her.  She still having difficulty swallowing.  Overall pain is controlled.    Exam:  /58 (BP Location: Left arm, Patient Position: Lying)   Pulse 85   Temp 98.8 °F (37.1 °C) (Oral)   Resp 16   Ht 170.2 cm (67\")   Wt 77 kg (169 lb 12.1 oz)   SpO2 98%   BMI 26.59 kg/m²     Incision: Clean, intact.  Extremity:  Calf soft.  Negative Homans sign.  Good motor and sensory function in foot and is at her baseline she states she always had a little bit of numbness that left lower foot and had moved it much even prior to injury.  Good pedal pulses with brisk cap refill.    Lab Results (last 24 hours)     Procedure Component Value Units Date/Time    CBC & Differential [[id number]]  (Abnormal) Collected: 09/11/22 0713    Specimen: Blood Updated: 09/11/22 0832    Narrative:      The following orders were created for panel order CBC & Differential.  Procedure                               Abnormality         Status                     ---------                               -----------         ------                     CBC Auto Differential[[id number]]        Abnormal            Final result                 Please view results for these tests on the individual orders.    CBC Auto Differential [[id number]]  (Abnormal) Collected: 09/11/22 0713    Specimen: Blood Updated: 09/11/22 0832     WBC 6.65 10*3/mm3      RBC 2.78 10*6/mm3      Hemoglobin 8.5 g/dL      Hematocrit 25.7 %      MCV 92.4 fL      MCH 30.6 pg      MCHC 33.1 g/dL      RDW 11.6 %      RDW-SD 39.9 fl      MPV 10.4 fL      Platelets 113 10*3/mm3      Neutrophil % 63.5 %      Lymphocyte % 21.8 %      Monocyte % 12.0 %      Eosinophil % 1.5 %      Basophil % 0.3 %      Immature Grans % 0.9 %      Neutrophils, " Absolute 4.22 10*3/mm3      Lymphocytes, Absolute 1.45 10*3/mm3      Monocytes, Absolute 0.80 10*3/mm3      Eosinophils, Absolute 0.10 10*3/mm3      Basophils, Absolute 0.02 10*3/mm3      Immature Grans, Absolute 0.06 10*3/mm3      nRBC 0.0 /100 WBC     Basic Metabolic Panel [[id number]]  (Abnormal) Collected: 09/11/22 0713    Specimen: Blood Updated: 09/11/22 0832     Glucose 84 mg/dL      BUN 19 mg/dL      Creatinine 0.60 mg/dL      Sodium 134 mmol/L      Potassium 4.0 mmol/L      Chloride 102 mmol/L      CO2 24.0 mmol/L      Calcium 7.8 mg/dL      BUN/Creatinine Ratio 31.7     Anion Gap 8.0 mmol/L      eGFR 84.3 mL/min/1.73      Comment: National Kidney Foundation and American Society of Nephrology (ASN) Task Force recommended calculation based on the Chronic Kidney Disease Epidemiology Collaboration (CKD-EPI) equation refit without adjustment for race.       Narrative:      GFR Normal >60  Chronic Kidney Disease <60  Kidney Failure <15            Plan:   1.  POD# 2 s/p cephalo-medullary fixation of left hip fracture   2.  Continue PT for mobilization, ROM.  Weight bearing status: WBAT   3.  DVT prophylaxis  Ice surgical extremity.  Continue to motivate for PT exercises even while in bed.  Discussed with the nurse about a swallow study she said speech will be seeing her tomorrow.    Will continue to monitor.    Please call any questions or concerns thank you      Gene Welsh MD  09/11/22

## 2022-09-11 NOTE — PLAN OF CARE
Goal Outcome Evaluation:  Patient dressing remains in place to left hip. Ice pack applied to surgical area. Patient continues to refuse food and drink; says that throat hurts and does not want to eat anything right now.  This RN attempted to encourage patient to at least try; patient continued to refuse. Speech consulted to assess patient swallowing.  Morphine given this afternoon for 10/10 left hip pain. All needs met at this time and call light in reach.

## 2022-09-11 NOTE — PROGRESS NOTES
"Patient Name: Brandy Han  :1930  89 y.o.      Patient Care Team:  Rupal Sutton MD as PCP - General    Interval History:   Still with a sore throat.  Complaining of neck pain    Subjective:  Following for atrial fibrillation    Objective   Vital Signs  Temp:  [97.9 °F (36.6 °C)-98.4 °F (36.9 °C)] 98.3 °F (36.8 °C)  Heart Rate:  [74-97] 74  Resp:  [16] 16  BP: (110-123)/(58-71) 111/67    Intake/Output Summary (Last 24 hours) at 2022 1055  Last data filed at 2022 0700  Gross per 24 hour   Intake 200 ml   Output 900 ml   Net -700 ml     Flowsheet Rows    Flowsheet Row First Filed Value   Admission Height 170.2 cm (67\") Documented at 2022   Admission Weight 77.2 kg (170 lb 3.1 oz) Documented at 2022          Physical Exam:   General Appearance:    Alert, cooperative, in no acute distress   Lungs:     Clear to auscultation.  Normal respiratory effort and rate.      Heart:   Irregularly irregular.  Heart rate controlled.   Chest Wall:    No abnormalities observed   Abdomen:     Soft, nontender, positive bowel sounds.     Extremities:   no cyanosis, clubbing or edema.  No marked joint deformities.  Adequate musculoskeletal strength.       Results Review:    Results from last 7 days   Lab Units 22  0713   SODIUM mmol/L 134*   POTASSIUM mmol/L 4.0   CHLORIDE mmol/L 102   CO2 mmol/L 24.0   BUN mg/dL 19   CREATININE mg/dL 0.60   GLUCOSE mg/dL 84   CALCIUM mg/dL 7.8*     Results from last 7 days   Lab Units 22  1414   CK TOTAL U/L 581*   TROPONIN T ng/mL <0.010     Results from last 7 days   Lab Units 22  0713   WBC 10*3/mm3 6.65   HEMOGLOBIN g/dL 8.5*   HEMATOCRIT % 25.7*   PLATELETS 10*3/mm3 113*                         Medication Review:   atorvastatin, 20 mg, Oral, Daily  cyanocobalamin, 1,000 mcg, Intramuscular, Daily  digoxin, 125 mcg, Intravenous, Daily  enoxaparin, 1 mg/kg, Subcutaneous, Q12H  metoprolol tartrate, 5 mg, Intravenous, Q6H  polyethylene " glycol, 17 g, Oral, Daily  [START ON 9/13/2022] vitamin B-12, 1,000 mcg, Oral, Daily  vitamin D, 50,000 Units, Oral, Q7 Days         lactated ringers, 9 mL/hr  lactated ringers, 9 mL/hr, Last Rate: 100 mL/hr (09/09/22 0744)  lactated ringers, 100 mL/hr, Last Rate: 100 mL/hr (09/09/22 1320)        Assessment & Plan     1.  Permanent atrial fibrillation.  Currently on Lovenox.  On IV metoprolol and digoxin.  Continue IV medications until her swallowing issues have resolved and we can resume her home medications.  2.  Fall/hip fracture/postoperative day 1 surgery for said hip fracture.  3.  Carotid artery stenosis  4.  Hyperlipidemia.    Perla Mao MD, [facility]  09/11/22  10:55 EDT

## 2022-09-12 LAB
ALBUMIN SERPL-MCNC: 2.2 G/DL (ref 3.5–5.2)
ALBUMIN/GLOB SERPL: 0.7 G/DL
ALP SERPL-CCNC: 54 U/L (ref 39–117)
ALT SERPL W P-5'-P-CCNC: 6 U/L (ref 1–33)
ANION GAP SERPL CALCULATED.3IONS-SCNC: 12 MMOL/L (ref 5–15)
AST SERPL-CCNC: 20 U/L (ref 1–32)
BASOPHILS # BLD AUTO: 0.02 10*3/MM3 (ref 0–0.2)
BASOPHILS NFR BLD AUTO: 0.3 % (ref 0–1.5)
BILIRUB SERPL-MCNC: 1 MG/DL (ref 0–1.2)
BUN SERPL-MCNC: 21 MG/DL (ref 8–23)
BUN/CREAT SERPL: 32.8 (ref 7–25)
CALCIUM SPEC-SCNC: 8.1 MG/DL (ref 8.2–9.6)
CHLORIDE SERPL-SCNC: 101 MMOL/L (ref 98–107)
CO2 SERPL-SCNC: 23 MMOL/L (ref 22–29)
CREAT SERPL-MCNC: 0.64 MG/DL (ref 0.57–1)
DEPRECATED RDW RBC AUTO: 40 FL (ref 37–54)
EGFRCR SERPLBLD CKD-EPI 2021: 83 ML/MIN/1.73
EOSINOPHIL # BLD AUTO: 0.09 10*3/MM3 (ref 0–0.4)
EOSINOPHIL NFR BLD AUTO: 1.3 % (ref 0.3–6.2)
ERYTHROCYTE [DISTWIDTH] IN BLOOD BY AUTOMATED COUNT: 11.7 % (ref 12.3–15.4)
GLOBULIN UR ELPH-MCNC: 3.2 GM/DL
GLUCOSE SERPL-MCNC: 80 MG/DL (ref 65–99)
HCT VFR BLD AUTO: 26.1 % (ref 34–46.6)
HGB BLD-MCNC: 8.7 G/DL (ref 12–15.9)
IMM GRANULOCYTES # BLD AUTO: 0.11 10*3/MM3 (ref 0–0.05)
IMM GRANULOCYTES NFR BLD AUTO: 1.6 % (ref 0–0.5)
LYMPHOCYTES # BLD AUTO: 1.39 10*3/MM3 (ref 0.7–3.1)
LYMPHOCYTES NFR BLD AUTO: 20.2 % (ref 19.6–45.3)
MCH RBC QN AUTO: 32 PG (ref 26.6–33)
MCHC RBC AUTO-ENTMCNC: 33.3 G/DL (ref 31.5–35.7)
MCV RBC AUTO: 96 FL (ref 79–97)
MONOCYTES # BLD AUTO: 0.81 10*3/MM3 (ref 0.1–0.9)
MONOCYTES NFR BLD AUTO: 11.8 % (ref 5–12)
NEUTROPHILS NFR BLD AUTO: 4.45 10*3/MM3 (ref 1.7–7)
NEUTROPHILS NFR BLD AUTO: 64.8 % (ref 42.7–76)
NRBC BLD AUTO-RTO: 0 /100 WBC (ref 0–0.2)
PLATELET # BLD AUTO: 136 10*3/MM3 (ref 140–450)
PMV BLD AUTO: 10.5 FL (ref 6–12)
POTASSIUM SERPL-SCNC: 4.2 MMOL/L (ref 3.5–5.2)
PROT SERPL-MCNC: 5.4 G/DL (ref 6–8.5)
RBC # BLD AUTO: 2.72 10*6/MM3 (ref 3.77–5.28)
SODIUM SERPL-SCNC: 136 MMOL/L (ref 136–145)
WBC NRBC COR # BLD: 6.87 10*3/MM3 (ref 3.4–10.8)

## 2022-09-12 PROCEDURE — 25010000002 MORPHINE PER 10 MG: Performed by: ORTHOPAEDIC SURGERY

## 2022-09-12 PROCEDURE — 25010000002 DIGOXIN PER 500 MCG: Performed by: INTERNAL MEDICINE

## 2022-09-12 PROCEDURE — 97530 THERAPEUTIC ACTIVITIES: CPT

## 2022-09-12 PROCEDURE — 80053 COMPREHEN METABOLIC PANEL: CPT | Performed by: INTERNAL MEDICINE

## 2022-09-12 PROCEDURE — 99222 1ST HOSP IP/OBS MODERATE 55: CPT | Performed by: INTERNAL MEDICINE

## 2022-09-12 PROCEDURE — 99232 SBSQ HOSP IP/OBS MODERATE 35: CPT | Performed by: NURSE PRACTITIONER

## 2022-09-12 PROCEDURE — 97110 THERAPEUTIC EXERCISES: CPT

## 2022-09-12 PROCEDURE — 85025 COMPLETE CBC W/AUTO DIFF WBC: CPT | Performed by: INTERNAL MEDICINE

## 2022-09-12 PROCEDURE — 25010000002 CYANOCOBALAMIN PER 1000 MCG: Performed by: ORTHOPAEDIC SURGERY

## 2022-09-12 PROCEDURE — 92610 EVALUATE SWALLOWING FUNCTION: CPT

## 2022-09-12 PROCEDURE — 99024 POSTOP FOLLOW-UP VISIT: CPT | Performed by: ORTHOPAEDIC SURGERY

## 2022-09-12 PROCEDURE — 25010000002 ENOXAPARIN PER 10 MG: Performed by: ORTHOPAEDIC SURGERY

## 2022-09-12 RX ADMIN — ENOXAPARIN SODIUM 80 MG: 100 INJECTION SUBCUTANEOUS at 21:00

## 2022-09-12 RX ADMIN — POLYETHYLENE GLYCOL 3350 17 G: 17 POWDER, FOR SOLUTION ORAL at 08:11

## 2022-09-12 RX ADMIN — DIGOXIN 125 MCG: 0.25 INJECTION INTRAMUSCULAR; INTRAVENOUS at 11:13

## 2022-09-12 RX ADMIN — METOPROLOL TARTRATE 5 MG: 1 INJECTION, SOLUTION INTRAVENOUS at 09:20

## 2022-09-12 RX ADMIN — METOPROLOL TARTRATE 5 MG: 1 INJECTION, SOLUTION INTRAVENOUS at 04:03

## 2022-09-12 RX ADMIN — CYANOCOBALAMIN 1000 MCG: 1000 INJECTION, SOLUTION INTRAMUSCULAR; SUBCUTANEOUS at 09:19

## 2022-09-12 RX ADMIN — MORPHINE SULFATE 2 MG: 2 INJECTION, SOLUTION INTRAMUSCULAR; INTRAVENOUS at 08:22

## 2022-09-12 RX ADMIN — MORPHINE SULFATE 2 MG: 2 INJECTION, SOLUTION INTRAMUSCULAR; INTRAVENOUS at 15:00

## 2022-09-12 RX ADMIN — SODIUM CHLORIDE, POTASSIUM CHLORIDE, SODIUM LACTATE AND CALCIUM CHLORIDE 100 ML/HR: 600; 310; 30; 20 INJECTION, SOLUTION INTRAVENOUS at 02:35

## 2022-09-12 RX ADMIN — ENOXAPARIN SODIUM 80 MG: 100 INJECTION SUBCUTANEOUS at 08:10

## 2022-09-12 RX ADMIN — METOPROLOL TARTRATE 5 MG: 1 INJECTION, SOLUTION INTRAVENOUS at 14:57

## 2022-09-12 NOTE — PLAN OF CARE
Goal Outcome Evaluation:  Plan of Care Reviewed With: patient        Progress: improving  Outcome Evaluation: Pt tolerated treatment fair this date. Pt c/o low back pain initially, then c/o L hip pain w/ movement, then R knee pain when attempting to stand. Pt required mod-max A x2 for bed mobility, then attempted standing w/ max A x2. Unsuccessful standing attempts d/t weakness and pain, unable to clear bottom off bed each time. Pt completed a couple seated LE exercises, though unable to dorsiflex on L foot, which she states is new.

## 2022-09-12 NOTE — PLAN OF CARE
Goal Outcome Evaluation:  Plan of Care Reviewed With: patient        Progress: improving  Outcome Evaluation: Pt on RA, Afib on monitor with HR controlled. IVF continues. Q2 turns. Purewick in place. Pt c/o hip pain, treated with Morphine. VSS. Will continue to monitor.

## 2022-09-12 NOTE — NURSING NOTE
"Speech evaluated patient this morning and was unable to tolerate thin liquids or applesauce. This RN notified Dr. Olson and orders received for NPO with ice chips and GI consult related to difficulty swallowing.  Patient continues to report feeling that there is \"something stuck in throat.\"    "

## 2022-09-12 NOTE — THERAPY TREATMENT NOTE
Patient Name: Brandy Han  : 1930    MRN: [id number]                              Today's Date: 2022       Admit Date: 2022    Visit Dx:     ICD-10-CM ICD-9-CM   1. Closed comminuted intertrochanteric fracture of left femur, initial encounter ([facility])  S72.142A 820.21   2. Atrial fibrillation with RVR ([facility])  I48.91 427.31     Patient Active Problem List   Diagnosis   • History of venous thrombosis   • Bilateral carotid artery stenosis   • Osteoporosis   • Hyperlipidemia   • Right-sided low back pain with right-sided sciatica   • Sinus arrest   • Presence of cardiac pacemaker   • B12 deficiency   • Chronic pain of both knees   • Non-traumatic rhabdomyolysis   • Closed traumatic nondisplaced fracture of sixth cervical vertebra ([facility])   • Closed nondisplaced fracture of left lateral portion of seventh cervical vertebra ([facility])   • Vitamin D deficiency   • Paroxysmal atrial fibrillation ([facility])   • Overactive bladder   • Closed comminuted intertrochanteric fracture of proximal end of left femur ([facility])   • Osteoporosis with pathological fracture   • Chronic diastolic congestive heart failure ([facility])     Past Medical History:   Diagnosis Date   • Anxiety    • Arterial thrombosis ([facility])    • Arthritis    • Atrial fibrillation ([facility])    • Headache    • Heart palpitations    • Migraine    • Syncope    • UTI (urinary tract infection)      Past Surgical History:   Procedure Laterality Date   • ANKLE SURGERY     • CARDIAC PACEMAKER PLACEMENT     • OTHER SURGICAL HISTORY Left     elbow surgery   • PACEMAKER IMPLANTATION        General Information     Row Name 22 151          Physical Therapy Time and Intention    Document Type therapy note (daily note)  -     Mode of Treatment physical therapy  -     Row Name 22 151          General Information    Existing Precautions/Restrictions fall  WBAT L LE  -     Row Name 22 151          Cognition    Orientation Status (Cognition) oriented x 3  -SM            User Key  (r) = Recorded By, (t) = Taken By, (c) = Cosigned By    Initials Name Provider Type    Klarissa Nascimento PTA Physical Therapist Assistant               Mobility     Row Name 09/12/22 1512          Bed Mobility    Bed Mobility supine-sit;sit-supine  -     Supine-Sit [place] (Bed Mobility) moderate assist (50% patient effort);maximum assist (25% patient effort);2 person assist  -     Sit-Supine [place] (Bed Mobility) maximum assist (25% patient effort);2 person assist  -     Assistive Device (Bed Mobility) bed rails;head of bed elevated  -     Row Name 09/12/22 1512          Sit-Stand Transfer    Sit-Stand [place] (Transfers) maximum assist (25% patient effort);2 person assist  -     Assistive Device (Sit-Stand Transfers) --  HHA  -     Comment, (Sit-Stand Transfer) attempted 2 stands, though unsuccessful at clearing bottom off each time  -           User Key  (r) = Recorded By, (t) = Taken By, (c) = Cosigned By    Initials Name Provider Type    Klarissa Nascimento PTA Physical Therapist Assistant               Obj/Interventions     Row Name 09/12/22 1515          Motor Skills    Therapeutic Exercise --  seated AP, LAQ x10 reps (pt unable to DF on L foot)  -           User Key  (r) = Recorded By, (t) = Taken By, (c) = Cosigned By    Initials Name Provider Type    Klarissa Nascimento PTA Physical Therapist Assistant               Goals/Plan    No documentation.                Clinical Impression     Row Name 09/12/22 1516          Pain    Pretreatment Pain Rating 4/10  -     Posttreatment Pain Rating 7/10  -     Pre/Posttreatment Pain Comment pt initially having low back pain, then had increased pain in L hip w/ movement, then c/o R knee pain when standing  -     Pain Intervention(s) Repositioned;Ambulation/increased activity;Rest  -     Row Name 09/12/22 1516          Positioning and Restraints    Pre-Treatment Position in bed  -     Post Treatment  Position bed  -SM     In Bed supine;call light within reach;encouraged to call for assist;exit alarm on  -SM           User Key  (r) = Recorded By, (t) = Taken By, (c) = Cosigned By    Initials Name Provider Type    Klarissa Nascimento PTA Physical Therapist Assistant               Outcome Measures     Row Name 09/12/22 1518          How much help from another person do you currently need...    Turning from your back to your side while in flat bed without using bedrails? 2  -SM     Moving from lying on back to sitting on the side of a flat bed without bedrails? 2  -SM     Moving to and from a bed to a chair (including a wheelchair)? 1  -SM     Standing up from a chair using your arms (e.g., wheelchair, bedside chair)? 2  -SM     Climbing 3-5 steps with a railing? 1  -SM     To walk in hospital room? 1  -SM     AM-PAC 6 Clicks Score (PT) 9  -SM     Highest level of mobility 3 --> Sat at edge of bed  -     Row Name 09/12/22 1518          Functional Assessment    Outcome Measure Options AM-PAC 6 Clicks Basic Mobility (PT)  -           User Key  (r) = Recorded By, (t) = Taken By, (c) = Cosigned By    Initials Name Provider Type    Klarissa Nascimento PTA Physical Therapist Assistant                             Physical Therapy Education                 Title: PT OT SLP Therapies (Done)     Topic: Physical Therapy (Done)     Point: Mobility training (Done)     Learning Progress Summary           Patient Acceptance, E,TB,D, VU,NR by  at 9/12/2022 1519    Acceptance, E,TB,D, VU,NR by  at 9/10/2022 1642                   Point: Home exercise program (Done)     Learning Progress Summary           Patient Acceptance, E,TB,D, VU,NR by  at 9/12/2022 1519    Acceptance, E,TB,D, VU,NR by  at 9/10/2022 1642                   Point: Body mechanics (Done)     Learning Progress Summary           Patient Acceptance, E,TB,D, VU,NR by  at 9/12/2022 1519    Acceptance, E,TB,D, VU,NR by  at 9/10/2022 1642                    Point: Precautions (Done)     Learning Progress Summary           Patient Acceptance, E,TB,D, VU,NR by  at 9/12/2022 1519    Acceptance, E,TB,D, VU,NR by  at 9/10/2022 1642                               User Key     Initials Effective Dates Name Provider Type [facility] 06/16/21 -  Priyanka Garcia, PT Physical Therapist PT     03/07/18 -  Klarissa Gilmore PTA Physical Therapist Assistant PT              PT Recommendation and Plan     Plan of Care Reviewed With: patient  Progress: improving  Outcome Evaluation: Pt tolerated treatment fair this date. Pt c/o low back pain initially, then c/o L hip pain w/ movement, then R knee pain when attempting to stand. Pt required mod-max A x2 for bed mobility, then attempted standing w/ max A x2. Unsuccessful standing attempts d/t weakness and pain, unable to clear bottom off bed each time. Pt completed a couple seated LE exercises, though unable to dorsiflex on L foot, which she states is new.     Time Calculation:    PT Charges     Row Name 09/12/22 1527             Time Calculation    Start Time 1120  -      Stop Time 1145  -      Time Calculation (min) 25 min  -      PT Received On 09/12/22  -      PT - Next Appointment 09/13/22  -            User Key  (r) = Recorded By, (t) = Taken By, (c) = Cosigned By    Initials Name Provider Type     Klarissa Gilmore PTA Physical Therapist Assistant              Therapy Charges for Today     Code Description Service Date Service Provider Modifiers Qty    80316167731 HC PT THER PROC EA 15 MIN 9/12/2022 Klarissa Gilmore PTA GP 1    28993940293 HC PT THERAPEUTIC ACT EA 15 MIN 9/12/2022 Klarissa Gilmore PTA GP 1    45820286587 HC PT THER SUPP EA 15 MIN 9/12/2022 Klarissa Gilmore PTA GP 2          PT G-Codes  Outcome Measure Options: AM-PAC 6 Clicks Basic Mobility (PT)  AM-PAC 6 Clicks Score (PT): 9    Klarissa Gilmore PTA  9/12/2022

## 2022-09-12 NOTE — PLAN OF CARE
Goal Outcome Evaluation:  Plan of Care Reviewed With: patient, other (see comments) (RN)  Outcome Evaluation: Clincal swallow evaluation completed. Patient reports progressive decline in swallow function over the last several months, worse after surgery on 9/9/22. She reports regurgitation of both solids and liquids and that material sticks, localizes to level of suprasternal notch. Laryngeal elevation and swallow timing appears adequate upon palpation. Delayed throat clearing with single ice chips. Immediate coughing with 1/4 teaspoon of puree. Suspected esophageal and/or pharyngeal dysphagia. Rec: NPO except ice chips sparingly following oral care. Non-oral meds. Frequent oral care. GI to further assess structure/function of esophagus. SLP to sign off at this time. Please reconsult as warrranted following GI consult.

## 2022-09-12 NOTE — PROGRESS NOTES
Orthopedic Progress Note      Patient: Brandy Han    YOB: 1930    Medical Record Number: [id number]    Attending Physician: Frankie Olson MD    Date of Admission: 9/7/2022  1:42 PM    Admitting Dx:  Atrial fibrillation with RVR ([facility]) [I48.91]  Closed comminuted intertrochanteric fracture of left femur, initial encounter ([facility]) [S72.332A]    Status Post:   Cephalomedullary fixation of left intertrochanteric femur fracture         Post Operative Day Number: 3    Current Problem List:   Patient Active Problem List   Diagnosis    History of venous thrombosis    Bilateral carotid artery stenosis    Osteoporosis    Hyperlipidemia    Right-sided low back pain with right-sided sciatica    Sinus arrest    Presence of cardiac pacemaker    B12 deficiency    Chronic pain of both knees    Non-traumatic rhabdomyolysis    Closed traumatic nondisplaced fracture of sixth cervical vertebra ([facility])    Closed nondisplaced fracture of left lateral portion of seventh cervical vertebra ([facility])    Vitamin D deficiency    Paroxysmal atrial fibrillation ([facility])    Overactive bladder    Closed comminuted intertrochanteric fracture of proximal end of left femur ([facility])    Osteoporosis with pathological fracture    Chronic diastolic congestive heart failure ([facility])         Past Medical History:   Diagnosis Date    Anxiety     Arterial thrombosis ([facility])     Arthritis     Atrial fibrillation ([facility])     Headache     Heart palpitations     Migraine     Syncope     UTI (urinary tract infection)        Current Medications:  Scheduled Meds:atorvastatin, 20 mg, Oral, Daily  digoxin, 125 mcg, Intravenous, Daily  enoxaparin, 1 mg/kg, Subcutaneous, Q12H  metoprolol tartrate, 5 mg, Intravenous, Q6H  polyethylene glycol, 17 g, Oral, Daily  [START ON 9/13/2022] vitamin B-12, 1,000 mcg, Oral, Daily  vitamin D, 50,000 Units, Oral, Q7 Days      PRN Meds:.  acetaminophen    acetaminophen    docusate sodium    HYDROcodone-acetaminophen    metoprolol  tartrate    Morphine    ondansetron **OR** ondansetron    SUBJECTIVE: 89 y.o.  female.  Awake and answering questions appropriately.      OBJECTIVE:   Vitals:    09/11/22 1900 09/11/22 2313 09/12/22 0403 09/12/22 0721   BP: 113/52 102/84 111/64 118/76   BP Location: Left arm Left arm  Left arm   Patient Position: Lying Lying  Lying   Pulse: 97 108 93 97   Resp: 16 16  16   Temp: 98.8 °F (37.1 °C) 98.2 °F (36.8 °C)  98.4 °F (36.9 °C)   TempSrc: Oral Oral  Oral   SpO2:    93%   Weight:       Height:         I/O last 3 completed shifts:  In: 1190 [P.O.:240; I.V.:950]  Out: 900 [Urine:900]    Diagnostic Tests:  Lab Results (last 72 hours)       Procedure Component Value Units Date/Time    Comprehensive Metabolic Panel [[id number]]  (Abnormal) Collected: 09/12/22 0528    Specimen: Blood Updated: 09/12/22 0711     Glucose 80 mg/dL      BUN 21 mg/dL      Creatinine 0.64 mg/dL      Sodium 136 mmol/L      Potassium 4.2 mmol/L      Comment: Slight hemolysis detected by analyzer. Results may be affected.        Chloride 101 mmol/L      CO2 23.0 mmol/L      Calcium 8.1 mg/dL      Total Protein 5.4 g/dL      Albumin 2.20 g/dL      ALT (SGPT) 6 U/L      AST (SGOT) 20 U/L      Comment: Slight hemolysis detected by analyzer. Results may be affected.        Alkaline Phosphatase 54 U/L      Total Bilirubin 1.0 mg/dL      Globulin 3.2 gm/dL      A/G Ratio 0.7 g/dL      BUN/Creatinine Ratio 32.8     Anion Gap 12.0 mmol/L      eGFR 83.0 mL/min/1.73      Comment: National Kidney Foundation and American Society of Nephrology (ASN) Task Force recommended calculation based on the Chronic Kidney Disease Epidemiology Collaboration (CKD-EPI) equation refit without adjustment for race.       Narrative:      GFR Normal >60  Chronic Kidney Disease <60  Kidney Failure <15      CBC & Differential [[id number]]  (Abnormal) Collected: 09/12/22 0528    Specimen: Blood Updated: 09/12/22 0637    Narrative:      The following orders were created for panel  order CBC & Differential.  Procedure                               Abnormality         Status                     ---------                               -----------         ------                     CBC Auto Differential[[id number]]        Abnormal            Final result                 Please view results for these tests on the individual orders.    CBC Auto Differential [[id number]]  (Abnormal) Collected: 09/12/22 0528    Specimen: Blood Updated: 09/12/22 0637     WBC 6.87 10*3/mm3      RBC 2.72 10*6/mm3      Hemoglobin 8.7 g/dL      Hematocrit 26.1 %      MCV 96.0 fL      MCH 32.0 pg      MCHC 33.3 g/dL      RDW 11.7 %      RDW-SD 40.0 fl      MPV 10.5 fL      Platelets 136 10*3/mm3      Neutrophil % 64.8 %      Lymphocyte % 20.2 %      Monocyte % 11.8 %      Eosinophil % 1.3 %      Basophil % 0.3 %      Immature Grans % 1.6 %      Neutrophils, Absolute 4.45 10*3/mm3      Lymphocytes, Absolute 1.39 10*3/mm3      Monocytes, Absolute 0.81 10*3/mm3      Eosinophils, Absolute 0.09 10*3/mm3      Basophils, Absolute 0.02 10*3/mm3      Immature Grans, Absolute 0.11 10*3/mm3      nRBC 0.0 /100 WBC     CBC & Differential [[id number]]  (Abnormal) Collected: 09/11/22 0713    Specimen: Blood Updated: 09/11/22 0832    Narrative:      The following orders were created for panel order CBC & Differential.  Procedure                               Abnormality         Status                     ---------                               -----------         ------                     CBC Auto Differential[[id number]]        Abnormal            Final result                 Please view results for these tests on the individual orders.    CBC Auto Differential [[id number]]  (Abnormal) Collected: 09/11/22 0713    Specimen: Blood Updated: 09/11/22 0832     WBC 6.65 10*3/mm3      RBC 2.78 10*6/mm3      Hemoglobin 8.5 g/dL      Hematocrit 25.7 %      MCV 92.4 fL      MCH 30.6 pg      MCHC 33.1 g/dL      RDW 11.6 %      RDW-SD 39.9 fl      MPV  10.4 fL      Platelets 113 10*3/mm3      Neutrophil % 63.5 %      Lymphocyte % 21.8 %      Monocyte % 12.0 %      Eosinophil % 1.5 %      Basophil % 0.3 %      Immature Grans % 0.9 %      Neutrophils, Absolute 4.22 10*3/mm3      Lymphocytes, Absolute 1.45 10*3/mm3      Monocytes, Absolute 0.80 10*3/mm3      Eosinophils, Absolute 0.10 10*3/mm3      Basophils, Absolute 0.02 10*3/mm3      Immature Grans, Absolute 0.06 10*3/mm3      nRBC 0.0 /100 WBC     Basic Metabolic Panel [[id number]]  (Abnormal) Collected: 09/11/22 0713    Specimen: Blood Updated: 09/11/22 0832     Glucose 84 mg/dL      BUN 19 mg/dL      Creatinine 0.60 mg/dL      Sodium 134 mmol/L      Potassium 4.0 mmol/L      Chloride 102 mmol/L      CO2 24.0 mmol/L      Calcium 7.8 mg/dL      BUN/Creatinine Ratio 31.7     Anion Gap 8.0 mmol/L      eGFR 84.3 mL/min/1.73      Comment: National Kidney Foundation and American Society of Nephrology (ASN) Task Force recommended calculation based on the Chronic Kidney Disease Epidemiology Collaboration (CKD-EPI) equation refit without adjustment for race.       Narrative:      GFR Normal >60  Chronic Kidney Disease <60  Kidney Failure <15      CBC & Differential [[id number]]  (Abnormal) Collected: 09/10/22 1001    Specimen: Blood Updated: 09/10/22 1155    Narrative:      The following orders were created for panel order CBC & Differential.  Procedure                               Abnormality         Status                     ---------                               -----------         ------                     CBC Auto Differential[[id number]]        Abnormal            Final result                 Please view results for these tests on the individual orders.    CBC Auto Differential [[id number]]  (Abnormal) Collected: 09/10/22 1001    Specimen: Blood Updated: 09/10/22 1155     WBC 6.62 10*3/mm3      RBC 2.77 10*6/mm3      Hemoglobin 8.9 g/dL      Hematocrit 26.8 %      MCV 96.8 fL      MCH 32.1 pg      MCHC 33.2 g/dL       RDW 12.1 %      RDW-SD 42.5 fl      MPV 10.4 fL      Platelets 95 10*3/mm3      Neutrophil % 60.4 %      Lymphocyte % 24.0 %      Monocyte % 12.7 %      Eosinophil % 2.0 %      Basophil % 0.3 %      Immature Grans % 0.6 %      Neutrophils, Absolute 4.00 10*3/mm3      Lymphocytes, Absolute 1.59 10*3/mm3      Monocytes, Absolute 0.84 10*3/mm3      Eosinophils, Absolute 0.13 10*3/mm3      Basophils, Absolute 0.02 10*3/mm3      Immature Grans, Absolute 0.04 10*3/mm3      nRBC 0.0 /100 WBC     Basic Metabolic Panel [[id number]]  (Abnormal) Collected: 09/10/22 1001    Specimen: Blood Updated: 09/10/22 1133     Glucose 93 mg/dL      BUN 21 mg/dL      Creatinine 0.71 mg/dL      Sodium 133 mmol/L      Potassium 4.0 mmol/L      Chloride 101 mmol/L      CO2 23.0 mmol/L      Calcium 7.8 mg/dL      BUN/Creatinine Ratio 29.6     Anion Gap 9.0 mmol/L      eGFR 79.9 mL/min/1.73      Comment: National Kidney Foundation and American Society of Nephrology (ASN) Task Force recommended calculation based on the Chronic Kidney Disease Epidemiology Collaboration (CKD-EPI) equation refit without adjustment for race.       Narrative:      GFR Normal >60  Chronic Kidney Disease <60  Kidney Failure <15      Basic Metabolic Panel [[id number]]  (Abnormal) Collected: 09/09/22 1145    Specimen: Blood Updated: 09/09/22 1230     Glucose 101 mg/dL      BUN 22 mg/dL      Creatinine 0.78 mg/dL      Sodium 135 mmol/L      Potassium 3.9 mmol/L      Chloride 104 mmol/L      CO2 19.8 mmol/L      Calcium 8.1 mg/dL      BUN/Creatinine Ratio 28.2     Anion Gap 11.2 mmol/L      eGFR 71.4 mL/min/1.73      Comment: National Kidney Foundation and American Society of Nephrology (ASN) Task Force recommended calculation based on the Chronic Kidney Disease Epidemiology Collaboration (CKD-EPI) equation refit without adjustment for race.       Narrative:      GFR Normal >60  Chronic Kidney Disease <60  Kidney Failure <15      CBC & Differential [[id number]]   (Abnormal) Collected: 09/09/22 1145    Specimen: Blood Updated: 09/09/22 1208    Narrative:      The following orders were created for panel order CBC & Differential.  Procedure                               Abnormality         Status                     ---------                               -----------         ------                     CBC Auto Differential[[id number]]        Abnormal            Final result                 Please view results for these tests on the individual orders.    CBC Auto Differential [[id number]]  (Abnormal) Collected: 09/09/22 1145    Specimen: Blood Updated: 09/09/22 1208     WBC 8.46 10*3/mm3      RBC 3.43 10*6/mm3      Hemoglobin 10.8 g/dL      Hematocrit 32.9 %      MCV 95.9 fL      MCH 31.5 pg      MCHC 32.8 g/dL      RDW 12.0 %      RDW-SD 42.1 fl      MPV 10.3 fL      Platelets 116 10*3/mm3      Neutrophil % 79.7 %      Lymphocyte % 10.5 %      Monocyte % 8.5 %      Eosinophil % 0.4 %      Basophil % 0.2 %      Immature Grans % 0.7 %      Neutrophils, Absolute 6.74 10*3/mm3      Lymphocytes, Absolute 0.89 10*3/mm3      Monocytes, Absolute 0.72 10*3/mm3      Eosinophils, Absolute 0.03 10*3/mm3      Basophils, Absolute 0.02 10*3/mm3      Immature Grans, Absolute 0.06 10*3/mm3      nRBC 0.0 /100 WBC              PHYSICAL EXAM:  Very weak.  Left hip and thigh dressings intact with serosanguinous drainage.  Moderate edema to BLE.  Calf soft and nontender.  Unable to dorsiflex left foot and ankle.  Complaints of numbness with palpation.  Reports that she had numbness and difficulty dorsiflexing left foot prior to admission.  Was able to stand with great effort with PT this morning.  Complaints of right knee and LBP.  Right knee swollen and limited ROM without complaints of pain.  No palpable tenderness.  Hg 8.7.    GI to see for dysphagia.  Remains NPO for now.       ASSESSMENT & PLAN:    Continue to mobilize patient with PT as she can tolerate.    Lovenox for DVT prophylaxis.  Was on  Xarelto preop for A-fib.      Continue to monitor HG.      Patient will need to go to SNF for further rehab       Date: 9/12/2022    Savita Victoria RN    I reviewed and agree with the above including limited left ankle motion as discussed previously.  Continue physical therapy for mobilization.    Gene Welsh MD

## 2022-09-12 NOTE — PROGRESS NOTES
"    Patient Name: Brandy Han  :1930  89 y.o.      Patient Care Team:  Rupal Sutton MD as PCP - General    Chief Complaint: follow up AF, SSS, hip fracture    Interval History: speech saw today. Immediate coughing with 1/4 teaspoon of puree. Recommended NPO except ice chips sparingly and continuing non oral meds.        Objective   Vital Signs  Temp:  [98.2 °F (36.8 °C)-98.8 °F (37.1 °C)] 98.4 °F (36.9 °C)  Heart Rate:  [] 97  Resp:  [16] 16  BP: (102-118)/(52-84) 118/76    Intake/Output Summary (Last 24 hours) at 2022 1312  Last data filed at 2022 0925  Gross per 24 hour   Intake 1190 ml   Output 300 ml   Net 890 ml     Flowsheet Rows    Flowsheet Row First Filed Value   Admission Height 170.2 cm (67\") Documented at 2022   Admission Weight 77.2 kg (170 lb 3.1 oz) Documented at 2022 1816          Physical Exam:   General Appearance:    Alert, cooperative, in no acute distress   Lungs:     Clear to auscultation.  Normal respiratory effort and rate.      Heart:    irregular rhythm and normal rate, normal S1 and S2, no murmurs, gallops or rubs.     Chest Wall:    No abnormalities observed   Abdomen:     Soft, nontender, positive bowel sounds.     Extremities:   no cyanosis, clubbing or edema.  No marked joint deformities.  Adequate musculoskeletal strength.       Results Review:    Results from last 7 days   Lab Units 22  0528   SODIUM mmol/L 136   POTASSIUM mmol/L 4.2   CHLORIDE mmol/L 101   CO2 mmol/L 23.0   BUN mg/dL 21   CREATININE mg/dL 0.64   GLUCOSE mg/dL 80   CALCIUM mg/dL 8.1*     Results from last 7 days   Lab Units 22  1414   CK TOTAL U/L 581*   TROPONIN T ng/mL <0.010     Results from last 7 days   Lab Units 22  0528   WBC 10*3/mm3 6.87   HEMOGLOBIN g/dL 8.7*   HEMATOCRIT % 26.1*   PLATELETS 10*3/mm3 136*                           Medication Review:   atorvastatin, 20 mg, Oral, Daily  digoxin, 125 mcg, Intravenous, Daily  enoxaparin, 1 mg/kg, " Subcutaneous, Q12H  metoprolol tartrate, 5 mg, Intravenous, Q6H  polyethylene glycol, 17 g, Oral, Daily  [START ON 9/13/2022] vitamin B-12, 1,000 mcg, Oral, Daily  vitamin D, 50,000 Units, Oral, Q7 Days         lactated ringers, 9 mL/hr  lactated ringers, 9 mL/hr, Last Rate: 100 mL/hr (09/09/22 0750)  lactated ringers, 100 mL/hr, Last Rate: 100 mL/hr (09/12/22 7263)        Assessment & Plan   1. Chronic atrial fibrillation - presented with RVR. On IV meds for rate and subQ lovenox.   2. Unwitnessed fall - can not rule out syncope. Possible hypotension. Follow BP here closely. Echocardiogram pretty unremarkable.   3. Carotid artery stenosis  4. Hyperlipidemia   5.  Hip fracture status post surgical repair 9/9/2022  6. Dysphagia - evaluation underway. Remains NPO.    Continue IV medications until her swallowing issues have resolved and we can resume her home medications    SHERITA Garcia  [facility]  09/12/22  13:12 EDT

## 2022-09-12 NOTE — PROGRESS NOTES
Name: Brandy Han ADMIT: 2022   : 1930  PCP: Rupal Sutton MD    MRN: [id number] LOS: 5 days   AGE/SEX: 89 y.o. female  ROOM: [facility]     Subjective   Subjective   Patient seen at bedside.       Objective   Objective   Vital Signs  Temp:  [98.1 °F (36.7 °C)-98.8 °F (37.1 °C)] 98.1 °F (36.7 °C)  Heart Rate:  [] 93  Resp:  [16] 16  BP: (102-125)/(52-84) 125/69  SpO2:  [90 %-93 %] 90 %  on  Flow (L/min):  [2] 2;   Device (Oxygen Therapy): room air  Body mass index is 26.59 kg/m².  Physical Exam    General, awake and alert.  Head and ENT, normocephalic and atraumatic.  Lungs, symmetric expansion, equal air entry bilaterally.  Heart, irregularly irregular  Abdomen, soft and nontender.  Extremities, no clubbing or cyanosis.  Neuro, no focal deficits.  Skin: Warm and no rash.  Psych, normal mood and affect.  Musculoskeletal, joint examination is grossly normal.    Results Review     I reviewed the patient's new clinical results.  Results from last 7 days   Lab Units 09/12/22  0528 09/11/22  0713 09/10/22  1001 09/09/22  1145   WBC 10*3/mm3 6.87 6.65 6.62 8.46   HEMOGLOBIN g/dL 8.7* 8.5* 8.9* 10.8*   PLATELETS 10*3/mm3 136* 113* 95* 116*     Results from last 7 days   Lab Units 22  0713 09/10/22  10022  1145   SODIUM mmol/L 136 134* 133* 135*   POTASSIUM mmol/L 4.2 4.0 4.0 3.9   CHLORIDE mmol/L 101 102 101 104   CO2 mmol/L 23.0 24.0 23.0 19.8*   BUN mg/dL 21 19  22   CREATININE mg/dL 0.64 0.60 0.71 0.78   GLUCOSE mg/dL 80 84 93 101*   EGFR mL/min/1.73 83.0 84.3 79.9 71.4     Results from last 7 days   Lab Units 22  0528 22  1414   ALBUMIN g/dL 2.20* 3.40*   BILIRUBIN mg/dL 1.0 1.7*   ALK PHOS U/L 54 69   AST (SGOT) U/L 20 24   ALT (SGPT) U/L 6 8     Results from last 7 days   Lab Units 22  0528 22  0713 09/10/22  1001 22  1145 22  0634 22  1414   CALCIUM mg/dL 8.1* 7.8* 7.8* 8.1*   < > 8.7   ALBUMIN g/dL 2.20*  --   --   --    --  3.40*    < > = values in this interval not displayed.       No results found for: HGBA1C, POCGLU    No radiology results for the last day  Scheduled Medications  atorvastatin, 20 mg, Oral, Daily  digoxin, 125 mcg, Intravenous, Daily  enoxaparin, 1 mg/kg, Subcutaneous, Q12H  metoprolol tartrate, 5 mg, Intravenous, Q6H  polyethylene glycol, 17 g, Oral, Daily  [START ON 9/13/2022] vitamin B-12, 1,000 mcg, Oral, Daily  vitamin D, 50,000 Units, Oral, Q7 Days    Infusions  lactated ringers, 9 mL/hr  lactated ringers, 9 mL/hr, Last Rate: 100 mL/hr (09/09/22 0744)  lactated ringers, 100 mL/hr, Last Rate: 100 mL/hr (09/12/22 0235)    Diet  NPO Diet NPO Type: Ice Chips       Assessment/Plan     Active Hospital Problems    Diagnosis  POA   • **Closed comminuted intertrochanteric fracture of proximal end of left femur (HCC) [S72.142A]  Yes   • Osteoporosis with pathological fracture [M80.00XA]  Yes   • Chronic diastolic congestive heart failure (HCC) [I50.32]  Yes   • Vitamin D deficiency [E55.9]  Yes   • Paroxysmal atrial fibrillation ([facility]) [I48.0]  Yes   • B12 deficiency [E53.8]  Yes      Resolved Hospital Problems   No resolved problems to display.       89 y.o. female admitted with Closed comminuted intertrochanteric fracture of proximal end of left femur (HCC).    Assessment and plan:  1.  Closed comminuted intertrochanteric fracture of proximal end of left femur, status post cephalomedullary fixation of left hip fracture, orthopedics on board.  Continue weightbearing as tolerated.  Encourage ambulation with physical therapy.     2.  History of osteoporosis, patient on vitamin D supplementation.     3.  Paroxysmal atrial fibrillation, patient is seen by cardiology, she is rate controlled, she is on digoxin as well as full dose Lovenox.     4.  Hyperlipidemia, continue statin therapy.    5.  Dysphagia, GI evaluation, follow speech therapy recommendations.     6.  CODE STATUS is full code.  Further plans based on  hospital course.  Patient will need rehab placement upon discharge.  I believe she would benefit from palliative care evaluation.      Frankie Olson MD  [facility]  09/12/22  17:49 EDT

## 2022-09-12 NOTE — CONSULTS
[facility]  Initial Inpatient Consult Note    Referring Provider: Wesley    Reason for Consultation: Dysphagia    Subjective     History of present illness:    89 y.o. female admitted after fracture of left femur status postsurgical intervention.  GI consultation requested due to complaints of dysphagia.  Patient describes to me choking and coughing while eating.  This is been ongoing for at least a few months although seems to be worse since her admission.  She does report feeling extremely weak overall.  She did have a bedside swallow study today I did review the note from speech pathology.   There was description of immediate coughing after taking only 1/4 teaspoon of purée.  It was then recommended that patient have GI consultation to evaluate for possible esophageal cause of her dysphagia.  She does not report any discrete esophageal dysphagia to me.  She localizes her sensation of difficulty swallowing to her throat and sternal notch.  She has trouble with all consistencies of food and even liquids.      Past Medical History:  Past Medical History:   Diagnosis Date   • Anxiety    • Arterial thrombosis (HCC)    • Arthritis    • Atrial fibrillation (HCC)    • Headache    • Heart palpitations    • Migraine    • Syncope    • UTI (urinary tract infection)      Past Surgical History:  Past Surgical History:   Procedure Laterality Date   • ANKLE SURGERY     • CARDIAC PACEMAKER PLACEMENT     • OTHER SURGICAL HISTORY Left     elbow surgery   • PACEMAKER IMPLANTATION        Social History:   Social History     Tobacco Use   • Smoking status: Never Smoker   • Smokeless tobacco: Never Used   Substance Use Topics   • Alcohol use: Yes     Alcohol/week: 1.0 standard drink     Types: 1 Shots of liquor per week      Family History:  Family History   Problem Relation Age of Onset   • Heart disease Mother    • Lung cancer Father    • Stroke Daughter         cerebral accident       Home  Meds:  Facility-Administered Medications Prior to Admission   Medication Dose Route Frequency Provider Last Rate Last Admin   • [DISCONTINUED] cyanocobalamin injection 1,000 mcg  1,000 mcg Intramuscular Q28 Days Rupal Sutton MD   1,000 mcg at 05/04/21 1623     Medications Prior to Admission   Medication Sig Dispense Refill Last Dose   • atorvastatin (LIPITOR) 20 MG tablet TAKE 1 TABLET BY MOUTH DAILY 90 tablet 1    • digoxin (LANOXIN) 125 MCG tablet Take 125 mcg by mouth Daily.      • metoprolol succinate XL (TOPROL-XL) 50 MG 24 hr tablet TAKE 1 TABLET BY MOUTH DAILY 90 tablet 1    • spironolactone (ALDACTONE) 25 MG tablet TAKE 1 TABLET BY MOUTH DAILY 90 tablet 3    • Xarelto 15 MG tablet TAKE 1 TABLET BY MOUTH DAILY 30 tablet 0 9/5/2022 at Unknown time     Current Meds:   atorvastatin, 20 mg, Oral, Daily  digoxin, 125 mcg, Intravenous, Daily  enoxaparin, 1 mg/kg, Subcutaneous, Q12H  metoprolol tartrate, 5 mg, Intravenous, Q6H  polyethylene glycol, 17 g, Oral, Daily  [START ON 9/13/2022] vitamin B-12, 1,000 mcg, Oral, Daily  vitamin D, 50,000 Units, Oral, Q7 Days      Allergies:  No Known Allergies  Review of Systems  All systems were reviewed and negative except for:  Gastrointestinal: positive for  difficulty / pain with swallowing     Objective     Vital Signs  Temp:  [98.1 °F (36.7 °C)-98.8 °F (37.1 °C)] 98.1 °F (36.7 °C)  Heart Rate:  [] 93  Resp:  [16] 16  BP: (102-125)/(52-84) 125/69  Physical Exam:  General Appearance:     Alert, cooperative, in no acute distress   Head:    Normocephalic, without obvious abnormality, atraumatic   Eyes:            Lids and lashes normal, conjunctivae and sclerae normal, no icterus   Throat:   No oral lesions, no thrush, oral mucosa moist   Neck:   No adenopathy, supple, trachea midline, no thyromegaly, no carotid bruit, no JVD   Lungs:     Clear to auscultation,respirations regular, even and            unlabored    Heart:    Regular rhythm and normal rate, normal  S1 and S2, no        murmur, no gallop, no rub, no click   Chest Wall:    No abnormalities observed   Abdomen:     Normal bowel sounds, no masses, no organomegaly, soft     nontender, nondistended, no guarding, no rebound                 tenderness   Rectal:     Deferred   Extremities:   no edema, no cyanosis, no redness   Skin:   No bleeding, bruising or rash   Lymph nodes:   No palpable adenopathy   Psychiatric:  Judgement and insight: normal   Orientation to person place and time: normal   Mood and affect: normal   Results Review:   I reviewed the patient's new clinical results.    Results from last 7 days   Lab Units 09/12/22  0528 09/11/22  0713 09/10/22  1001   WBC 10*3/mm3 6.87 6.65 6.62   HEMOGLOBIN g/dL 8.7* 8.5* 8.9*   HEMATOCRIT % 26.1* 25.7* 26.8*   PLATELETS 10*3/mm3 136* 113* 95*     Results from last 7 days   Lab Units 09/12/22  0528 09/11/22  0713 09/10/22  1001 09/08/22  0634 09/07/22  1414   SODIUM mmol/L 136 134* 133*   < > 137   POTASSIUM mmol/L 4.2 4.0 4.0   < > 4.1   CHLORIDE mmol/L 101 102 101   < > 102   CO2 mmol/L 23.0 24.0 23.0   < > 23.0   BUN mg/dL 21 19 21   < > 22   CREATININE mg/dL 0.64 0.60 0.71   < > 0.87   CALCIUM mg/dL 8.1* 7.8* 7.8*   < > 8.7   BILIRUBIN mg/dL 1.0  --   --   --  1.7*   ALK PHOS U/L 54  --   --   --  69   ALT (SGPT) U/L 6  --   --   --  8   AST (SGOT) U/L 20  --   --   --  24   GLUCOSE mg/dL 80 84 93   < > 110*    < > = values in this interval not displayed.         Lab Results   Lab Value Date/Time    LIPASE 87 (H) 04/01/2018 2108       Radiology:  XR Hip 1 View Without Pelvis Left (Surgery Only)   Final Result   1. Satisfactory postoperative appearance of the left hip.       This report was finalized on 9/9/2022 10:08 AM by Dr. Ander Cast M.D.          XR Hip With or Without Pelvis 2 - 3 View Left   Final Result      FL C Arm During Surgery   Final Result      CT Head Without Contrast   Final Result       No evidence for acute traumatic intracranial  pathology.       Mild changes of chronic small vessel ischemic phenomena. 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           TECHNIQUE: CT scan of the cervical spine was obtained with 1 mm axial   bone algorithm and 2 mm axial soft tissue algorithm images. Sagittal and   coronal reconstructed images were obtained.       FINDINGS:       There are stable mild degrees of vertebral body height loss seen at the   C7, T1, T2, and T3 levels. These findings are unchanged when compared to   prior CT scan of the cervical spine dated 08/21/2020. There is no   convincing evidence to suggest acute fracture or bony malalignment   involving the cervical spine.       There are mild degrees of canal narrowing noted at the C2-C3, C3-C4,   C4-C5, C5-C6, and C6-C7 levels secondary to disc osteophyte complexes.   Relatively mild degrees of multilevel foraminal narrowing are also   appreciated from C3-C4 down to C6-C7.       IMPRESSION:       There is no convincing evidence to suggest acute fracture or bony   malalignment involving the cervical spine. There is mild   chronic-appearing vertebral body height loss from C7 down to T3 that is   unchanged in appearance when compared to the prior CT scan of the   cervical spine dated 08/21/2020.           Radiation dose reduction techniques were utilized, including automated   exposure control and exposure modulation based on body size.       This report was finalized on 9/8/2022 7:13 AM by Dr. Tigre Jacques M.D.          CT Cervical Spine Without Contrast   Final Result       No evidence for acute traumatic intracranial pathology.       Mild changes of chronic small vessel ischemic phenomena. 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           TECHNIQUE: CT scan of the cervical spine was obtained with 1 mm axial   bone  algorithm and 2 mm axial soft tissue algorithm images. Sagittal and   coronal reconstructed images were obtained.       FINDINGS:       There are stable mild degrees of vertebral body height loss seen at the   C7, T1, T2, and T3 levels. These findings are unchanged when compared to   prior CT scan of the cervical spine dated 08/21/2020. There is no   convincing evidence to suggest acute fracture or bony malalignment   involving the cervical spine.       There are mild degrees of canal narrowing noted at the C2-C3, C3-C4,   C4-C5, C5-C6, and C6-C7 levels secondary to disc osteophyte complexes.   Relatively mild degrees of multilevel foraminal narrowing are also   appreciated from C3-C4 down to C6-C7.       IMPRESSION:       There is no convincing evidence to suggest acute fracture or bony   malalignment involving the cervical spine. There is mild   chronic-appearing vertebral body height loss from C7 down to T3 that is   unchanged in appearance when compared to the prior CT scan of the   cervical spine dated 08/21/2020.           Radiation dose reduction techniques were utilized, including automated   exposure control and exposure modulation based on body size.       This report was finalized on 9/8/2022 7:13 AM by Dr. Tigre Jacques M.D.          XR Chest 1 View   Final Result   No focal pulmonary consolidation. Borderline heart size. A   right midlung nodule, as described.       This report was finalized on 9/7/2022 3:12 PM by Dr. Dudley Lau M.D.          XR Hip With or Without Pelvis 2 - 3 View Left   Final Result       Left intertrochanteric fracture.       This report was finalized on 9/7/2022 3:15 PM by Dr. Dudley Lau M.D.              Assessment & Plan   Assessment:   1.  Dysphagia - oropharyngeal by description  2.  L femur fracture s/p operative repair  3.  Generalized weakness    Plan:   Patient describes coughing and choking while attempting to eat which was replicated during bedside  swallow study today.  Description of her symptoms sounds like possible oral pharyngeal etiology and I wonder if she would benefit from a video swallow study.  I reached out to speech pathology to discuss.  Part of her issue may just be generalized weakness and deconditioning in the setting of her advanced age and frailty.  If VFSS is not felt appropriate then would attempt barium esophagram as able to tolerate.      I discussed the patients findings and my recommendations with patient.         Luis Enrique Jama M.D.  [facility]  [address] [place] [address]  Office: [phone]

## 2022-09-12 NOTE — THERAPY DISCHARGE NOTE
Acute Care - Speech Language Pathology   Swallow Initial Evaluation/Discharge [facility]     Patient Name: Brandy Han  : 1930  MRN: [id number]  Today's Date: 2022               Admit Date: 2022    Visit Dx:    ICD-10-CM ICD-9-CM   1. Closed comminuted intertrochanteric fracture of left femur, initial encounter ([facility])  S72.142A 820.21   2. Atrial fibrillation with RVR ([facility])  I48.91 427.31     Patient Active Problem List   Diagnosis   • History of venous thrombosis   • Bilateral carotid artery stenosis   • Osteoporosis   • Hyperlipidemia   • Right-sided low back pain with right-sided sciatica   • Sinus arrest   • Presence of cardiac pacemaker   • B12 deficiency   • Chronic pain of both knees   • Non-traumatic rhabdomyolysis   • Closed traumatic nondisplaced fracture of sixth cervical vertebra ([facility])   • Closed nondisplaced fracture of left lateral portion of seventh cervical vertebra ([facility])   • Vitamin D deficiency   • Paroxysmal atrial fibrillation ([facility])   • Overactive bladder   • Closed comminuted intertrochanteric fracture of proximal end of left femur ([facility])   • Osteoporosis with pathological fracture   • Chronic diastolic congestive heart failure ([facility])     Past Medical History:   Diagnosis Date   • Anxiety    • Arterial thrombosis ([facility])    • Arthritis    • Atrial fibrillation ([facility])    • Headache    • Heart palpitations    • Migraine    • Syncope    • UTI (urinary tract infection)      Past Surgical History:   Procedure Laterality Date   • ANKLE SURGERY     • CARDIAC PACEMAKER PLACEMENT     • OTHER SURGICAL HISTORY Left     elbow surgery   • PACEMAKER IMPLANTATION         SLP Recommendation and Plan  SLP Swallowing Diagnosis: R/O pharyngeal dysphagia, esophageal dysphagia (22 1200)  SLP Diet Recommendation: NPO, other (see comments) (Ice chips sparingly following oral care) (22 1200)        Recommended Diagnostics: other (see comments) (Reconsult as warranted following GI consult)  (09/12/22 1200)  Swallow Criteria for Skilled Therapeutic Interventions Met: other (see comments) (Pending GI consult/findings) (09/12/22 1200)  Anticipated Discharge Disposition (SLP): skilled nursing facility (09/12/22 1200)     Therapy Frequency (Swallow): evaluation only (09/12/22 1200)     Demonstrates Need for Referral to Another Service: gastroenterology, dedicated esophageal assessment (09/12/22 1200)        Anticipated Discharge Disposition (SLP): skilled nursing facility (09/12/22 1200)     Demonstrates Need for Referral to Another Service: gastroenterology, dedicated esophageal assessment (09/12/22 1200)        Plan of Care Reviewed With: patient, other (see comments) (RN) (09/12/22 1207)  Outcome Evaluation: Clincal swallow evaluation completed. Patient reports progressive decline in swallow function over the last several months, worse after surgery on 9/9/22. She reports regurgitation of both solids and liquids and that material sticks, localizes to level of suprasternal notch. Laryngeal elevation and swallow timing appears adequate upon palpation. Delayed throat clearing with single ice chips. Immediate coughing with 1/4 teaspoon of puree. Suspected esophageal and/or pharyngeal dysphagia. Rec: NPO except ice chips sparingly following oral care. Non-oral meds. Frequent oral care. GI to further assess structure/function of esophagus. SLP to sign off at this time. Please reconsult as warrranted following GI consult. (09/12/22 1207)     Patient was not wearing a face mask during this therapy encounter. Therapist used appropriate personal protective equipment including mask, eye protection and gloves.  Mask used was standard procedure mask. Appropriate PPE was worn during the entire therapy session. Hand hygiene was completed before and after therapy session. Patient is not in enhanced droplet precautions.     SWALLOW EVALUATION (last 72 hours)     SLP Adult Swallow Evaluation     Row Name 09/12/22 1200                    Rehab Evaluation    Document Type evaluation  -HS        Subjective Information complains of  sore throat and dysphagia  -HS        Patient Observations alert;cooperative  -HS        Patient Effort good  -HS        Symptoms Noted During/After Treatment none  -HS                  General Information    Patient Profile Reviewed yes  -HS        Pertinent History Of Current Problem Fall with left femur fracture s/p surgery on 9/9/22. Patient reports dificulty swallowing for several months that has been progressive in nature. She also states she has a sore throat since surgery a few days ago. CXR 9/7/22: no focal pulmonary consolidation.  -HS        Current Method of Nutrition regular textures;thin liquids;other (see comments)  Has been refusing PO d/t above complaints  -HS        Precautions/Limitations, Vision WFL;for purposes of eval  -HS        Precautions/Limitations, Hearing WFL;for purposes of eval  -HS        Prior Level of Function-Communication WFL  -HS        Prior Level of Function-Swallowing no diet consistency restrictions;esophageal concerns  -HS        Plans/Goals Discussed with patient  -HS        Barriers to Rehab none identified  -HS        Patient's Goals for Discharge eat/drink without coughing/choking  -HS                  Oral Motor Structure and Function    Dentition Assessment natural, present and adequate  -HS        Secretion Management WNL/WFL  -HS        Mucosal Quality moist, healthy  -HS        Volitional Swallow WFL  -HS                  Oral Musculature and Cranial Nerve Assessment    Oral Motor General Assessment WFL  -HS                  General Eating/Swallowing Observations    Eating/Swallowing Skills fed by SLP  -HS        Positioning During Eating upright in bed  -HS        Utensils Used spoon  -HS        Consistencies Trialed ice chips;pureed  -HS                  Clinical Swallow Eval    Clinical Swallow Evaluation Summary Clincal swallow evaluation completed.  Patient reports progressive decline in swallow function over the last several months, worse after surgery on 9/9/22. She reports regurgitation of both solids and liquids and that material sticks, localizes to level of suprasternal notch. Laryngeal elevation and swallow timing appears adequate upon palpation. Delayed throat clearing with single ice chips. Immediate coughing with 1/4 teaspoon of puree. Suspected esophageal and/or pharyngeal dysphagia.  -                  SLP Evaluation Clinical Impression    SLP Swallowing Diagnosis R/O pharyngeal dysphagia;esophageal dysphagia  -HS        Functional Impact risk of aspiration/pneumonia;risk of malnutrition;risk of dehydration  -HS        Swallow Criteria for Skilled Therapeutic Interventions Met other (see comments)  Pending GI consult/findings  -HS                  Recommendations    Therapy Frequency (Swallow) evaluation only  -        SLP Diet Recommendation NPO;other (see comments)  Ice chips sparingly following oral care  -HS        Recommended Diagnostics other (see comments)  Reconsult as warranted following GI consult  -        Oral Care Recommendations Oral Care BID/PRN  -        SLP Rec. for Method of Medication Administration meds via alternate route  -HS        Anticipated Discharge Disposition (SLP) skilled nursing facility  -HS        Demonstrates Need for Referral to Another Service gastroenterology;dedicated esophageal assessment  -              User Key  (r) = Recorded By, (t) = Taken By, (c) = Cosigned By    Initials Name Effective Dates     Casie Johnson, MS CCC-SLP 06/08/18 -                 EDUCATION  The patient has been educated in the following areas:   Dysphagia (Swallowing Impairment) Oral Care/Hydration NPO rationale.       Time Calculation:    Time Calculation- SLP     Row Name 09/12/22 1240             Time Calculation- SLP    SLP Start Time 1045  -      SLP Received On 09/12/22  -              Untimed Charges    SLP  Eval/Re-eval  ST Eval Oral Pharyng Swallow - 84168  -HS      [id number] Eval Oral Pharyng Swallow Minutes 60  -HS              Total Minutes    Untimed Charges Total Minutes 60  -HS       Total Minutes 60  -HS            User Key  (r) = Recorded By, (t) = Taken By, (c) = Cosigned By    Initials Name Provider Type     Casie Johnson MS CCC-SLP Speech and Language Pathologist                Therapy Charges for Today     Code Description Service Date Service Provider Modifiers Qty    [id number]  ST EVAL ORAL PHARYNG SWALLOW 4 9/12/2022 Casie Johnson MS CCC-SLP GN 1               SLP Discharge Summary  Anticipated Discharge Disposition (SLP): skilled nursing facility    Casie Johnson MS CCC-SLP  9/12/2022

## 2022-09-12 NOTE — PLAN OF CARE
Goal Outcome Evaluation: Patient worked with PT today. Morphine given x 2 doses this shift for c/o left hip pain and back pain 9/10. Turned every 2 hours for comfort.  Ice pack in place to left hip.  NPO with ice chips at this time. See GI note and orders. Needs met at this time and call light in reach.

## 2022-09-13 ENCOUNTER — APPOINTMENT (OUTPATIENT)
Dept: GENERAL RADIOLOGY | Facility: HOSPITAL | Age: 87
End: 2022-09-13

## 2022-09-13 PROCEDURE — 25010000002 ENOXAPARIN PER 10 MG: Performed by: ORTHOPAEDIC SURGERY

## 2022-09-13 PROCEDURE — 99232 SBSQ HOSP IP/OBS MODERATE 35: CPT | Performed by: INTERNAL MEDICINE

## 2022-09-13 PROCEDURE — 25010000002 MORPHINE PER 10 MG: Performed by: ORTHOPAEDIC SURGERY

## 2022-09-13 PROCEDURE — 25010000002 DIGOXIN PER 500 MCG: Performed by: INTERNAL MEDICINE

## 2022-09-13 PROCEDURE — 74230 X-RAY XM SWLNG FUNCJ C+: CPT

## 2022-09-13 PROCEDURE — 97530 THERAPEUTIC ACTIVITIES: CPT

## 2022-09-13 PROCEDURE — 99231 SBSQ HOSP IP/OBS SF/LOW 25: CPT | Performed by: NURSE PRACTITIONER

## 2022-09-13 PROCEDURE — 99024 POSTOP FOLLOW-UP VISIT: CPT | Performed by: ORTHOPAEDIC SURGERY

## 2022-09-13 PROCEDURE — 97110 THERAPEUTIC EXERCISES: CPT

## 2022-09-13 PROCEDURE — 92611 MOTION FLUOROSCOPY/SWALLOW: CPT

## 2022-09-13 RX ADMIN — BARIUM SULFATE 55 ML: 0.81 POWDER, FOR SUSPENSION ORAL at 08:21

## 2022-09-13 RX ADMIN — MORPHINE SULFATE 2 MG: 2 INJECTION, SOLUTION INTRAMUSCULAR; INTRAVENOUS at 21:03

## 2022-09-13 RX ADMIN — DIGOXIN 125 MCG: 0.25 INJECTION INTRAMUSCULAR; INTRAVENOUS at 12:48

## 2022-09-13 RX ADMIN — MORPHINE SULFATE 2 MG: 2 INJECTION, SOLUTION INTRAMUSCULAR; INTRAVENOUS at 17:13

## 2022-09-13 RX ADMIN — MORPHINE SULFATE 2 MG: 2 INJECTION, SOLUTION INTRAMUSCULAR; INTRAVENOUS at 09:18

## 2022-09-13 RX ADMIN — ENOXAPARIN SODIUM 80 MG: 100 INJECTION SUBCUTANEOUS at 09:19

## 2022-09-13 RX ADMIN — METOPROLOL TARTRATE 5 MG: 1 INJECTION, SOLUTION INTRAVENOUS at 09:18

## 2022-09-13 RX ADMIN — MORPHINE SULFATE 2 MG: 2 INJECTION, SOLUTION INTRAMUSCULAR; INTRAVENOUS at 00:56

## 2022-09-13 RX ADMIN — METOPROLOL TARTRATE 5 MG: 1 INJECTION, SOLUTION INTRAVENOUS at 14:44

## 2022-09-13 RX ADMIN — SODIUM CHLORIDE, POTASSIUM CHLORIDE, SODIUM LACTATE AND CALCIUM CHLORIDE 100 ML/HR: 600; 310; 30; 20 INJECTION, SOLUTION INTRAVENOUS at 05:07

## 2022-09-13 RX ADMIN — METOPROLOL TARTRATE 5 MG: 1 INJECTION, SOLUTION INTRAVENOUS at 04:07

## 2022-09-13 RX ADMIN — MORPHINE SULFATE 2 MG: 2 INJECTION, SOLUTION INTRAMUSCULAR; INTRAVENOUS at 06:16

## 2022-09-13 RX ADMIN — BARIUM SULFATE 50 ML: 400 SUSPENSION ORAL at 08:15

## 2022-09-13 RX ADMIN — ENOXAPARIN SODIUM 80 MG: 100 INJECTION SUBCUTANEOUS at 21:03

## 2022-09-13 RX ADMIN — MORPHINE SULFATE 2 MG: 2 INJECTION, SOLUTION INTRAMUSCULAR; INTRAVENOUS at 13:08

## 2022-09-13 RX ADMIN — METOPROLOL TARTRATE 5 MG: 1 INJECTION, SOLUTION INTRAVENOUS at 21:03

## 2022-09-13 RX ADMIN — BARIUM SULFATE 50 ML: 400 SUSPENSION ORAL at 08:20

## 2022-09-13 NOTE — MBS/VFSS/FEES
Acute Care - Speech Language Pathology   Swallow Initial Evaluation [facility]     Patient Name: Brandy Han  : 1930  MRN: [id number]  Today's Date: 2022               Admit Date: 2022    Visit Dx:     ICD-10-CM ICD-9-CM   1. Closed comminuted intertrochanteric fracture of left femur, initial encounter ([facility])  S72.142A 820.21   2. Atrial fibrillation with RVR ([facility])  I48.91 427.31     Patient Active Problem List   Diagnosis   • History of venous thrombosis   • Bilateral carotid artery stenosis   • Osteoporosis   • Hyperlipidemia   • Right-sided low back pain with right-sided sciatica   • Sinus arrest   • Presence of cardiac pacemaker   • B12 deficiency   • Chronic pain of both knees   • Non-traumatic rhabdomyolysis   • Closed traumatic nondisplaced fracture of sixth cervical vertebra ([facility])   • Closed nondisplaced fracture of left lateral portion of seventh cervical vertebra ([facility])   • Vitamin D deficiency   • Paroxysmal atrial fibrillation ([facility])   • Overactive bladder   • Closed comminuted intertrochanteric fracture of proximal end of left femur ([facility])   • Osteoporosis with pathological fracture   • Chronic diastolic congestive heart failure ([facility])     Past Medical History:   Diagnosis Date   • Anxiety    • Arterial thrombosis ([facility])    • Arthritis    • Atrial fibrillation ([facility])    • Headache    • Heart palpitations    • Migraine    • Syncope    • UTI (urinary tract infection)      Past Surgical History:   Procedure Laterality Date   • ANKLE SURGERY     • CARDIAC PACEMAKER PLACEMENT     • OTHER SURGICAL HISTORY Left     elbow surgery   • PACEMAKER IMPLANTATION         SLP Recommendation and Plan  SLP Swallowing Diagnosis: moderate, pharyngeal dysphagia (22 0800)  SLP Diet Recommendation: puree, thin liquids, nutritional supplements needed (22 0800)  Recommended Precautions and Strategies: upright posture during/after eating, multiple swallows per bite of food, small bites of food and sips  of liquid, alternate between small bites of food and sips of liquid, hard swallow with each bite or sip, reflux precautions, fatigue precautions (09/13/22 0800)  SLP Rec. for Method of Medication Administration: meds crushed, with pudding or applesauce, meds via alternate route (09/13/22 0800)     Monitor for Signs of Aspiration: yes, notify SLP if any concerns, throat clearing (09/13/22 0800)  Recommended Diagnostics: reassess via clinical swallow evaluation, reassess via VFSS ([facility]) (09/13/22 0800)  Swallow Criteria for Skilled Therapeutic Interventions Met: demonstrates skilled criteria (09/13/22 0800)  Anticipated Discharge Disposition (SLP): skilled nursing facility (09/13/22 0800)  Rehab Potential/Prognosis, Swallowing: adequate, monitor progress closely (09/13/22 0800)  Therapy Frequency (Swallow): PRN (09/13/22 0800)  Predicted Duration Therapy Intervention (Days): until discharge (09/13/22 0800)                                  Plan of Care Reviewed With: (P) patient  Outcome Evaluation: VFSS completed. See full report for details. Patient presents with moderate oropharyngeal dysphagia characterized by decreased coordination of swallow function, prolonged mastication, decreased hyolaryngeal excursion, and significant pharyngeal weakness/ decreased pharyngeal constriction. Study was limited due to poor intake.  Concern for patient's ability to meet nutritional needs via p.o. intake. Recommend trial diet of puree/ thin liquids via cup; medication crushed with puree. Precautions: upright, multiple swallows per bite, alternate solids with liquids, no straw, oral care. Make NPO with any overt signs or symptoms of aspiration or with negative pulmonary changes.  SLP to follow for diet tolerance.      SWALLOW EVALUATION (last 72 hours)     SLP Adult Swallow Evaluation     Row Name 09/13/22 0800 09/12/22 1200                Rehab Evaluation    Document Type evaluation  -SR evaluation  -HS       Subjective Information  no complaints  -SR complains of  sore throat and dysphagia  -HS       Patient Observations alert;cooperative  -SR alert;cooperative  -HS       Patient/Family/Caregiver Comments/Observations Patient required verbal encouragement to complete swallow study this date. Study limited due to poor intake.  -SR --       Patient Effort adequate  -SR good  -HS       Symptoms Noted During/After Treatment none  -SR none  -HS                General Information    Patient Profile Reviewed yes  -SR yes  -HS       Pertinent History Of Current Problem Presented to hospital following a fall with left femur fracture. S/p surgery 9/9/22. Per chart, patient reported difficulty swallowing for several months that has been progressive in nature. Poor p.o. intake.  -SR Fall with left femur fracture s/p surgery on 9/9/22. Patient reports dificulty swallowing for several months that has been progressive in nature. She also states she has a sore throat since surgery a few days ago. CXR 9/7/22: no focal pulmonary consolidation.  -HS       Current Method of Nutrition NPO  -SR regular textures;thin liquids;other (see comments)  Has been refusing PO d/t above complaints  -HS       Precautions/Limitations, Vision WFL;for purposes of eval  -SR WFL;for purposes of eval  -HS       Precautions/Limitations, Hearing WFL;for purposes of eval  -SR WFL;for purposes of eval  -HS       Prior Level of Function-Communication WFL  -SR WFL  -HS       Prior Level of Function-Swallowing no diet consistency restrictions;esophageal concerns  -SR no diet consistency restrictions;esophageal concerns  -HS       Plans/Goals Discussed with patient  -SR patient  -HS       Barriers to Rehab medically complex  -SR none identified  -HS       Patient's Goals for Discharge -- eat/drink without coughing/choking  -HS                Pain Scale: Numbers Pre/Post-Treatment    Pretreatment Pain Rating 0/10 - no pain  -SR --                Oral Motor Structure and Function    Dentition  Assessment natural, present and adequate  -SR natural, present and adequate  -HS       Secretion Management WNL/WFL  -SR WNL/WFL  -HS       Mucosal Quality dry  -SR moist, healthy  -HS       Volitional Swallow -- WFL  -HS                Oral Musculature and Cranial Nerve Assessment    Oral Motor General Assessment generalized oral motor weakness  -SR WFL  -HS                General Eating/Swallowing Observations    Eating/Swallowing Skills -- fed by SLP  -HS       Positioning During Eating -- upright in bed  -HS       Utensils Used -- spoon  -HS       Consistencies Trialed -- ice chips;pureed  -HS                Clinical Swallow Eval    Clinical Swallow Evaluation Summary -- Clincal swallow evaluation completed. Patient reports progressive decline in swallow function over the last several months, worse after surgery on 9/9/22. She reports regurgitation of both solids and liquids and that material sticks, localizes to level of suprasternal notch. Laryngeal elevation and swallow timing appears adequate upon palpation. Delayed throat clearing with single ice chips. Immediate coughing with 1/4 teaspoon of puree. Suspected esophageal and/or pharyngeal dysphagia.  -HS                MBS/VFSS    Utensils Used spoon;cup  -SR --       Consistencies Trialed soft textures;pureed;thin liquids;nectar/syrup-thick liquids;honey-thick liquids  -SR --                MBS/VFSS Interpretation    Oral Prep Phase impaired oral phase of swallowing  -SR --       VFSS Summary VFSS completed with radiologist present, Dr. Ayala. Patient presents with moderate oropharyngeal dysphagia characterized by decreased coordination of swallow function, prolonged mastication, decreased hyolaryngeal excursion, and significant pharyngeal weakness/ decreased pharyngeal constriction. Study was limited due to poor intake. Patient exhibited no penetration or aspiration with honey via cup or nectar via spoon/cup/straw. Patient held nectar thick bolus in oral  cavity for extended time. Demonstrated multiple swallows for single bolus of nectar thick via spoon. No penetration or aspiration with thin via spoon or cup. Mild-moderate oral residue partially cleared with consecutive swallows. Reduced anterior-posterior transit time and prespill to the vallecula with puree. Moderate pharyngeal residue post swallow. Penetrated puree residue x1; patient sensed and independently performed throat clear. Required multiple swallow per single bolus of puree. Thin liquid wash partially successful in clearing residue.  Patient demonstrated prolonged mastication with soft consistency; mild oral residue post swallow. No penetration or aspiration.  Concern for patient's ability to meet nutritional needs via p.o. intake. Recommend trial diet of puree/ thin liquids via cup; medication crushed with puree. Precautions: upright, multiple swallows per bite, alternate solids with liquids, no straw, oral care. Make NPO with any overt signs or symptoms of aspiration or with negative pulmonary changes.  SLP to follow for diet tolerance.  -SR --                Oral Preparatory Phase    Oral Preparatory Phase prolonged manipulation  -SR --                Initiation of Pharyngeal Swallow    Pharyngeal Phase impaired pharyngeal phase of swallowing  -SR --                SLP Communication to Radiology    Summary Statement VFSS completed with radiologist present, Dr. Ayala. Patient presents with moderate oropharyngeal dysphagia characterized by decreased coordination of swallow function, prolonged mastication, decreased hyolaryngeal excursion, and significant pharyngeal weakness/ decreased pharyngeal constriction. Study was limited due to poor intake. Patient exhibited no penetration or aspiration with honey via cup or nectar via spoon/cup/straw. Patient held nectar thick bolus in oral cavity for extended time. Demonstrated multiple swallows for single bolus of nectar thick via spoon. No penetration or  aspiration with thin via spoon or cup. Mild-moderate oral residue partially cleared with consecutive swallows. Reduced anterior-posterior transit time and prespill to the vallecula with puree. Moderate pharyngeal residue post swallow. Penetrated puree residue x1; patient sensed and independently performed throat clear. Required multiple swallow per single bolus of puree. Thin liquid wash partially successful in clearing residue.  Patient demonstrated prolonged mastication with soft consistency; mild oral residue post swallow. No penetration or aspiration.  -SR --                SLP Evaluation Clinical Impression    SLP Swallowing Diagnosis moderate;pharyngeal dysphagia  -SR R/O pharyngeal dysphagia;esophageal dysphagia  -HS       Functional Impact risk of aspiration/pneumonia;risk of malnutrition;risk of dehydration  -SR risk of aspiration/pneumonia;risk of malnutrition;risk of dehydration  -HS       Rehab Potential/Prognosis, Swallowing adequate, monitor progress closely  -SR --       Swallow Criteria for Skilled Therapeutic Interventions Met demonstrates skilled criteria  -SR other (see comments)  Pending GI consult/findings  -HS                Recommendations    Therapy Frequency (Swallow) PRN  -SR evaluation only  -HS       Predicted Duration Therapy Intervention (Days) until discharge  -SR --       SLP Diet Recommendation puree;thin liquids;nutritional supplements needed  -SR NPO;other (see comments)  Ice chips sparingly following oral care  -       Recommended Diagnostics reassess via clinical swallow evaluation;reassess via VFSS ([facility])  -SR other (see comments)  Reconsult as warranted following GI consult  -HS       Recommended Precautions and Strategies upright posture during/after eating;multiple swallows per bite of food;small bites of food and sips of liquid;alternate between small bites of food and sips of liquid;hard swallow with each bite or sip;reflux precautions;fatigue precautions  -SR --        Oral Care Recommendations Oral Care BID/PRN  -SR Oral Care BID/PRN  -HS       SLP Rec. for Method of Medication Administration meds crushed;with pudding or applesauce;meds via alternate route  -SR meds via alternate route  -       Monitor for Signs of Aspiration yes;notify SLP if any concerns;throat clearing  -SR --       Anticipated Discharge Disposition (SLP) skilled nursing facility  -SR skilled nursing facility  -HS       Demonstrates Need for Referral to Another Service -- gastroenterology;dedicated esophageal assessment  -HS                Swallow Goals (SLP)    Swallow LTGs Patient will demonstrate functional swallow for  -SR --       Swallow STGs diet tolerance goal selection (SLP)  -SR --                (LTG) Patient will demonstrate functional swallow for    Diet Texture (Demonstrate functional swallow) pureed textures  -SR --       Liquid viscosity (Demonstrate functional swallow) thin liquids  -SR --       [place] (Demonstrate functional swallow) independently (over 90% accuracy)  -SR --       Time Frame (Demonstrate functional swallow) by discharge  -SR --             User Key  (r) = Recorded By, (t) = Taken By, (c) = Cosigned By    Initials Name Effective Dates     Casie Johnson, MS CCC-SLP 06/08/18 -     SR Klarissa Rivero SLP 01/12/22 -                 EDUCATION  The patient has been educated in the following areas:   Dysphagia (Swallowing Impairment) Oral Care/Hydration.        SLP GOALS     Row Name 09/13/22 0800             (LTG) Patient will demonstrate functional swallow for    Diet Texture (Demonstrate functional swallow) pureed textures  -SR      Liquid viscosity (Demonstrate functional swallow) thin liquids  -SR      [place] (Demonstrate functional swallow) independently (over 90% accuracy)  -SR      Time Frame (Demonstrate functional swallow) by discharge  -SR            User Key  (r) = Recorded By, (t) = Taken By, (c) = Cosigned By    Initials Name Provider Type    SR  R[place]ford, Klarissa, SLP Speech and Language Pathologist                   Time Calculation:    Time Calculation- SLP     Row Name 09/13/22 1537             Time Calculation- SLP    SLP Start Time 0800  -SR      SLP Received On 09/13/22  -SR              Untimed Charges    69700-WF Motion Fluoro Eval Swallow Minutes 90  -SR              Total Minutes    Untimed Charges Total Minutes 90  -SR       Total Minutes 90  -SR            User Key  (r) = Recorded By, (t) = Taken By, (c) = Cosigned By    Initials Name Provider Type    SR Klarissa Rivero SLP Speech and Language Pathologist                Therapy Charges for Today     Code Description Service Date Service Provider Modifiers Qty    64936398000 HC ST MOTION FLUORO EVAL SWALLOW 6 9/13/2022 Klarissa Rivero SLP GN 1               MIKE Shea  9/13/2022

## 2022-09-13 NOTE — THERAPY TREATMENT NOTE
Patient Name: Brandy Han  : 1930    MRN: [id number]                              Today's Date: 2022       Admit Date: 2022    Visit Dx:     ICD-10-CM ICD-9-CM   1. Closed comminuted intertrochanteric fracture of left femur, initial encounter ([facility])  S72.142A 820.21   2. Atrial fibrillation with RVR ([facility])  I48.91 427.31     Patient Active Problem List   Diagnosis   • History of venous thrombosis   • Bilateral carotid artery stenosis   • Osteoporosis   • Hyperlipidemia   • Right-sided low back pain with right-sided sciatica   • Sinus arrest   • Presence of cardiac pacemaker   • B12 deficiency   • Chronic pain of both knees   • Non-traumatic rhabdomyolysis   • Closed traumatic nondisplaced fracture of sixth cervical vertebra ([facility])   • Closed nondisplaced fracture of left lateral portion of seventh cervical vertebra ([facility])   • Vitamin D deficiency   • Paroxysmal atrial fibrillation ([facility])   • Overactive bladder   • Closed comminuted intertrochanteric fracture of proximal end of left femur ([facility])   • Osteoporosis with pathological fracture   • Chronic diastolic congestive heart failure ([facility])     Past Medical History:   Diagnosis Date   • Anxiety    • Arterial thrombosis ([facility])    • Arthritis    • Atrial fibrillation ([facility])    • Headache    • Heart palpitations    • Migraine    • Syncope    • UTI (urinary tract infection)      Past Surgical History:   Procedure Laterality Date   • ANKLE SURGERY     • CARDIAC PACEMAKER PLACEMENT     • OTHER SURGICAL HISTORY Left     elbow surgery   • PACEMAKER IMPLANTATION        General Information     Row Name 22 1205          Physical Therapy Time and Intention    Document Type therapy note (daily note)  -     Mode of Treatment physical therapy  -     Row Name 22 1205          General Information    Existing Precautions/Restrictions fall  -     Row Name 22 1205          Cognition    Orientation Status (Cognition) oriented x 3  -           User Key   (r) = Recorded By, (t) = Taken By, (c) = Cosigned By    Initials Name Provider Type    Klarissa Nascimento PTA Physical Therapist Assistant               Mobility     Row Name 09/13/22 1207          Bed Mobility    Bed Mobility supine-sit;sit-supine  -     Supine-Sit [place] (Bed Mobility) maximum assist (25% patient effort);2 person assist  -     Sit-Supine [place] (Bed Mobility) maximum assist (25% patient effort);2 person assist  -     Assistive Device (Bed Mobility) bed rails;draw sheet;head of bed elevated  -     Row Name 09/13/22 1207          Sit-Stand Transfer    Sit-Stand [place] (Transfers) maximum assist (25% patient effort);2 person assist  -     Assistive Device (Sit-Stand Transfers) walker, front-wheeled  -     Comment, (Sit-Stand Transfer) stood twice, clearing bottom off bed both times  -           User Key  (r) = Recorded By, (t) = Taken By, (c) = Cosigned By    Initials Name Provider Type    Klarissa Nascimento PTA Physical Therapist Assistant               Obj/Interventions     Row Name 09/13/22 1208          Motor Skills    Therapeutic Exercise --  seated AP and LAQ x5 reps, unable to DF on L foot  -           User Key  (r) = Recorded By, (t) = Taken By, (c) = Cosigned By    Initials Name Provider Type    Klarissa Nascimento PTA Physical Therapist Assistant               Goals/Plan    No documentation.                Clinical Impression     Row Name 09/13/22 1209          Pain    Pretreatment Pain Rating 7/10  -SM     Posttreatment Pain Rating 9/10  -SM     Pre/Posttreatment Pain Comment low back, L hip, and R knee  -     Pain Intervention(s) Repositioned;Ambulation/increased activity;Rest  -     Row Name 09/13/22 1209          Positioning and Restraints    Pre-Treatment Position in bed  -     Post Treatment Position bed  -SM     In Bed supine;call light within reach;encouraged to call for assist;exit alarm on;notified nsg  -           User Key   (r) = Recorded By, (t) = Taken By, (c) = Cosigned By    Initials Name Provider Type     Klarissa Gilmore PTA Physical Therapist Assistant               Outcome Measures     Row Name 09/13/22 1209          How much help from another person do you currently need...    Turning from your back to your side while in flat bed without using bedrails? 2  -SM     Moving from lying on back to sitting on the side of a flat bed without bedrails? 2  -SM     Moving to and from a bed to a chair (including a wheelchair)? 1  -SM     Standing up from a chair using your arms (e.g., wheelchair, bedside chair)? 2  -SM     Climbing 3-5 steps with a railing? 1  -SM     To walk in hospital room? 1  -SM     AM-PAC 6 Clicks Score (PT) 9  -SM     Highest level of mobility 3 --> Sat at edge of bed  -     Row Name 09/13/22 1209          Functional Assessment    Outcome Measure Options AM-PAC 6 Clicks Basic Mobility (PT)  -           User Key  (r) = Recorded By, (t) = Taken By, (c) = Cosigned By    Initials Name Provider Type    Klarissa Nascimento PTA Physical Therapist Assistant                             Physical Therapy Education                 Title: PT OT SLP Therapies (Done)     Topic: Physical Therapy (Done)     Point: Mobility training (Done)     Learning Progress Summary           Patient Acceptance, E,TB,D, VU,NR by  at 9/13/2022 1210    Acceptance, E,TB,D, VU,NR by  at 9/12/2022 1519    Acceptance, E,TB,D, VU,NR by  at 9/10/2022 1642                   Point: Home exercise program (Done)     Learning Progress Summary           Patient Acceptance, E,TB,D, VU,NR by  at 9/13/2022 1210    Acceptance, E,TB,D, VU,NR by  at 9/12/2022 1519    Acceptance, E,TB,D, VU,NR by  at 9/10/2022 1642                   Point: Body mechanics (Done)     Learning Progress Summary           Patient Acceptance, E,TB,D, VU,NR by  at 9/13/2022 1210    Acceptance, E,TB,D, VU,NR by  at 9/12/2022 1519    Acceptance, E,TB,D, VU,NR by   at 9/10/2022 1642                   Point: Precautions (Done)     Learning Progress Summary           Patient Acceptance, E,TB,D, VU,NR by  at 9/13/2022 1210    Acceptance, E,TB,D, VU,NR by  at 9/12/2022 1519    Acceptance, E,TB,D, VU,NR by  at 9/10/2022 1642                               User Key     Initials Effective Dates Name Provider Type Discipline     06/16/21 -  Priyanka Garcia PT Physical Therapist PT     03/07/18 -  Klarissa Gilmore PTA Physical Therapist Assistant PT              PT Recommendation and Plan     Plan of Care Reviewed With: patient  Progress: improving  Outcome Evaluation: Pt tolerated treatment fair this date. Required max A x2 for bed mobility and to stand. Pt able to complete 2 full stands, clearing bottom off bed each time, though limited d/t fatigue and pain. Pt c/o pain in low back, L hip and R knee. Still no DF noted in L foot, though unable to use AFO d/t no shoes. Requested pt to get ahold of family to bring in pair of tennis shoes, and RN notified as well.     Time Calculation:    PT Charges     Row Name 09/13/22 1213             Time Calculation    Start Time 1110  -      Stop Time 1133  -      Time Calculation (min) 23 min  -      PT Received On 09/13/22  -      PT - Next Appointment 09/14/22  -            User Key  (r) = Recorded By, (t) = Taken By, (c) = Cosigned By    Initials Name Provider Type     Klarissa Gilmore PTA Physical Therapist Assistant              Therapy Charges for Today     Code Description Service Date Service Provider Modifiers Qty    15236453997 HC PT THER PROC EA 15 MIN 9/12/2022 Klarissa Gilmore, PTA GP 1    48386118839 HC PT THERAPEUTIC ACT EA 15 MIN 9/12/2022 Klarissa Gilmore, PTA GP 1    88449998782 HC PT THER SUPP EA 15 MIN 9/12/2022 Klarissa Gilmore, UZIEL GP 2    47419714161 HC PT THER PROC EA 15 MIN 9/13/2022 Klarissa Gilmore, PTA GP 1    65044520750 HC PT THERAPEUTIC ACT EA 15 MIN 9/13/2022 M[place]  Klarissa Aguilar PTA GP 1    68341275552 HC PT THER SUPP EA 15 MIN 9/13/2022 Klarissa Gilmore, UZIEL GP 2          PT G-Codes  Outcome Measure Options: AM-PAC 6 Clicks Basic Mobility (PT)  AM-PAC 6 Clicks Score (PT): 9    Klarissa Gilmore PTA  9/13/2022

## 2022-09-13 NOTE — PROGRESS NOTES
Name: Brandy Han ADMIT: 2022   : 1930  PCP: Rupal Sutton MD    MRN: [id number] LOS: 6 days   AGE/SEX: 89 y.o. female  ROOM: [facility]     Subjective   Subjective   Patient seen at bedside.       Objective   Objective   Vital Signs  Temp:  [98.2 °F (36.8 °C)-98.6 °F (37 °C)] 98.6 °F (37 °C)  Heart Rate:  [] 90  Resp:  [16] 16  BP: (105-120)/(61-79) 115/61  SpO2:  [94 %-99 %] 97 %  on  Flow (L/min):  [2] 2;   Device (Oxygen Therapy): room air  Body mass index is 26.59 kg/m².  Physical Exam   General, awake and alert.  Head and ENT, normocephalic and atraumatic.  Lungs, symmetric expansion, equal air entry bilaterally.  Heart, irregularly irregular  Abdomen, soft and nontender.  Extremities, no clubbing or cyanosis.  Neuro, no focal deficits.  Skin: Warm and no rash.  Psych, normal mood and affect.  Musculoskeletal, joint examination is grossly normal.       Results Review     I reviewed the patient's new clinical results.  Results from last 7 days   Lab Units 09/12/22  0528 09/11/22  0713 09/10/22  10022  1145   WBC 10*3/mm3 6.87 6.65 6.62 8.46   HEMOGLOBIN g/dL 8.7* 8.5* 8.9* 10.8*   PLATELETS 10*3/mm3 136* 113* 95* 116*     Results from last 7 days   Lab Units 09/12/22  0528 09/11/22  0713 09/10/22  10022  1145   SODIUM mmol/L 136 134* 133* 135*   POTASSIUM mmol/L 4.2 4.0 4.0 3.9   CHLORIDE mmol/L 101 102 101 104   CO2 mmol/L 23.0 24.0 23.0 19.8*   BUN mg/dL    CREATININE mg/dL 0.64 0.60 0.71 0.78   GLUCOSE mg/dL 80 84 93 101*   EGFR mL/min/1.73 83.0 84.3 79.9 71.4     Results from last 7 days   Lab Units 22  0528 22  1414   ALBUMIN g/dL 2.20* 3.40*   BILIRUBIN mg/dL 1.0 1.7*   ALK PHOS U/L 54 69   AST (SGOT) U/L 20 24   ALT (SGPT) U/L 6 8     Results from last 7 days   Lab Units 22  0528 22  0713 09/10/22  1001 22  1145 22  0634 22  1414   CALCIUM mg/dL 8.1* 7.8* 7.8* 8.1*   < > 8.7   ALBUMIN g/dL 2.20*  --   --   --   --   3.40*    < > = values in this interval not displayed.       No results found for: HGBA1C, POCGLU    No radiology results for the last day  Scheduled Medications  atorvastatin, 20 mg, Oral, Daily  digoxin, 125 mcg, Intravenous, Daily  enoxaparin, 1 mg/kg, Subcutaneous, Q12H  metoprolol tartrate, 5 mg, Intravenous, Q6H  polyethylene glycol, 17 g, Oral, Daily  vitamin B-12, 1,000 mcg, Oral, Daily  vitamin D, 50,000 Units, Oral, Q7 Days    Infusions  lactated ringers, 9 mL/hr  lactated ringers, 9 mL/hr, Last Rate: 100 mL/hr (09/09/22 0744)  lactated ringers, 100 mL/hr, Last Rate: 100 mL/hr (09/13/22 0507)    Diet  Diet Pureed; Thin       Assessment/Plan     Active Hospital Problems    Diagnosis  POA   • **Closed comminuted intertrochanteric fracture of proximal end of left femur (HCC) [S72.142A]  Yes   • Osteoporosis with pathological fracture [M80.00XA]  Yes   • Chronic diastolic congestive heart failure (HCC) [I50.32]  Yes   • Vitamin D deficiency [E55.9]  Yes   • Paroxysmal atrial fibrillation (HCC) [I48.0]  Yes   • B12 deficiency [E53.8]  Yes      Resolved Hospital Problems   No resolved problems to display.       89 y.o. female admitted with Closed comminuted intertrochanteric fracture of proximal end of left femur (HCC).    Assessment and plan:  1.  Closed comminuted intertrochanteric fracture of proximal end of left femur, status post cephalomedullary fixation of left hip fracture, orthopedics on board.  Continue weightbearing as tolerated.  Encourage ambulation with physical therapy.     2.  History of osteoporosis, patient on vitamin D supplementation.     3.  Paroxysmal atrial fibrillation, patient is seen by cardiology, she is rate controlled, she is on digoxin as well as full dose Lovenox.     4.  Hyperlipidemia, continue statin therapy.     5.  Dysphagia, GI evaluation, follow speech therapy recommendations.     6.  CODE STATUS is full code.  Further plans based on hospital course.  Patient will need rehab  placement upon discharge.  I believe she would benefit from palliative care evaluation.       Frankie Olson MD  [facility]  09/13/22  17:07 EDT

## 2022-09-13 NOTE — CASE MANAGEMENT/SOCIAL WORK
Continued Stay Note  [facility]     Patient Name: Brandy Han  MRN: [id number]  Today's Date: 9/13/2022    Admit Date: 9/7/2022     Discharge Plan     Row Name 09/13/22 1307       Plan    Plan [facility] - bed available on Thursday    Patient/Family in Agreement with Plan yes    Plan Comments Spoke with Aziza/Estefany Bartholomew unsure if bed will be available.  Spoke with Alem/[facility] should have a bed available on Thursday.  Spoke with daughter Lisa - she prefers [facility] due to location.  L'Nard Splint and AFO brace were delivered to patient's room.  CCP continues to follow.  Packet in CCP office.  Sybil GLASS                Expected Discharge Date and Time     Expected Discharge Date Expected Discharge Time    Sep 15, 2022             Becky S. [place], RN

## 2022-09-13 NOTE — NURSING NOTE
Called Daughter harris to request bringing in patients shoes so that she can use her ordered braces for her lower extremities when she is ambulating OOB; detailed message left on harris's answering machine and awaiting callback.

## 2022-09-13 NOTE — PROGRESS NOTES
[facility]  Inpatient Progress Note    Reason for Followup:  Dysphagia    Subjective     Interval History:   Remains weak, NPO for VFSS today    Current Facility-Administered Medications:   •  acetaminophen (TYLENOL) tablet 325 mg, 325 mg, Oral, Q4H PRN, Gene Welsh MD  •  acetaminophen (TYLENOL) tablet 650 mg, 650 mg, Oral, Q4H PRN, Gene Welsh MD  •  atorvastatin (LIPITOR) tablet 20 mg, 20 mg, Oral, Daily, Gene Welsh MD, 20 mg at 09/08/22 1121  •  digoxin (LANOXIN) injection 125 mcg, 125 mcg, Intravenous, Daily, Perla Mao MD, 125 mcg at 09/12/22 1113  •  docusate sodium (COLACE) capsule 100 mg, 100 mg, Oral, BID PRN, Gene Welsh MD  •  Enoxaparin Sodium (LOVENOX) syringe 80 mg, 1 mg/kg, Subcutaneous, Q12H, Gene Welsh MD, 80 mg at 09/12/22 2100  •  HYDROcodone-acetaminophen (NORCO) 5-325 MG per tablet 1 tablet, 1 tablet, Oral, Q4H PRN, Gene Welsh MD  •  lactated ringers infusion, 9 mL/hr, Intravenous, Continuous, Gene Welsh MD  •  lactated ringers infusion, 9 mL/hr, Intravenous, Continuous, Gene Welsh MD, Last Rate: 100 mL/hr at 09/09/22 0744, Restarted at 09/09/22 0935  •  lactated ringers infusion, 100 mL/hr, Intravenous, Continuous, Gene Welsh MD, Last Rate: 100 mL/hr at 09/13/22 0507, 100 mL/hr at 09/13/22 0507  •  metoprolol tartrate (LOPRESSOR) injection 5 mg, 5 mg, Intravenous, Q5 Min PRN, Gene Welsh MD  •  metoprolol tartrate (LOPRESSOR) injection 5 mg, 5 mg, Intravenous, Q6H, Perla Mao MD, 5 mg at 09/13/22 0407  •  morphine injection 2 mg, 2 mg, Intravenous, Q2H PRN, Gene Welsh MD, 2 mg at 09/13/22 0616  •  ondansetron (ZOFRAN) tablet 4 mg, 4 mg, Oral, Q6H PRN **OR** ondansetron (ZOFRAN) injection 4 mg, 4 mg, Intravenous, Q6H PRN, Gene Welsh MD  •  polyethylene glycol (MIRALAX) packet 17 g, 17 g, Oral, Daily, Gene Welsh MD, 17 g at 09/12/22 0811  •  vitamin B-12 (CYANOCOBALAMIN)  tablet 1,000 mcg, 1,000 mcg, Oral, Daily, Gene Welsh MD  •  vitamin D (ERGOCALCIFEROL) capsule 50,000 Units, 50,000 Units, Oral, Q7 Days, Gene Welsh MD  Review of Systems:    All systems were reviewed and negative except for:  Constitution:  positive for fatigue    Objective     Vital Signs  Temp:  [98.1 °F (36.7 °C)-98.4 °F (36.9 °C)] 98.2 °F (36.8 °C)  Heart Rate:  [86-97] 97  Resp:  [16] 16  BP: (105-125)/(64-71) 118/67  Body mass index is 26.59 kg/m².    Intake/Output Summary (Last 24 hours) at 9/13/2022 0750  Last data filed at 9/13/2022 0507  Gross per 24 hour   Intake 950 ml   Output 400 ml   Net 550 ml     No intake/output data recorded.     Physical Exam:   General: patient awake, alert and cooperative   Abdomen: soft, nontender, nondistended; normal bowel sounds   Rectal: deferred   Extremities: no rash or edema   Psychiatric: Normal mood and behavior; memory intact     Results Review:     I reviewed the patient's new clinical results.    Results from last 7 days   Lab Units 09/12/22  0528 09/11/22  0713 09/10/22  1001   WBC 10*3/mm3 6.87 6.65 6.62   HEMOGLOBIN g/dL 8.7* 8.5* 8.9*   HEMATOCRIT % 26.1* 25.7* 26.8*   PLATELETS 10*3/mm3 136* 113* 95*     Results from last 7 days   Lab Units 09/12/22  0528 09/11/22  0713 09/10/22  1001 09/08/22  0634 09/07/22  1414   SODIUM mmol/L 136 134* 133*   < > 137   POTASSIUM mmol/L 4.2 4.0 4.0   < > 4.1   CHLORIDE mmol/L 101 102 101   < > 102   CO2 mmol/L 23.0 24.0 23.0   < > 23.0   BUN mg/dL 21 19 21   < > 22   CREATININE mg/dL 0.64 0.60 0.71   < > 0.87   CALCIUM mg/dL 8.1* 7.8* 7.8*   < > 8.7   BILIRUBIN mg/dL 1.0  --   --   --  1.7*   ALK PHOS U/L 54  --   --   --  69   ALT (SGPT) U/L 6  --   --   --  8   AST (SGOT) U/L 20  --   --   --  24   GLUCOSE mg/dL 80 84 93   < > 110*    < > = values in this interval not displayed.         Lab Results   Lab Value Date/Time    LIPASE 87 (H) 04/01/2018 9608         Assessment & Plan   Assessment:   1.  Dysphagia  - oropharyngeal by description  2.  L femur fracture s/p operative repair  3.  Generalized weakness    Plan:   Await VFSS likely to be done today, further recommendations to follow based on those results    I discussed the patient's findings and my recommendations with patient.          Luis Enrique Jama M.D.  [facility]  [address] [place] [address]  Office: [phone]

## 2022-09-13 NOTE — PLAN OF CARE
Goal Outcome Evaluation:  Plan of Care Reviewed With: patient        Progress: improving  Outcome Evaluation: Pt tolerated treatment fair this date. Required max A x2 for bed mobility and to stand. Pt able to complete 2 full stands, clearing bottom off bed each time, though limited d/t fatigue and pain. Pt c/o pain in low back, L hip and R knee. Still no DF noted in L foot, though unable to use AFO d/t no shoes. Requested pt to get ahold of family to bring in pair of tennis shoes, and RN notified as well.

## 2022-09-13 NOTE — PLAN OF CARE
Goal Outcome Evaluation:  Plan of Care Reviewed With: (P) patient           Outcome Evaluation: VFSS completed. See full report for details. Patient presents with moderate oropharyngeal dysphagia characterized by decreased coordination of swallow function, prolonged mastication, decreased hyolaryngeal excursion, and significant pharyngeal weakness/ decreased pharyngeal constriction. Study was limited due to poor intake.  Concern for patient's ability to meet nutritional needs via p.o. intake. Recommend trial diet of puree/ thin liquids via cup; medication crushed with puree. Precautions: upright, multiple swallows per bite, alternate solids with liquids, no straw, oral care. Make NPO with any overt signs or symptoms of aspiration or with negative pulmonary changes.  SLP to follow for diet tolerance.    Patient was not wearing a face mask during this therapy encounter. Therapist used appropriate personal protective equipment including mask, eye protection and gloves.  Mask used was standard procedure mask. Appropriate PPE was worn during the entire therapy session. Hand hygiene was completed before and after therapy session. Patient is not in enhanced droplet precautions.

## 2022-09-13 NOTE — PLAN OF CARE
Goal Outcome Evaluation:  Plan of Care Reviewed With: patient        Progress: no change  Outcome Evaluation: Pt on RA, purewick in place. IVF continues. Pt c/o pain, treated with Dilaudid. NPO. AFib with HR controlled. Video swallow study today. VSS. Will continue to monitor.

## 2022-09-13 NOTE — PLAN OF CARE
Problem: Fall Injury Risk  Goal: Absence of Fall and Fall-Related Injury  9/13/2022 1813 by Ashley Velasco RN  Outcome: Ongoing, Progressing  9/13/2022 1810 by Ashley Velasco RN  Outcome: Ongoing, Progressing  Intervention: Identify and Manage Contributors  Recent Flowsheet Documentation  Taken 9/13/2022 1600 by Ashley Velasco RN  Medication Review/Management: medications reviewed  Taken 9/13/2022 1400 by Ashley Velasco RN  Medication Review/Management: medications reviewed  Taken 9/13/2022 1000 by Ashley Velasco RN  Medication Review/Management: medications reviewed  Taken 9/13/2022 0800 by Ashley Velasco RN  Medication Review/Management: medications reviewed  Intervention: Promote Injury-Free Environment  Recent Flowsheet Documentation  Taken 9/13/2022 1600 by Ashley Velasco RN  Safety Promotion/Fall Prevention:   activity supervised   clutter free environment maintained   fall prevention program maintained   safety round/check completed  Taken 9/13/2022 1400 by Ashley Velasco RN  Safety Promotion/Fall Prevention:   activity supervised   clutter free environment maintained   fall prevention program maintained   safety round/check completed  Taken 9/13/2022 1000 by Ashley Velasco RN  Safety Promotion/Fall Prevention:   activity supervised   fall prevention program maintained   safety round/check completed  Taken 9/13/2022 0800 by Ashley Velasco RN  Safety Promotion/Fall Prevention:   activity supervised   clutter free environment maintained   safety round/check completed   Goal Outcome Evaluation:

## 2022-09-13 NOTE — PROGRESS NOTES
Orthopedic Progress Note      Patient: Brandy Han  Date of Admission: 9/7/2022  YOB: 1930  Medical Record Number: [id number]    POD # :  4 Days Post-Op Procedure(s) (LRB):  LT. HIP NAIL (Left)    Patient seen and examined this morning.  States she still not able to swallow much.  She continues complain of some low back pain and she said previously she also states her neck bothers her at times during positioning and she has had that fractured in the past.  She states she is working on moving her left lower extremity some but feels that she is at her baseline as she had trouble removing her ankle and foot much even prior to her injury.    Physical Exam:  89 y.o.  female  Vitals:  Temp:  [98.1 °F (36.7 °C)-98.4 °F (36.9 °C)] 98.2 °F (36.8 °C)  Heart Rate:  [] 110  Resp:  [16] 16  BP: (105-125)/(64-79) 120/79  alert and oriented    Left lower extremity examined dressings are clean dry intact.  Patient seems to be at her baseline regards to function of the left lower extremity she does have sensation distally but is able to wiggle her toes minimally which she states is her baseline has poor ankle dorsiflexion plantarflexion.  Due to this we went ahead and got her a Lenhard boot and she also has an AFO brace for when she is to get up to work with some PT.    Data Review     No results displayed because visit has over 200 results.          No results found.    Medications:  atorvastatin, 20 mg, Oral, Daily  digoxin, 125 mcg, Intravenous, Daily  enoxaparin, 1 mg/kg, Subcutaneous, Q12H  metoprolol tartrate, 5 mg, Intravenous, Q6H  polyethylene glycol, 17 g, Oral, Daily  vitamin B-12, 1,000 mcg, Oral, Daily  vitamin D, 50,000 Units, Oral, Q7 Days      •  acetaminophen  •  acetaminophen  •  docusate sodium  •  HYDROcodone-acetaminophen  •  metoprolol tartrate  •  Morphine  •  ondansetron **OR** ondansetron            Assessment:  Doing well POD  # 4 Days Post-Op Procedure(s) (LRB):  LT. HIP NAIL  (Left)  Problems Addressed this Visit        Musculoskeletal and Injuries    * (Principal) Closed comminuted intertrochanteric fracture of proximal end of left femur ([facility]) - Primary    Relevant Orders    Walker    Ambulatory Referral to Home Health      Other Visit Diagnoses     Atrial fibrillation with RVR ([facility])        Relevant Medications    digoxin (LANOXIN) 125 MCG tablet      Diagnoses       Codes Comments    Closed comminuted intertrochanteric fracture of left femur, initial encounter ([facility])    -  Primary ICD-10-CM: S72.142A  ICD-9-CM: 820.21     Atrial fibrillation with RVR ([facility])     ICD-10-CM: I48.91  ICD-9-CM: 427.31           Plan:  Patient seems to be at her baseline regards to left lower extremity function this is become more clear as we discussed with her more was not just limited due to some pain from her fracture from before surgery.  She states she has had tripped in the past before due to her poor ankle function.  With that we have L'Blaised in place an AFO bedside for her to have when she is up with therapy.  Encouraged her to mobilize with therapy.  We will continue to see how she does with swallowing which has apparently been an issue since even before surgery.  Speech is seeing her.  Continue efforts to mobilize  Continue Pain Control Measures  Continue incisional Care  DVT prophylaxis  We will continue to monitor.      Please call with any questions or concerns thank you    Gene Welsh MD    Date: 9/13/2022  Time: 10:18 EDT

## 2022-09-13 NOTE — PROGRESS NOTES
"    Patient Name: Brandy Han  :1930  89 y.o.      Patient Care Team:  Rupal Sutton MD as PCP - General    Chief Complaint: follow up AF, SSS, hip fracture    Interval History: still not eating or drinking. HR pretty well controlled 90s while I was in the room.        Objective   Vital Signs  Temp:  [98.1 °F (36.7 °C)-98.4 °F (36.9 °C)] 98.2 °F (36.8 °C)  Heart Rate:  [] 110  Resp:  [16] 16  BP: (105-125)/(64-79) 120/79    Intake/Output Summary (Last 24 hours) at 2022 1141  Last data filed at 2022 0507  Gross per 24 hour   Intake 950 ml   Output 400 ml   Net 550 ml     Flowsheet Rows    Flowsheet Row First Filed Value   Admission Height 170.2 cm (67\") Documented at 2022   Admission Weight 77.2 kg (170 lb 3.1 oz) Documented at 2022 1816          Physical Exam:   General Appearance:    Alert, cooperative, in no acute distress   Lungs:     Clear to auscultation.  Normal respiratory effort and rate.      Heart:    irregular rhythm and normal rate, normal S1 and S2, no murmurs, gallops or rubs.     Chest Wall:    No abnormalities observed   Abdomen:     Soft, nontender, positive bowel sounds.     Extremities:   no cyanosis, clubbing or edema.  No marked joint deformities.  Adequate musculoskeletal strength.       Results Review:    Results from last 7 days   Lab Units 22  0528   SODIUM mmol/L 136   POTASSIUM mmol/L 4.2   CHLORIDE mmol/L 101   CO2 mmol/L 23.0   BUN mg/dL 21   CREATININE mg/dL 0.64   GLUCOSE mg/dL 80   CALCIUM mg/dL 8.1*     Results from last 7 days   Lab Units 22  1414   CK TOTAL U/L 581*   TROPONIN T ng/mL <0.010     Results from last 7 days   Lab Units 22  0528   WBC 10*3/mm3 6.87   HEMOGLOBIN g/dL 8.7*   HEMATOCRIT % 26.1*   PLATELETS 10*3/mm3 136*                           Medication Review:   atorvastatin, 20 mg, Oral, Daily  digoxin, 125 mcg, Intravenous, Daily  enoxaparin, 1 mg/kg, Subcutaneous, Q12H  metoprolol tartrate, 5 mg, " Intravenous, Q6H  polyethylene glycol, 17 g, Oral, Daily  vitamin B-12, 1,000 mcg, Oral, Daily  vitamin D, 50,000 Units, Oral, Q7 Days         lactated ringers, 9 mL/hr  lactated ringers, 9 mL/hr, Last Rate: 100 mL/hr (09/09/22 0621)  lactated ringers, 100 mL/hr, Last Rate: 100 mL/hr (09/13/22 1996)        Assessment & Plan   1. Chronic atrial fibrillation - presented with RVR. On IV meds for rate and subQ lovenox.   2. Unwitnessed fall - can not rule out syncope. Possible hypotension. Follow BP here closely. Echocardiogram pretty unremarkable.   3. Carotid artery stenosis  4. Hyperlipidemia   5.  Hip fracture status post surgical repair 9/9/2022  6. Dysphagia - evaluation underway. Remains NPO.    Continue IV medications until her swallowing issues have resolved and we can resume her home medications.    Awaiting repeat speech therapy evaluation.     Will follow chart peripherally to monitor HR and change meds back to oral once swallowing issues have been addressed. Please call if needed.     SHERITA Garcia  [facility]  09/13/22  11:41 EDT

## 2022-09-14 LAB
ALBUMIN SERPL-MCNC: 2.3 G/DL (ref 3.5–5.2)
ALBUMIN/GLOB SERPL: 0.8 G/DL
ALP SERPL-CCNC: 63 U/L (ref 39–117)
ALT SERPL W P-5'-P-CCNC: 10 U/L (ref 1–33)
ANION GAP SERPL CALCULATED.3IONS-SCNC: 11.1 MMOL/L (ref 5–15)
AST SERPL-CCNC: 14 U/L (ref 1–32)
BASOPHILS # BLD AUTO: 0.03 10*3/MM3 (ref 0–0.2)
BASOPHILS NFR BLD AUTO: 0.5 % (ref 0–1.5)
BILIRUB SERPL-MCNC: 1.2 MG/DL (ref 0–1.2)
BUN SERPL-MCNC: 17 MG/DL (ref 8–23)
BUN/CREAT SERPL: 34.7 (ref 7–25)
CALCIUM SPEC-SCNC: 8.2 MG/DL (ref 8.2–9.6)
CHLORIDE SERPL-SCNC: 100 MMOL/L (ref 98–107)
CO2 SERPL-SCNC: 24.9 MMOL/L (ref 22–29)
CREAT SERPL-MCNC: 0.49 MG/DL (ref 0.57–1)
DEPRECATED RDW RBC AUTO: 41.3 FL (ref 37–54)
EGFRCR SERPLBLD CKD-EPI 2021: 88.6 ML/MIN/1.73
EOSINOPHIL # BLD AUTO: 0.16 10*3/MM3 (ref 0–0.4)
EOSINOPHIL NFR BLD AUTO: 2.6 % (ref 0.3–6.2)
ERYTHROCYTE [DISTWIDTH] IN BLOOD BY AUTOMATED COUNT: 12 % (ref 12.3–15.4)
GLOBULIN UR ELPH-MCNC: 3 GM/DL
GLUCOSE SERPL-MCNC: 85 MG/DL (ref 65–99)
HCT VFR BLD AUTO: 27.5 % (ref 34–46.6)
HGB BLD-MCNC: 9 G/DL (ref 12–15.9)
IMM GRANULOCYTES # BLD AUTO: 0.2 10*3/MM3 (ref 0–0.05)
IMM GRANULOCYTES NFR BLD AUTO: 3.3 % (ref 0–0.5)
LYMPHOCYTES # BLD AUTO: 1.71 10*3/MM3 (ref 0.7–3.1)
LYMPHOCYTES NFR BLD AUTO: 28 % (ref 19.6–45.3)
MCH RBC QN AUTO: 31 PG (ref 26.6–33)
MCHC RBC AUTO-ENTMCNC: 32.7 G/DL (ref 31.5–35.7)
MCV RBC AUTO: 94.8 FL (ref 79–97)
MONOCYTES # BLD AUTO: 0.67 10*3/MM3 (ref 0.1–0.9)
MONOCYTES NFR BLD AUTO: 11 % (ref 5–12)
NEUTROPHILS NFR BLD AUTO: 3.34 10*3/MM3 (ref 1.7–7)
NEUTROPHILS NFR BLD AUTO: 54.6 % (ref 42.7–76)
NRBC BLD AUTO-RTO: 0 /100 WBC (ref 0–0.2)
PLATELET # BLD AUTO: 220 10*3/MM3 (ref 140–450)
PMV BLD AUTO: 9.8 FL (ref 6–12)
POTASSIUM SERPL-SCNC: 3.7 MMOL/L (ref 3.5–5.2)
PROT SERPL-MCNC: 5.3 G/DL (ref 6–8.5)
RBC # BLD AUTO: 2.9 10*6/MM3 (ref 3.77–5.28)
SODIUM SERPL-SCNC: 136 MMOL/L (ref 136–145)
WBC NRBC COR # BLD: 6.11 10*3/MM3 (ref 3.4–10.8)

## 2022-09-14 PROCEDURE — 97530 THERAPEUTIC ACTIVITIES: CPT

## 2022-09-14 PROCEDURE — 25010000002 ENOXAPARIN PER 10 MG: Performed by: ORTHOPAEDIC SURGERY

## 2022-09-14 PROCEDURE — 97110 THERAPEUTIC EXERCISES: CPT

## 2022-09-14 PROCEDURE — 99024 POSTOP FOLLOW-UP VISIT: CPT | Performed by: ORTHOPAEDIC SURGERY

## 2022-09-14 PROCEDURE — 99232 SBSQ HOSP IP/OBS MODERATE 35: CPT | Performed by: INTERNAL MEDICINE

## 2022-09-14 PROCEDURE — 80053 COMPREHEN METABOLIC PANEL: CPT | Performed by: INTERNAL MEDICINE

## 2022-09-14 PROCEDURE — 85025 COMPLETE CBC W/AUTO DIFF WBC: CPT | Performed by: INTERNAL MEDICINE

## 2022-09-14 PROCEDURE — 25010000002 MORPHINE PER 10 MG: Performed by: ORTHOPAEDIC SURGERY

## 2022-09-14 PROCEDURE — 25010000002 DIGOXIN PER 500 MCG: Performed by: INTERNAL MEDICINE

## 2022-09-14 RX ADMIN — POLYETHYLENE GLYCOL 3350 17 G: 17 POWDER, FOR SOLUTION ORAL at 09:04

## 2022-09-14 RX ADMIN — ATORVASTATIN CALCIUM 20 MG: 20 TABLET, FILM COATED ORAL at 09:04

## 2022-09-14 RX ADMIN — METOPROLOL TARTRATE 5 MG: 1 INJECTION, SOLUTION INTRAVENOUS at 21:30

## 2022-09-14 RX ADMIN — METOPROLOL TARTRATE 5 MG: 1 INJECTION, SOLUTION INTRAVENOUS at 09:04

## 2022-09-14 RX ADMIN — HYDROCODONE BITARTRATE AND ACETAMINOPHEN 1 TABLET: 5; 325 TABLET ORAL at 21:35

## 2022-09-14 RX ADMIN — MORPHINE SULFATE 2 MG: 2 INJECTION, SOLUTION INTRAMUSCULAR; INTRAVENOUS at 06:42

## 2022-09-14 RX ADMIN — SODIUM CHLORIDE, POTASSIUM CHLORIDE, SODIUM LACTATE AND CALCIUM CHLORIDE 100 ML/HR: 600; 310; 30; 20 INJECTION, SOLUTION INTRAVENOUS at 02:44

## 2022-09-14 RX ADMIN — MORPHINE SULFATE 2 MG: 2 INJECTION, SOLUTION INTRAMUSCULAR; INTRAVENOUS at 23:42

## 2022-09-14 RX ADMIN — ENOXAPARIN SODIUM 80 MG: 100 INJECTION SUBCUTANEOUS at 21:30

## 2022-09-14 RX ADMIN — ENOXAPARIN SODIUM 80 MG: 100 INJECTION SUBCUTANEOUS at 09:03

## 2022-09-14 RX ADMIN — METOPROLOL TARTRATE 5 MG: 1 INJECTION, SOLUTION INTRAVENOUS at 16:45

## 2022-09-14 RX ADMIN — DIGOXIN 125 MCG: 0.25 INJECTION INTRAMUSCULAR; INTRAVENOUS at 12:49

## 2022-09-14 RX ADMIN — MORPHINE SULFATE 2 MG: 2 INJECTION, SOLUTION INTRAMUSCULAR; INTRAVENOUS at 09:18

## 2022-09-14 RX ADMIN — Medication 1000 MCG: at 09:04

## 2022-09-14 RX ADMIN — METOPROLOL TARTRATE 5 MG: 1 INJECTION, SOLUTION INTRAVENOUS at 04:15

## 2022-09-14 NOTE — PROGRESS NOTES
Orthopedic Progress Note      Patient: Brandy Han    YOB: 1930    Medical Record Number: [id number]    Attending Physician: Frankie Olson MD    Date of Admission: 9/7/2022  1:42 PM    Admitting Dx:  Atrial fibrillation with RVR ([facility]) [I48.91]  Closed comminuted intertrochanteric fracture of left femur, initial encounter ([facility]) [S72.142A]    Status Post:   Cephalomedullary fixation of left intertrochanteric femur fracture         Post Operative Day Number: 5    Current Problem List:   Patient Active Problem List   Diagnosis    History of venous thrombosis    Bilateral carotid artery stenosis    Osteoporosis    Hyperlipidemia    Right-sided low back pain with right-sided sciatica    Sinus arrest    Presence of cardiac pacemaker    B12 deficiency    Chronic pain of both knees    Non-traumatic rhabdomyolysis    Closed traumatic nondisplaced fracture of sixth cervical vertebra ([facility])    Closed nondisplaced fracture of left lateral portion of seventh cervical vertebra ([facility])    Vitamin D deficiency    Paroxysmal atrial fibrillation ([facility])    Overactive bladder    Closed comminuted intertrochanteric fracture of proximal end of left femur ([facility])    Osteoporosis with pathological fracture    Chronic diastolic congestive heart failure ([facility])         Past Medical History:   Diagnosis Date    Anxiety     Arterial thrombosis ([facility])     Arthritis     Atrial fibrillation ([facility])     Headache     Heart palpitations     Migraine     Syncope     UTI (urinary tract infection)        Current Medications:  Scheduled Meds:atorvastatin, 20 mg, Oral, Daily  digoxin, 125 mcg, Intravenous, Daily  enoxaparin, 1 mg/kg, Subcutaneous, Q12H  metoprolol tartrate, 5 mg, Intravenous, Q6H  polyethylene glycol, 17 g, Oral, Daily  vitamin B-12, 1,000 mcg, Oral, Daily  vitamin D, 50,000 Units, Oral, Q7 Days      PRN Meds:.  acetaminophen    acetaminophen    docusate sodium    HYDROcodone-acetaminophen    metoprolol tartrate    Morphine     ondansetron **OR** ondansetron    SUBJECTIVE: 89 y.o.  female. Resting quietly.  Does not appear in any acute distress    OBJECTIVE:   Vitals:    09/13/22 1957 09/14/22 0046 09/14/22 0417 09/14/22 0700   BP: 124/66 119/65 125/59 116/66   BP Location: Right arm Right arm  Right arm   Patient Position: Lying Lying  Lying   Pulse: 93 98 94 93   Resp: 16 16  16   Temp: 98.4 °F (36.9 °C) 98.5 °F (36.9 °C)  97.9 °F (36.6 °C)   TempSrc: Oral Oral  Oral   SpO2: 100% 99%  100%   Weight:       Height:         I/O last 3 completed shifts:  In: 1900 [I.V.:1900]  Out: 350 [Urine:350]    Diagnostic Tests:  Lab Results (last 72 hours)       Procedure Component Value Units Date/Time    Comprehensive Metabolic Panel [[id number]]  (Abnormal) Collected: 09/14/22 0618    Specimen: Blood Updated: 09/14/22 0708     Glucose 85 mg/dL      BUN 17 mg/dL      Creatinine 0.49 mg/dL      Sodium 136 mmol/L      Potassium 3.7 mmol/L      Chloride 100 mmol/L      CO2 24.9 mmol/L      Calcium 8.2 mg/dL      Total Protein 5.3 g/dL      Albumin 2.30 g/dL      ALT (SGPT) 10 U/L      AST (SGOT) 14 U/L      Alkaline Phosphatase 63 U/L      Total Bilirubin 1.2 mg/dL      Globulin 3.0 gm/dL      A/G Ratio 0.8 g/dL      BUN/Creatinine Ratio 34.7     Anion Gap 11.1 mmol/L      eGFR 88.6 mL/min/1.73      Comment: National Kidney Foundation and American Society of Nephrology (ASN) Task Force recommended calculation based on the Chronic Kidney Disease Epidemiology Collaboration (CKD-EPI) equation refit without adjustment for race.       Narrative:      GFR Normal >60  Chronic Kidney Disease <60  Kidney Failure <15      CBC & Differential [[id number]]  (Abnormal) Collected: 09/14/22 0618    Specimen: Blood Updated: 09/14/22 0650    Narrative:      The following orders were created for panel order CBC & Differential.  Procedure                               Abnormality         Status                     ---------                               -----------          ------                     CBC Auto Differential[[id number]]        Abnormal            Final result                 Please view results for these tests on the individual orders.    CBC Auto Differential [[id number]]  (Abnormal) Collected: 09/14/22 0618    Specimen: Blood Updated: 09/14/22 0650     WBC 6.11 10*3/mm3      RBC 2.90 10*6/mm3      Hemoglobin 9.0 g/dL      Hematocrit 27.5 %      MCV 94.8 fL      MCH 31.0 pg      MCHC 32.7 g/dL      RDW 12.0 %      RDW-SD 41.3 fl      MPV 9.8 fL      Platelets 220 10*3/mm3      Neutrophil % 54.6 %      Lymphocyte % 28.0 %      Monocyte % 11.0 %      Eosinophil % 2.6 %      Basophil % 0.5 %      Immature Grans % 3.3 %      Neutrophils, Absolute 3.34 10*3/mm3      Lymphocytes, Absolute 1.71 10*3/mm3      Monocytes, Absolute 0.67 10*3/mm3      Eosinophils, Absolute 0.16 10*3/mm3      Basophils, Absolute 0.03 10*3/mm3      Immature Grans, Absolute 0.20 10*3/mm3      nRBC 0.0 /100 WBC     Comprehensive Metabolic Panel [[id number]]  (Abnormal) Collected: 09/12/22 0528    Specimen: Blood Updated: 09/12/22 0711     Glucose 80 mg/dL      BUN 21 mg/dL      Creatinine 0.64 mg/dL      Sodium 136 mmol/L      Potassium 4.2 mmol/L      Comment: Slight hemolysis detected by analyzer. Results may be affected.        Chloride 101 mmol/L      CO2 23.0 mmol/L      Calcium 8.1 mg/dL      Total Protein 5.4 g/dL      Albumin 2.20 g/dL      ALT (SGPT) 6 U/L      AST (SGOT) 20 U/L      Comment: Slight hemolysis detected by analyzer. Results may be affected.        Alkaline Phosphatase 54 U/L      Total Bilirubin 1.0 mg/dL      Globulin 3.2 gm/dL      A/G Ratio 0.7 g/dL      BUN/Creatinine Ratio 32.8     Anion Gap 12.0 mmol/L      eGFR 83.0 mL/min/1.73      Comment: National Kidney Foundation and American Society of Nephrology (ASN) Task Force recommended calculation based on the Chronic Kidney Disease Epidemiology Collaboration (CKD-EPI) equation refit without adjustment for race.       Narrative:       GFR Normal >60  Chronic Kidney Disease <60  Kidney Failure <15      CBC & Differential [[id number]]  (Abnormal) Collected: 09/12/22 0528    Specimen: Blood Updated: 09/12/22 0637    Narrative:      The following orders were created for panel order CBC & Differential.  Procedure                               Abnormality         Status                     ---------                               -----------         ------                     CBC Auto Differential[[id number]]        Abnormal            Final result                 Please view results for these tests on the individual orders.    CBC Auto Differential [[id number]]  (Abnormal) Collected: 09/12/22 0528    Specimen: Blood Updated: 09/12/22 0637     WBC 6.87 10*3/mm3      RBC 2.72 10*6/mm3      Hemoglobin 8.7 g/dL      Hematocrit 26.1 %      MCV 96.0 fL      MCH 32.0 pg      MCHC 33.3 g/dL      RDW 11.7 %      RDW-SD 40.0 fl      MPV 10.5 fL      Platelets 136 10*3/mm3      Neutrophil % 64.8 %      Lymphocyte % 20.2 %      Monocyte % 11.8 %      Eosinophil % 1.3 %      Basophil % 0.3 %      Immature Grans % 1.6 %      Neutrophils, Absolute 4.45 10*3/mm3      Lymphocytes, Absolute 1.39 10*3/mm3      Monocytes, Absolute 0.81 10*3/mm3      Eosinophils, Absolute 0.09 10*3/mm3      Basophils, Absolute 0.02 10*3/mm3      Immature Grans, Absolute 0.11 10*3/mm3      nRBC 0.0 /100 WBC              PHYSICAL EXAM:  Left hip and thigh dressings intact.  Calf soft and nontender.  Still unable to dorsiflex left foot.  L-Nard boot to left foot while in bed and Left. AFO on when OOB.  Slow progress with PT.  Patient now on Pureed thin liquid diet.  Hg 9.0.       ASSESSMENT & PLAN:  SNF bed available tomorrow.  Can d/c anytime from orthopedic standpoint.      Will sign off for now.  Please call if needed        Date: 9/14/2022    Savita Victoria RN      I have reviewed and the above and agree.    Please call with any questions or concerns thank you    Gene Welsh,  MD

## 2022-09-14 NOTE — THERAPY TREATMENT NOTE
Patient Name: Brandy Han  : 1930    MRN: [id number]                              Today's Date: 2022       Admit Date: 2022    Visit Dx:     ICD-10-CM ICD-9-CM   1. Closed comminuted intertrochanteric fracture of left femur, initial encounter ([facility])  S72.142A 820.21   2. Atrial fibrillation with RVR ([facility])  I48.91 427.31     Patient Active Problem List   Diagnosis   • History of venous thrombosis   • Bilateral carotid artery stenosis   • Osteoporosis   • Hyperlipidemia   • Right-sided low back pain with right-sided sciatica   • Sinus arrest   • Presence of cardiac pacemaker   • B12 deficiency   • Chronic pain of both knees   • Non-traumatic rhabdomyolysis   • Closed traumatic nondisplaced fracture of sixth cervical vertebra ([facility])   • Closed nondisplaced fracture of left lateral portion of seventh cervical vertebra ([facility])   • Vitamin D deficiency   • Paroxysmal atrial fibrillation ([facility])   • Overactive bladder   • Closed comminuted intertrochanteric fracture of proximal end of left femur ([facility])   • Osteoporosis with pathological fracture   • Chronic diastolic congestive heart failure ([facility])     Past Medical History:   Diagnosis Date   • Anxiety    • Arterial thrombosis ([facility])    • Arthritis    • Atrial fibrillation ([facility])    • Headache    • Heart palpitations    • Migraine    • Syncope    • UTI (urinary tract infection)      Past Surgical History:   Procedure Laterality Date   • ANKLE SURGERY     • CARDIAC PACEMAKER PLACEMENT     • OTHER SURGICAL HISTORY Left     elbow surgery   • PACEMAKER IMPLANTATION        General Information     Row Name 22 1158          Physical Therapy Time and Intention    Document Type therapy note (daily note)  -     Mode of Treatment physical therapy  -     Row Name 22 1158          General Information    Existing Precautions/Restrictions fall  -     Row Name 22 1158          Cognition    Orientation Status (Cognition) oriented x 3  -           User Key   (r) = Recorded By, (t) = Taken By, (c) = Cosigned By    Initials Name Provider Type    Klarissa Nascimento PTA Physical Therapist Assistant               Mobility     Row Name 09/14/22 1200          Bed Mobility    Bed Mobility supine-sit;sit-supine  -     Supine-Sit [place] (Bed Mobility) minimum assist (75% patient effort)  -     Sit-Supine [place] (Bed Mobility) maximum assist (25% patient effort);2 person assist  -     Assistive Device (Bed Mobility) bed rails;head of bed elevated  -     Row Name 09/14/22 1200          Sit-Stand Transfer    Sit-Stand [place] (Transfers) moderate assist (50% patient effort);maximum assist (25% patient effort);2 person assist  -           User Key  (r) = Recorded By, (t) = Taken By, (c) = Cosigned By    Initials Name Provider Type    Klarissa Nascimento PTA Physical Therapist Assistant               Obj/Interventions     [facility] Name 09/14/22 1203          Motor Skills    Therapeutic Exercise --  seated AP on R LE, then LAQ x5 reps  -           User Key  (r) = Recorded By, (t) = Taken By, (c) = Cosigned By    Initials Name Provider Type    Klarissa Nascimento PTA Physical Therapist Assistant               Goals/Plan    No documentation.                Clinical Impression     Row Name 09/14/22 1203          Pain    Pretreatment Pain Rating 7/10  -SM     Posttreatment Pain Rating 9/10  -SM     Pre/Posttreatment Pain Comment low back, L hip, R knee  -     Pain Intervention(s) Repositioned;Ambulation/increased activity;Rest  -SM     Row Name 09/14/22 1203          Positioning and Restraints    Pre-Treatment Position in bed  -     Post Treatment Position bed  -SM     In Bed supine;call light within reach;encouraged to call for assist;exit alarm on  -           User Key  (r) = Recorded By, (t) = Taken By, (c) = Cosigned By    Initials Name Provider Type    Klarissa Nascimento PTA Physical Therapist Assistant               Outcome Measures      Row Name 09/14/22 1206          How much help from another person do you currently need...    Turning from your back to your side while in flat bed without using bedrails? 2  -SM     Moving from lying on back to sitting on the side of a flat bed without bedrails? 2  -SM     Moving to and from a bed to a chair (including a wheelchair)? 1  -SM     Standing up from a chair using your arms (e.g., wheelchair, bedside chair)? 2  -SM     Climbing 3-5 steps with a railing? 1  -SM     To walk in hospital room? 1  -SM     AM-PAC 6 Clicks Score (PT) 9  -SM     Highest level of mobility 3 --> Sat at edge of bed  -     Row Name 09/14/22 1206          Functional Assessment    Outcome Measure Options AM-PAC 6 Clicks Basic Mobility (PT)  -           User Key  (r) = Recorded By, (t) = Taken By, (c) = Cosigned By    Initials Name Provider Type    Klarissa Nascimento PTA Physical Therapist Assistant                             Physical Therapy Education                 Title: PT OT SLP Therapies (Done)     Topic: Physical Therapy (Done)     Point: Mobility training (Done)     Learning Progress Summary           Patient Acceptance, E,TB,D, VU,NR by  at 9/14/2022 1207    Acceptance, E,TB,D, VU,NR by  at 9/13/2022 1210    Acceptance, E,TB,D, VU,NR by  at 9/12/2022 1519    Acceptance, E,TB,D, VU,NR by  at 9/10/2022 1642                   Point: Home exercise program (Done)     Learning Progress Summary           Patient Acceptance, E,TB,D, VU,NR by  at 9/14/2022 1207    Acceptance, E,TB,D, VU,NR by  at 9/13/2022 1210    Acceptance, E,TB,D, VU,NR by  at 9/12/2022 1519    Acceptance, E,TB,D, VU,NR by  at 9/10/2022 1642                   Point: Body mechanics (Done)     Learning Progress Summary           Patient Acceptance, E,TB,D, VU,NR by  at 9/14/2022 1207    Acceptance, E,TB,D, VU,NR by  at 9/13/2022 1210    Acceptance, E,TB,D, VU,NR by  at 9/12/2022 1519    Acceptance, E,TB,D, VU,NR by  at 9/10/2022 1645                    Point: Precautions (Done)     Learning Progress Summary           Patient Acceptance, E,TB,D, VU,NR by  at 9/14/2022 1207    Acceptance, E,TB,D, VU,NR by  at 9/13/2022 1210    Acceptance, E,TB,D, VU,NR by  at 9/12/2022 1519    Acceptance, E,TB,D, VU,NR by  at 9/10/2022 1642                               User Key     Initials Effective Dates Name Provider Type [facility] 06/16/21 -  Priyanka Garcia, PT Physical Therapist PT     03/07/18 -  Klarissa Gilmore PTA Physical Therapist Assistant PT              PT Recommendation and Plan     Plan of Care Reviewed With: patient  Progress: improving  Outcome Evaluation: Pt tolerated treatment fair this date. Much improved success w/ supine to sit today. Pt required only assist to slide L LE over in bed, then pt was able to sit herself up the rest of the way w/ HOB elevated and use of bed rail and increased time. Required mod-max A x2 to stand twice, successfully clearing bottom all the way off bed, and maintaining standing balance for ~10-15secs. Pt's daughter brought pt's shoes in today, though were sandals w/ a slight heel on it. Asked daughter to bring a pair of tennis shoes in tonight when visiting again.     Time Calculation:    PT Charges     Row Name 09/14/22 1211             Time Calculation    Start Time 0913  -      Stop Time 0948  -      Time Calculation (min) 35 min  -      PT Received On 09/14/22  -      PT - Next Appointment 09/15/22  -            User Key  (r) = Recorded By, (t) = Taken By, (c) = Cosigned By    Initials Name Provider Type     Klarissa Gilmore PTA Physical Therapist Assistant              Therapy Charges for Today     Code Description Service Date Service Provider Modifiers Qty    12534250969 HC PT THER PROC EA 15 MIN 9/13/2022 Klarissa Gilmore PTA GP 1    99508046112 HC PT THERAPEUTIC ACT EA 15 MIN 9/13/2022 Klarissa Gilmore PTA GP 1    36975208182 HC PT THER SUPP EA 15 MIN  9/13/2022 Mando Klarissa Lauren, PTA GP 2    24865714376 HC PT THER PROC EA 15 MIN 9/14/2022 Mando Klarissa Lauren, PTA  1    15762704590 HC PT THERAPEUTIC ACT EA 15 MIN 9/14/2022 GilmoreKlarissa Lauren, PTA  1    83440334060 HC PT THER SUPP EA 15 MIN 9/14/2022 Mando Klarissa Lauren, PTA  2          PT G-Codes  Outcome Measure Options: AM-PAC 6 Clicks Basic Mobility (PT)  AM-PAC 6 Clicks Score (PT): 9    Klarissa Lauren Gilmore, UZIEL  9/14/2022

## 2022-09-14 NOTE — PROGRESS NOTES
Name: Brandy Han ADMIT: 2022   : 1930  PCP: Rupal Sutton MD    MRN: [id number] LOS: 7 days   AGE/SEX: 89 y.o. female  ROOM: [facility]     Subjective   Subjective   Patient seen at bedside.       Objective   Objective   Vital Signs  Temp:  [97.9 °F (36.6 °C)-98.5 °F (36.9 °C)] 97.9 °F (36.6 °C)  Heart Rate:  [] 102  Resp:  [16] 16  BP: (116-125)/(59-66) 116/66  SpO2:  [99 %-100 %] 100 %  on   ;   Device (Oxygen Therapy): room air  Body mass index is 26.59 kg/m².  Physical Exam   General, awake and alert.  Head and ENT, normocephalic and atraumatic.  Lungs, symmetric expansion, equal air entry bilaterally.  Heart, irregularly irregular  Abdomen, soft and nontender.  Extremities, no clubbing or cyanosis.  Neuro, no focal deficits.  Skin: Warm and no rash.  Psych, normal mood and affect.  Musculoskeletal, joint examination is grossly normal.    Results Review     I reviewed the patient's new clinical results.  Results from last 7 days   Lab Units 09/14/22  0618 09/12/22  0528 09/11/22  0713 09/10/22  1001   WBC 10*3/mm3 6.11 6.87 6.65 6.62   HEMOGLOBIN g/dL 9.0* 8.7* 8.5* 8.9*   PLATELETS 10*3/mm3 220 136* 113* 95*     Results from last 7 days   Lab Units 09/14/22  0618 09/12/22  0528 09/11/22  0713 09/10/22  1001   SODIUM mmol/L 136 136 134* 133*   POTASSIUM mmol/L 3.7 4.2 4.0 4.0   CHLORIDE mmol/L 100 101 102 101   CO2 mmol/L 24.9 23.0 24.0 23.0   BUN mg/dL 17 21 19 21   CREATININE mg/dL 0.49* 0.64 0.60 0.71   GLUCOSE mg/dL 85 80 84 93   EGFR mL/min/1.73 88.6 83.0 84.3 79.9     Results from last 7 days   Lab Units 22  0618 22  0528   ALBUMIN g/dL 2.30* 2.20*   BILIRUBIN mg/dL 1.2 1.0   ALK PHOS U/L 63 54   AST (SGOT) U/L 14 20   ALT (SGPT) U/L 10 6     Results from last 7 days   Lab Units 22  0618 22  0528 22  0713 09/10/22  1001   CALCIUM mg/dL 8.2 8.1* 7.8* 7.8*   ALBUMIN g/dL 2.30* 2.20*  --   --        No results found for: HGBA1C, POCGLU    No radiology  results for the last day  Scheduled Medications  atorvastatin, 20 mg, Oral, Daily  digoxin, 125 mcg, Intravenous, Daily  enoxaparin, 1 mg/kg, Subcutaneous, Q12H  metoprolol tartrate, 5 mg, Intravenous, Q6H  polyethylene glycol, 17 g, Oral, Daily  vitamin B-12, 1,000 mcg, Oral, Daily  vitamin D, 50,000 Units, Oral, Q7 Days    Infusions  lactated ringers, 9 mL/hr  lactated ringers, 9 mL/hr, Last Rate: 100 mL/hr (09/09/22 0744)  lactated ringers, 100 mL/hr, Last Rate: 100 mL/hr (09/14/22 0244)    Diet  Diet Pureed; Thin       Assessment/Plan     Active Hospital Problems    Diagnosis  POA   • **Closed comminuted intertrochanteric fracture of proximal end of left femur (HCC) [S72.142A]  Yes   • Osteoporosis with pathological fracture [M80.00XA]  Yes   • Chronic diastolic congestive heart failure (HCC) [I50.32]  Yes   • Vitamin D deficiency [E55.9]  Yes   • Paroxysmal atrial fibrillation (HCC) [I48.0]  Yes   • B12 deficiency [E53.8]  Yes      Resolved Hospital Problems   No resolved problems to display.       89 y.o. female admitted with Closed comminuted intertrochanteric fracture of proximal end of left femur (HCC).    Assessment and plan:  1.  Closed comminuted intertrochanteric fracture of proximal end of left femur, status post cephalomedullary fixation of left hip fracture, status post orthopedics evaluation.  Continue weightbearing as tolerated.  Encourage ambulation with physical therapy.     2.  History of osteoporosis, patient on vitamin D supplementation.     3.  Paroxysmal atrial fibrillation, patient was however seen by cardiology, she is rate controlled, she is on digoxin as well as full dose Lovenox.     4.  Hyperlipidemia, continue statin therapy.     5.  Dysphagia, GI evaluation, follow speech therapy recommendations.     6.  CODE STATUS is full code.  Further plans based on hospital course.  Patient will need rehab placement upon discharge.  I believe she would benefit from palliative care  evaluation.       Frankie Olson MD  [facility]  09/14/22  18:42 EDT

## 2022-09-14 NOTE — PLAN OF CARE
Goal Outcome Evaluation:  Plan of Care Reviewed With: patient        Progress: improving  Outcome Evaluation: Pt tolerated treatment fair this date. Much improved success w/ supine to sit today. Pt required only assist to slide L LE over in bed, then pt was able to sit herself up the rest of the way w/ HOB elevated and use of bed rail and increased time. Required mod-max A x2 to stand twice, successfully clearing bottom all the way off bed, and maintaining standing balance for ~10-15secs. Pt's daughter brought pt's shoes in today, though were sandals w/ a slight heel on it. Asked daughter to bring a pair of tennis shoes in tonight when visiting again.

## 2022-09-14 NOTE — CONSULTS
"Purpose of the visit was to evaluate for: goals of care/advanced care planning and support for patient/family. Spoke with RN as well as patient and family and discussed palliative care, goals of care and resuscitation status.      Assessment:  Patient is palliative care appropriate given hip fracture s/p repair, atrial fibrillation, CHF, dysphagia. Patient had c/o \"feeling bad\" d/t difficulty swallowing. Patient oriented but was forgetful at times. PPS: 40%.     Recommendations/Plan: Continue current plan of care for now. Plan for rehab. She has a meeting scheduled to complete advance directives with her family. Patient states that she is a DNR/DNI. Called and lvm with patient's dgtr to confirm her wishes for DNR.     Other Comments:   Spoke to patient at bedside. Patient voiced understanding of her condition and plan of care. She expressed concern about still having a difficult time with swallowing and not wanting to eat much. Addressed her CODE STATUS and wishes for life prolonging measures including a feeding tube if indicated. She stated that she does not want to be resuscitated or to be kept alive by artificial means. Patient noted that her primary support is her dgtr and she is involved in patient's care. Called and lvm for dgtr to discuss GOC and confirm patient's DNR status. Will await dgtr's return call for any further needs/support for patient and family involving palliative team.   "

## 2022-09-14 NOTE — PROGRESS NOTES
[facility]  Inpatient Progress Note    Reason for Followup:  Dysphagia    Subjective     Interval History:   VFSS yesterday noted.  Remains weak    Current Facility-Administered Medications:   •  acetaminophen (TYLENOL) tablet 325 mg, 325 mg, Oral, Q4H PRN, Gene Welsh MD  •  acetaminophen (TYLENOL) tablet 650 mg, 650 mg, Oral, Q4H PRN, Gene Welsh MD  •  atorvastatin (LIPITOR) tablet 20 mg, 20 mg, Oral, Daily, Gene Welsh MD, 20 mg at 09/08/22 1121  •  digoxin (LANOXIN) injection 125 mcg, 125 mcg, Intravenous, Daily, Perla Mao MD, 125 mcg at 09/13/22 1248  •  docusate sodium (COLACE) capsule 100 mg, 100 mg, Oral, BID PRN, Gene Welsh MD  •  Enoxaparin Sodium (LOVENOX) syringe 80 mg, 1 mg/kg, Subcutaneous, Q12H, Gene Welsh MD, 80 mg at 09/13/22 2103  •  HYDROcodone-acetaminophen (NORCO) 5-325 MG per tablet 1 tablet, 1 tablet, Oral, Q4H PRN, Gene Welsh MD  •  lactated ringers infusion, 9 mL/hr, Intravenous, Continuous, Gene Welsh MD  •  lactated ringers infusion, 9 mL/hr, Intravenous, Continuous, Gene Welsh MD, Last Rate: 100 mL/hr at 09/09/22 0744, Restarted at 09/09/22 0935  •  lactated ringers infusion, 100 mL/hr, Intravenous, Continuous, Gene Welsh MD, Last Rate: 100 mL/hr at 09/14/22 0244, 100 mL/hr at 09/14/22 0244  •  metoprolol tartrate (LOPRESSOR) injection 5 mg, 5 mg, Intravenous, Q5 Min PRN, Gene Welsh MD  •  metoprolol tartrate (LOPRESSOR) injection 5 mg, 5 mg, Intravenous, Q6H, Perla Mao MD, 5 mg at 09/14/22 0415  •  morphine injection 2 mg, 2 mg, Intravenous, Q2H PRN, Gene Welsh MD, 2 mg at 09/14/22 0642  •  ondansetron (ZOFRAN) tablet 4 mg, 4 mg, Oral, Q6H PRN **OR** ondansetron (ZOFRAN) injection 4 mg, 4 mg, Intravenous, Q6H PRN, Gene Welsh MD  •  polyethylene glycol (MIRALAX) packet 17 g, 17 g, Oral, Daily, Gene Welsh MD, 17 g at 09/12/22 0811  •  vitamin B-12  (CYANOCOBALAMIN) tablet 1,000 mcg, 1,000 mcg, Oral, Daily, Gene Welsh MD  •  vitamin D (ERGOCALCIFEROL) capsule 50,000 Units, 50,000 Units, Oral, Q7 Days, Gene Welsh MD  Review of Systems:   Constitutional: fatigue  GI: dysphagia    Objective     Vital Signs  Temp:  [97.9 °F (36.6 °C)-98.6 °F (37 °C)] 97.9 °F (36.6 °C)  Heart Rate:  [] 93  Resp:  [16] 16  BP: (115-125)/(59-79) 116/66  Body mass index is 26.59 kg/m².    Intake/Output Summary (Last 24 hours) at 9/14/2022 0749  Last data filed at 9/14/2022 0244  Gross per 24 hour   Intake 950 ml   Output 350 ml   Net 600 ml     No intake/output data recorded.     Physical Exam:   General: patient awake, alert and cooperative, frail   Abdomen: soft, nontender, nondistended; normal bowel sounds     Results Review:     I reviewed the patient's new clinical results.    Results from last 7 days   Lab Units 09/14/22  0618 09/12/22  0528 09/11/22  0713   WBC 10*3/mm3 6.11 6.87 6.65   HEMOGLOBIN g/dL 9.0* 8.7* 8.5*   HEMATOCRIT % 27.5* 26.1* 25.7*   PLATELETS 10*3/mm3 220 136* 113*     Results from last 7 days   Lab Units 09/14/22  0618 09/12/22  0528 09/11/22  0713 09/08/22  0634 09/07/22  1414   SODIUM mmol/L 136 136 134*   < > 137   POTASSIUM mmol/L 3.7 4.2 4.0   < > 4.1   CHLORIDE mmol/L 100 101 102   < > 102   CO2 mmol/L 24.9 23.0 24.0   < > 23.0   BUN mg/dL 17 21 19   < > 22   CREATININE mg/dL 0.49* 0.64 0.60   < > 0.87   CALCIUM mg/dL 8.2 8.1* 7.8*   < > 8.7   BILIRUBIN mg/dL 1.2 1.0  --   --  1.7*   ALK PHOS U/L 63 54  --   --  69   ALT (SGPT) U/L 10 6  --   --  8   AST (SGOT) U/L 14 20  --   --  24   GLUCOSE mg/dL 85 80 84   < > 110*    < > = values in this interval not displayed.         Lab Results   Lab Value Date/Time    LIPASE 87 (H) 04/01/2018 6557         Speech pathology summary:VFSS completed with radiologist present, Dr. Ayala. Patient presents with moderate oropharyngeal dysphagia  characterized by decreased coordination of swallow  function, prolonged  mastication, decreased hyolaryngeal excursion, and significant  pharyngeal weakness/ decreased pharyngeal constriction. Study was  limited due to poor intake. Patient exhibited no penetration or  aspiration with honey via cup or nectar via spoon/cup/straw. Patient  held nectar thick bolus in oral cavity for extended time. Demonstrated  multiple swallows for single bolus of nectar thick via spoon. No  penetration or aspiration with thin via spoon or cup. Mild-moderate oral  residue partially cleared with consecutive swallows. Reduced  anterior-posterior transit time and pre-spill to the vallecula with  puree. Moderate pharyngeal residue post swallow. Penetrated puree  residue x1; patient sensed and independently performed throat clear.  Required multiple swallow per single bolus of puree. Thin liquid wash  partially successful in clearing residue. ?Patient demonstrated  prolonged mastication with soft consistency; mild oral residue post  swallow. No penetration or aspiration.    Assessment & Plan   Assessment:   1.  Dysphagia - oropharyngeal by description  2.  L femur fracture s/p operative repair  3.  Generalized weakness    Plan:   VFSS reviewed, recommendations per speech pathology.  If not meeting caloric needs could consider coretrak, but not a good candidate for long term enteral feeding (I.e PEG tube).  No plans for more aggressive GI workup at this time.  GI will see again as needed.    I discussed the patient's findings and my recommendations with patient.          Luis Enrique Jama M.D.  [facility]  [address] [place] [address]  Office: [phone]

## 2022-09-14 NOTE — CASE MANAGEMENT/SOCIAL WORK
Continued Stay Note  Psychiatric     Patient Name: Brandy Han  MRN: [id number]  Today's Date: 9/14/2022    Admit Date: 9/7/2022     Discharge Plan     Row Name 09/14/22 1025       Plan    Plan [facility] SNF- bed available on Thursday    Patient/Family in Agreement with Plan yes    Plan Comments Spoke with Alem/Mihaela and she confirms that [facility] will have a bed tomorrow.  Spoke with daughter by telephone and she is agreeable.  Patient updated at bedside.  Transfer packet in CCP office.  Will need stretcher transport @ [place]. Jennifer Diggs RN               Discharge Codes    No documentation.               Expected Discharge Date and Time     Expected Discharge Date Expected Discharge Time    Sep 15, 2022             Jennifer Diggs RN

## 2022-09-14 NOTE — PLAN OF CARE
Goal Outcome Evaluation:            Pt worked with PT this morning.  Pt remains with little appetite and poor oral intake d/t c/o difficulty swallowing.  Purewick remains in place.  PRN pain med given 2x this shift per pt request.  SCDs remain on BLE.  VSS.  All needs met this shift.

## 2022-09-15 VITALS
TEMPERATURE: 97.4 F | WEIGHT: 169.75 LBS | DIASTOLIC BLOOD PRESSURE: 72 MMHG | RESPIRATION RATE: 16 BRPM | OXYGEN SATURATION: 99 % | HEIGHT: 67 IN | HEART RATE: 104 BPM | BODY MASS INDEX: 26.64 KG/M2 | SYSTOLIC BLOOD PRESSURE: 109 MMHG

## 2022-09-15 RX ORDER — METOPROLOL SUCCINATE 50 MG/1
50 TABLET, EXTENDED RELEASE ORAL
Status: DISCONTINUED | OUTPATIENT
Start: 2022-09-15 | End: 2022-09-15

## 2022-09-15 RX ORDER — ACETAMINOPHEN 500 MG
500 TABLET ORAL EVERY 6 HOURS PRN
Qty: 40 TABLET | Refills: 0 | Status: SHIPPED | OUTPATIENT
Start: 2022-09-15

## 2022-09-15 RX ORDER — DIGOXIN 125 MCG
125 TABLET ORAL
Status: DISCONTINUED | OUTPATIENT
Start: 2022-09-15 | End: 2022-09-16 | Stop reason: HOSPADM

## 2022-09-15 RX ADMIN — HYDROCODONE BITARTRATE AND ACETAMINOPHEN 1 TABLET: 5; 325 TABLET ORAL at 20:51

## 2022-09-15 RX ADMIN — POLYETHYLENE GLYCOL 3350 17 G: 17 POWDER, FOR SOLUTION ORAL at 09:39

## 2022-09-15 RX ADMIN — HYDROCODONE BITARTRATE AND ACETAMINOPHEN 1 TABLET: 5; 325 TABLET ORAL at 09:43

## 2022-09-15 RX ADMIN — RIVAROXABAN 20 MG: 20 TABLET, FILM COATED ORAL at 19:00

## 2022-09-15 RX ADMIN — DIGOXIN 125 MCG: 125 TABLET ORAL at 12:39

## 2022-09-15 RX ADMIN — METOPROLOL TARTRATE 5 MG: 1 INJECTION, SOLUTION INTRAVENOUS at 03:17

## 2022-09-15 RX ADMIN — METOPROLOL TARTRATE 25 MG: 25 TABLET, FILM COATED ORAL at 20:51

## 2022-09-15 RX ADMIN — Medication 1000 MCG: at 09:39

## 2022-09-15 RX ADMIN — ATORVASTATIN CALCIUM 20 MG: 20 TABLET, FILM COATED ORAL at 09:39

## 2022-09-15 RX ADMIN — METOPROLOL SUCCINATE 50 MG: 50 TABLET, FILM COATED, EXTENDED RELEASE ORAL at 09:39

## 2022-09-15 NOTE — CASE MANAGEMENT/SOCIAL WORK
Continued Stay Note  [facility]     Patient Name: Brandy Han  MRN: [id number]  Today's Date: 9/15/2022    Admit Date: 9/7/2022     Discharge Plan     Row Name 09/15/22 1326       Plan    Plan [facility] SN[facility] via [facility]    Patient/Family in Agreement with Plan yes    Plan Comments DC orders in [facility].  Message to Alem/Signature.  RN spoke with daughter and she is aware/agreeable.  [facility] scheduled for 9:30 p.m. (only available).  Packet to [facility].  Sybil GLASS                Expected Discharge Date and Time     Expected Discharge Date Expected Discharge Time    Sep 15, 2022             Becky S. [place], RN

## 2022-09-15 NOTE — DISCHARGE SUMMARY
[facility]    Date of Discharge:  9/15/2022    PCP: Rupal Sutton MD    Discharge Diagnosis:   Active Hospital Problems    Diagnosis  POA   • **Closed comminuted intertrochanteric fracture of proximal end of left femur (HCC) [S72.142A]  Yes   • Osteoporosis with pathological fracture [M80.00XA]  Yes   • Chronic diastolic congestive heart failure (HCC) [I50.32]  Yes   • Vitamin D deficiency [E55.9]  Yes   • Paroxysmal atrial fibrillation (HCC) [I48.0]  Yes   • B12 deficiency [E53.8]  Yes      Resolved Hospital Problems   No resolved problems to display.     Procedure(s):  LT. HIP NAIL     Consults     Date and Time Order Name Status Description    9/12/2022 11:39 AM Inpatient Gastroenterology Consult Completed     9/7/2022  6:21 PM Inpatient Cardiology Consult Completed     9/7/2022  3:24 PM Ortho (on-call MD unless specified) Completed         Hospital Course  89-year-old female, with history of osteoporosis, hyperlipidemia, presented to the hospital, has been diagnosed to have Closed comminuted intertrochanteric fracture of proximal end of left femur, status post cephalomedullary fixation of left hip fracture, status post orthopedics evaluation.  Continued weightbearing as tolerated.  Encouraged ambulation with physical therapy.  She also has history of osteoporosis, is on vitamin D supplementation.  Patient does have paroxysmal atrial fibrillation, seen by cardiology, she is on rate control, on digoxin.  She has been transitioned to oral Xarelto.  Since patient is clinically improved, she has been accepted to rehab facility for further management and getting and continuing rehabilitation.  I have seen and examined patient at bedside, total time spent on discharge and management is more than 30 minutes.          Condition on Discharge: Improved.     Temp:  [97.7 °F (36.5 °C)-98 °F (36.7 °C)] 97.9 °F (36.6 °C)  Heart Rate:  [] 89  Resp:  [16-18]  16  BP: ()/(62-73) 112/71  Body mass index is 26.59 kg/m².    Physical Exam   General, awake and alert.  Head and ENT, normocephalic and atraumatic.  Lungs, symmetric expansion, equal air entry bilaterally.  Heart, irregularly irregular  Abdomen, soft and nontender.  Extremities, no clubbing or cyanosis.  Neuro, no focal deficits.  Skin: Warm and no rash.  Psych, normal mood and affect.  Musculoskeletal, joint examination is grossly normal.    Disposition: Rehab Facility or Unit (DC - External)       Discharge Medications      New Medications      Instructions Start Date   acetaminophen 500 MG tablet  Commonly known as: TYLENOL   500 mg, Oral, Every 6 Hours PRN      metoprolol tartrate 25 MG tablet  Commonly known as: LOPRESSOR   25 mg, Oral, Every 12 Hours Scheduled      vitamin D 1.25 MG (97540 UT) capsule capsule  Commonly known as: ERGOCALCIFEROL   50,000 Units, Oral, Every 7 Days   Start Date: September 16, 2022        Changes to Medications      Instructions Start Date   rivaroxaban 20 MG tablet  Commonly known as: XARELTO  What changed:   · medication strength  · how much to take  · when to take this   20 mg, Oral, Daily With Dinner         Continue These Medications      Instructions Start Date   atorvastatin 20 MG tablet  Commonly known as: LIPITOR   20 mg, Oral, Daily      digoxin 125 MCG tablet  Commonly known as: LANOXIN   125 mcg, Oral, Daily Digoxin      spironolactone 25 MG tablet  Commonly known as: ALDACTONE   TAKE 1 TABLET BY MOUTH DAILY         Stop These Medications    metoprolol succinate XL 50 MG 24 hr tablet  Commonly known as: TOPROL-XL           Diet Instructions     Diet: Specialty Diet; Thin Liquids, No Restrictions; Low Sodium      Discharge Diet: Specialty Diet    Fluid Consistency: Thin Liquids, No Restrictions    Specialty Diets: Low Sodium          Additional Instructions for the Follow-ups that You Need to Schedule     Ambulatory Referral to Home Health   As directed      Face  to Face Visit Date: 9/10/2022    Follow-up provider for Plan of Care?: I will be treating the patient on an ongoing basis.  Please send me the Plan of Care for signature.    Follow-up provider: GENE REDDY [[id number]]    Reason/Clinical Findings: Postsurgical    Describe mobility limitations that make leaving home difficult: Requires assistance of another to leave home    Nursing/Therapeutic Services Requested: Physical Therapy    Frequency: 1 Week 1         Discharge Follow-up with PCP   As directed       Currently Documented PCP:    Rupal Sutton MD    PCP Phone Number:    [phone]     Follow Up Details: Follow-up with PCP in 7 days.         Discharge Follow-up with Specialty: Cardiology; 2 Weeks   As directed      Specialty: Cardiology    Follow Up: 2 Weeks         Discharge Follow-up with Specialty: Orthopedics; 2 Weeks   As directed      Specialty: Orthopedics    Follow Up: 2 Weeks    Follow Up Details: Turn to the office to see Dr. Gene Reddy.            Contact information for follow-up providers     Rupal Sutton MD .    Specialty: Internal Medicine  Why: Follow-up with PCP in 7 days.  Contact information:  [address] [address]  [phone]                   Contact information for after-discharge care     Destination     [facility] .    Service: Skilled Nursing  Contact information:  [address]  [facility] [id number] [phone]                               Frankie Olson MD  [facility]  09/15/22    Discharge time spent greater than 30 minutes.

## 2022-09-15 NOTE — PLAN OF CARE
Goal Outcome Evaluation:  Patient is alert and oriented. Treated pain twice and repositioned patient. Patient still has poor appetite but on regular pureed diet crush meds in apple sauce. Extremities edema, SCDs, daily Wgt and I & O monitored. May DC to [facility] today. Continuing plan of care.

## 2022-09-15 NOTE — PLAN OF CARE
Goal Outcome Evaluation:                 Problem: Fall Injury Risk  Goal: Absence of Fall and Fall-Related Injury  9/15/2022 1833 by Tanvi Chaparro RN  Outcome: Adequate for Care Transition  9/15/2022 1750 by Tanvi Chaparro RN  Outcome: Ongoing, Progressing  Intervention: Identify and Manage Contributors  Recent Flowsheet Documentation  Taken 9/15/2022 1830 by Tanvi Chaparro RN  Medication Review/Management: medications reviewed  Taken 9/15/2022 1600 by Tanvi Chaparro RN  Medication Review/Management: medications reviewed  Taken 9/15/2022 1425 by Tanvi Chaparro RN  Medication Review/Management: medications reviewed  Taken 9/15/2022 1227 by Tanvi Chaparro RN  Medication Review/Management: medications reviewed  Taken 9/15/2022 1013 by Tanvi Chaparro RN  Medication Review/Management: medications reviewed  Taken 9/15/2022 0943 by Tanvi Chaparro RN  Medication Review/Management: medications reviewed  Taken 9/15/2022 0800 by Tanvi Chaparro RN  Medication Review/Management: medications reviewed  Intervention: Promote Injury-Free Environment  Recent Flowsheet Documentation  Taken 9/15/2022 1830 by Tanvi Chaparro RN  Safety Promotion/Fall Prevention:   activity supervised   assistive device/personal items within reach   clutter free environment maintained   fall prevention program maintained   nonskid shoes/slippers when out of bed   room organization consistent   safety round/check completed  Taken 9/15/2022 1600 by Tanvi Chaparro RN  Safety Promotion/Fall Prevention:   activity supervised   assistive device/personal items within reach   clutter free environment maintained   fall prevention program maintained   nonskid shoes/slippers when out of bed   room organization consistent   safety round/check completed  Taken 9/15/2022 1425 by Tanvi Chaparro, RN  Safety Promotion/Fall Prevention:   activity supervised   assistive device/personal items within reach   clutter free environment  maintained   fall prevention program maintained   nonskid shoes/slippers when out of bed   room organization consistent   safety round/check completed  Taken 9/15/2022 1227 by Tanvi Chaparro RN  Safety Promotion/Fall Prevention:   activity supervised   assistive device/personal items within reach   clutter free environment maintained   fall prevention program maintained   nonskid shoes/slippers when out of bed   room organization consistent   safety round/check completed  Taken 9/15/2022 1013 by Tanvi Chaparro RN  Safety Promotion/Fall Prevention:   activity supervised   assistive device/personal items within reach   clutter free environment maintained   fall prevention program maintained   nonskid shoes/slippers when out of bed   room organization consistent   safety round/check completed  Taken 9/15/2022 0943 by Tanvi Chaparro RN  Safety Promotion/Fall Prevention:   activity supervised   assistive device/personal items within reach   clutter free environment maintained   fall prevention program maintained   nonskid shoes/slippers when out of bed   room organization consistent   safety round/check completed  Taken 9/15/2022 0800 by Tanvi Chaparro, RN  Safety Promotion/Fall Prevention:   activity supervised   assistive device/personal items within reach   clutter free environment maintained   fall prevention program maintained   nonskid shoes/slippers when out of bed   room organization consistent   safety round/check completed     Problem: Adult Inpatient Plan of Care  Goal: Patient-Specific Goal (Individualized)  9/15/2022 1833 by Tanvi Chaparro, RN  Outcome: Adequate for Care Transition  9/15/2022 1750 by Taniv Chaparro, [facility]  Outcome: Ongoing, Progressing  Goal: Absence of Hospital-Acquired Illness or Injury  9/15/2022 1833 by Tanvi Chaparro, RN  Outcome: Adequate for Care Transition  9/15/2022 1750 by Tanvi Chaparro, QUAN  Outcome: Ongoing, Progressing  Intervention: Identify and Manage Fall  Risk  Recent Flowsheet Documentation  Taken 9/15/2022 1830 by Tanvi Chaparro RN  Safety Promotion/Fall Prevention:   activity supervised   assistive device/personal items within reach   clutter free environment maintained   fall prevention program maintained   nonskid shoes/slippers when out of bed   room organization consistent   safety round/check completed  Taken 9/15/2022 1600 by Tanvi Chaparro RN  Safety Promotion/Fall Prevention:   activity supervised   assistive device/personal items within reach   clutter free environment maintained   fall prevention program maintained   nonskid shoes/slippers when out of bed   room organization consistent   safety round/check completed  Taken 9/15/2022 1425 by Tanvi Chaparro RN  Safety Promotion/Fall Prevention:   activity supervised   assistive device/personal items within reach   clutter free environment maintained   fall prevention program maintained   nonskid shoes/slippers when out of bed   room organization consistent   safety round/check completed  Taken 9/15/2022 1227 by Tanvi Chaparro RN  Safety Promotion/Fall Prevention:   activity supervised   assistive device/personal items within reach   clutter free environment maintained   fall prevention program maintained   nonskid shoes/slippers when out of bed   room organization consistent   safety round/check completed  Taken 9/15/2022 1013 by Tanvi Chaparro RN  Safety Promotion/Fall Prevention:   activity supervised   assistive device/personal items within reach   clutter free environment maintained   fall prevention program maintained   nonskid shoes/slippers when out of bed   room organization consistent   safety round/check completed  Taken 9/15/2022 0943 by Tanvi Chaparro RN  Safety Promotion/Fall Prevention:   activity supervised   assistive device/personal items within reach   clutter free environment maintained   fall prevention program maintained   nonskid shoes/slippers when out of bed   room  organization consistent   safety round/check completed  Taken 9/15/2022 0800 by Tanvi Chaparro RN  Safety Promotion/Fall Prevention:   activity supervised   assistive device/personal items within reach   clutter free environment maintained   fall prevention program maintained   nonskid shoes/slippers when out of bed   room organization consistent   safety round/check completed  Intervention: Prevent Skin Injury  Recent Flowsheet Documentation  Taken 9/15/2022 0943 by Tanvi Chaparro RN  Skin Protection:   adhesive use limited   incontinence pads utilized   tubing/devices free from skin contact  Intervention: Prevent and Manage VTE (Venous Thromboembolism) Risk  Recent Flowsheet Documentation  Taken 9/15/2022 1800 by Tanvi Chaparro RN  Activity Management: patient refuses activity  Taken 9/15/2022 1425 by Tanvi Chaparro RN  VTE Prevention/Management:   bilateral   sequential compression devices on  Taken 9/15/2022 0943 by Tanvi Chaparro RN  VTE Prevention/Management:   bilateral   sequential compression devices on  Taken 9/15/2022 0800 by Tanvi Chaparro RN  Activity Management: patient refuses activity  Intervention: Prevent Infection  Recent Flowsheet Documentation  Taken 9/15/2022 1830 by Tanvi Chaparro RN  Infection Prevention:   environmental surveillance performed   hand hygiene promoted  Taken 9/15/2022 1600 by Tanvi Chaparro RN  Infection Prevention:   environmental surveillance performed   hand hygiene promoted  Taken 9/15/2022 1425 by Tanvi Chaparro RN  Infection Prevention:   environmental surveillance performed   hand hygiene promoted  Taken 9/15/2022 1227 by Tanvi Chaparro RN  Infection Prevention:   environmental surveillance performed   hand hygiene promoted  Taken 9/15/2022 1013 by Tanvi Chaparro RN  Infection Prevention:   environmental surveillance performed   hand hygiene promoted  Taken 9/15/2022 0943 by Tanvi Chaparro RN  Infection Prevention:   environmental  surveillance performed   hand hygiene promoted  Taken 9/15/2022 0800 by Tanvi Chaparro RN  Infection Prevention:   environmental surveillance performed   hand hygiene promoted  Goal: Optimal Comfort and Wellbeing  9/15/2022 1833 by Tanvi Chaparro RN  Outcome: Adequate for Care Transition  9/15/2022 1750 by Tanvi Chaparro RN  Outcome: Ongoing, Progressing  Intervention: Monitor Pain and Promote Comfort  Recent Flowsheet Documentation  Taken 9/15/2022 0943 by Tanvi Chaparro RN  Pain Management Interventions: see MAR  Intervention: Provide Person-Centered Care  Recent Flowsheet Documentation  Taken 9/15/2022 1425 by Tanvi Chaparro RN  Trust Relationship/Rapport:   care explained   choices provided   questions answered   questions encouraged  Taken 9/15/2022 0943 by Tanvi Chaparro RN  Trust Relationship/Rapport:   care explained   choices provided   questions answered   questions encouraged  Goal: Readiness for Transition of Care  9/15/2022 1833 by Tanvi Chaparro RN  Outcome: Adequate for Care Transition  9/15/2022 1750 by Tanvi Chaparro RN  Outcome: Ongoing, Progressing  Goal: Plan of Care Review  9/15/2022 1833 by Tanvi Chaparro RN  Outcome: Adequate for Care Transition  9/15/2022 1750 by Tanvi Chaparro RN  Outcome: Ongoing, Progressing     Problem: Skin Injury Risk Increased  Goal: Skin Health and Integrity  9/15/2022 1833 by Tanvi Chaparro RN  Outcome: Adequate for Care Transition  9/15/2022 1750 by Tanvi Chaparro RN  Outcome: Ongoing, Progressing  Intervention: Optimize Skin Protection  Recent Flowsheet Documentation  Taken 9/15/2022 0943 by Tanvi Chaparro RN  Pressure Reduction Techniques:   frequent weight shift encouraged   positioned off wounds   weight shift assistance provided  Pressure Reduction Devices:   alternating pressure pump (ADD)   pressure-redistributing mattress utilized  Skin Protection:   adhesive use limited   incontinence pads utilized   tubing/devices  free from skin contact

## 2022-09-15 NOTE — PROGRESS NOTES
Chart reviewed. HR and BP well controlled. She is now taking oral medications crushed. Will transition to rivaroxaban/metoprolol tartrate/digoxin.     Will see as needed.     Please call with questions.     Thanks, Monet

## 2022-09-15 NOTE — PLAN OF CARE
Goal Outcome Evaluation:               Pt encouraged to eat meals and drink supplement drink.  Pt meds switched to oral per MD, pt tolerating well.  Pt to be discharged to [facility] rehab, transportation scheduled at 2130.  Report called in to Mehnaz.  Pt's VSS.  All needs met this shift.

## 2022-09-16 NOTE — CASE MANAGEMENT/SOCIAL WORK
Case Management Discharge Note      Final Note: Marylu glover'd to skilled bed at [facility] via [facility]. Jennifer Diggs RN         Selected Continued Care - Discharged on 9/15/2022 Admission date: 9/7/2022 - Discharge disposition: Rehab Facility or Unit (DC - External)    Destination Coordination complete.    Service Provider Selected Services Address Phone Fax Patient Preferred    [facility]  Skilled Nursing [address] [place], [place] [id number] [phone] --          Durable Medical Equipment    No services have been selected for the patient.              Dialysis/Infusion    No services have been selected for the patient.              Home Medical Care    No services have been selected for the patient.              Therapy    No services have been selected for the patient.              Community Resources    No services have been selected for the patient.              Community & DME    No services have been selected for the patient.                  Transportation Services  Ambulance: [facility]    Final Discharge Disposition Code: 03 - skilled nursing facility (SNF)

## 2022-09-23 ENCOUNTER — TELEPHONE (OUTPATIENT)
Dept: ORTHOPEDIC SURGERY | Facility: CLINIC | Age: 87
End: 2022-09-23

## 2022-09-23 ENCOUNTER — OFFICE VISIT (OUTPATIENT)
Dept: ORTHOPEDIC SURGERY | Facility: CLINIC | Age: 87
End: 2022-09-23

## 2022-09-23 ENCOUNTER — HOSPITAL ENCOUNTER (OUTPATIENT)
Dept: CARDIOLOGY | Facility: HOSPITAL | Age: 87
Discharge: HOME | End: 2022-09-23
Admitting: ORTHOPAEDIC SURGERY

## 2022-09-23 VITALS — BODY MASS INDEX: 25.11 KG/M2 | WEIGHT: 160 LBS | HEIGHT: 67 IN

## 2022-09-23 DIAGNOSIS — M79.605 PAIN AND SWELLING OF LOWER EXTREMITY, LEFT: ICD-10-CM

## 2022-09-23 DIAGNOSIS — S72.142D CLOSED COMMINUTED INTERTROCHANTERIC FRACTURE OF LEFT FEMUR WITH ROUTINE HEALING, SUBSEQUENT ENCOUNTER: ICD-10-CM

## 2022-09-23 DIAGNOSIS — M79.89 PAIN AND SWELLING OF LOWER EXTREMITY, LEFT: ICD-10-CM

## 2022-09-23 LAB
BH CV LOWER VASCULAR LEFT COMMON FEMORAL AUGMENT: NORMAL
BH CV LOWER VASCULAR LEFT COMMON FEMORAL COMPETENT: NORMAL
BH CV LOWER VASCULAR LEFT COMMON FEMORAL COMPRESS: NORMAL
BH CV LOWER VASCULAR LEFT COMMON FEMORAL PHASIC: NORMAL
BH CV LOWER VASCULAR LEFT COMMON FEMORAL SPONT: NORMAL
BH CV LOWER VASCULAR LEFT DISTAL FEMORAL COMPRESS: NORMAL
BH CV LOWER VASCULAR LEFT GASTRONEMIUS COMPRESS: NORMAL
BH CV LOWER VASCULAR LEFT GREATER SAPH AK COMPRESS: NORMAL
BH CV LOWER VASCULAR LEFT GREATER SAPH BK COMPRESS: NORMAL
BH CV LOWER VASCULAR LEFT MID FEMORAL AUGMENT: NORMAL
BH CV LOWER VASCULAR LEFT MID FEMORAL COMPETENT: NORMAL
BH CV LOWER VASCULAR LEFT MID FEMORAL COMPRESS: NORMAL
BH CV LOWER VASCULAR LEFT MID FEMORAL PHASIC: NORMAL
BH CV LOWER VASCULAR LEFT MID FEMORAL SPONT: NORMAL
BH CV LOWER VASCULAR LEFT PERONEAL COMPRESS: NORMAL
BH CV LOWER VASCULAR LEFT POPLITEAL AUGMENT: NORMAL
BH CV LOWER VASCULAR LEFT POPLITEAL COMPETENT: NORMAL
BH CV LOWER VASCULAR LEFT POPLITEAL COMPRESS: NORMAL
BH CV LOWER VASCULAR LEFT POPLITEAL PHASIC: NORMAL
BH CV LOWER VASCULAR LEFT POPLITEAL SPONT: NORMAL
BH CV LOWER VASCULAR LEFT POSTERIOR TIBIAL COMPRESS: NORMAL
BH CV LOWER VASCULAR LEFT PROFUNDA FEMORAL AUGMENT: NORMAL
BH CV LOWER VASCULAR LEFT PROXIMAL FEMORAL COMPRESS: NORMAL
BH CV LOWER VASCULAR LEFT SAPHENOFEMORAL JUNCTION COMPRESS: NORMAL
BH CV LOWER VASCULAR RIGHT COMMON FEMORAL AUGMENT: NORMAL
BH CV LOWER VASCULAR RIGHT COMMON FEMORAL COMPETENT: NORMAL
BH CV LOWER VASCULAR RIGHT COMMON FEMORAL COMPRESS: NORMAL
BH CV LOWER VASCULAR RIGHT COMMON FEMORAL PHASIC: NORMAL
BH CV LOWER VASCULAR RIGHT COMMON FEMORAL SPONT: NORMAL
MAXIMAL PREDICTED HEART RATE: 128 BPM
STRESS TARGET HR: 109 BPM

## 2022-09-23 PROCEDURE — 73502 X-RAY EXAM HIP UNI 2-3 VIEWS: CPT | Performed by: ORTHOPAEDIC SURGERY

## 2022-09-23 PROCEDURE — 99024 POSTOP FOLLOW-UP VISIT: CPT | Performed by: ORTHOPAEDIC SURGERY

## 2022-09-23 PROCEDURE — 93971 EXTREMITY STUDY: CPT

## 2022-09-23 NOTE — PROGRESS NOTES
Brandy Han     : 1930     MRN: [id number]     DATE: 2022    CC:  2 weeks status post cephalomedullary fixation of femur fracture    There were no vitals filed for this visit.    HPI: Patient returns to clinic today, now 2 weeks out from surgery.  Patient is here with her daughter.  States she continues to struggle with her dropfoot on the left she is in her Lennar boot.  They also put a boot on the other side.  She states that he just put that on today for her to come here.  She is doing some therapy but frequency varies.  She has been up some but has not been walking much.  States she still had some numbness and tingling in her foot which is consistent with her baseline.  States still having some pain in the belly been having on Tylenol.  She is still on Xarelto.  Patient has been have some left lower extremity swelling.  Denies any calf pain.    Physical exam: Incisions are well-approximated.  No erythema or drainage.  Motion is limited.  Patient had Lennar boot in place still struggles with dorsiflexion minimal plantarflexion of the left ankle.  Can wiggle her toes some sensation intact distally.  She has some generalized swelling of the left lower extremity 2+ pitting edema no overt calf tenderness or pain negative Homans.  Right lower extremity has some Viviane boot on the strap I do her about her skin and we will get that addressed and apply some padding..  Good pedal pulses with brisk cap refill.    Imaging  2 views of the left hip and knee (including AP and lateral) are ordered and reviewed for comparison purposes and to evaluate healing.  X-rays show alignment unchanged.     Impression:  2 weeks s/p cephalomedullary fixation of left femur fracture    Plan:    1.  Continue PT: Weightbearing as tolerated to left lower extremity with walker and Lennar boot in place.  2.  Continue DVT prophylaxis patient is currently on Xarelto as a home medication continue.    Given the patient's lower  extremity swelling I will send for a stat Doppler.    Staples removed Steri-Strips placed may be removed in 7 to 10 days if not have not fallen off.    Recommend removing Ed boots 3-5 times a day to check skin.  Also apply padding to right Viviane boot.    Continue calcium and vitamin D for bone healing health.    For orthopedic standpoint additional pain medication is reasonable if deemed safe and needed for the patient.     Follow up in 4 weeks with repeat 2v x-rays of hip and knee.    Please call with any questions or concerns thank you     [phone]    Gene Welsh MD

## 2022-09-23 NOTE — TELEPHONE ENCOUNTER
CALLED PATIENT DAUGHTER TALK TO HER SHE HAS CALLED NURSING HOME 3 TIMES CANT GET AHOLD OF THEM I ADVISED HER TO CALL THE NUMBER ON TRANSPORTATION AND LET THEM KNOW WHERE SHE IS.

## 2022-09-23 NOTE — TELEPHONE ENCOUNTER
Caller: SHAWNA    Relationship to patient: [facility]    Best call back number: [phone]    Patient is needing: SHAWNA WITH [facility] WAS CALLING TO DISCUSS PATIENT'S APPT WITH DR. REDDY THIS MORNING. PATIENT WENT TO HER APPT TODAY AT 10:20 AM BUT TRANSPORTATION IS NOT ABLE TO REACH HER AND NOT SURE WHEN SHE NEEDS TO BE PICKED UP. I ATTEMPTED TO WARM TRANSFER CALL TO THE OFFICE BUT RECEIVED NO ANSWER. THANK YOU!

## 2022-09-23 NOTE — PROGRESS NOTES
Patient sent for a left lower extremity venous duplex stat. Scan completed and preliminary report of negative for DVT in left leg called to Dr. Gene Welsh. Patient was free to leave.

## 2022-10-06 ENCOUNTER — OFFICE VISIT (OUTPATIENT)
Dept: ORTHOPEDIC SURGERY | Facility: CLINIC | Age: 87
End: 2022-10-06

## 2022-10-06 VITALS — WEIGHT: 158 LBS | TEMPERATURE: 97.8 F | BODY MASS INDEX: 24.8 KG/M2 | HEIGHT: 67 IN

## 2022-10-06 DIAGNOSIS — S72.142D CLOSED COMMINUTED INTERTROCHANTERIC FRACTURE OF LEFT FEMUR WITH ROUTINE HEALING, SUBSEQUENT ENCOUNTER: Primary | ICD-10-CM

## 2022-10-06 DIAGNOSIS — R52 PAIN: ICD-10-CM

## 2022-10-06 DIAGNOSIS — M21.372 FOOT DROP, LEFT FOOT: ICD-10-CM

## 2022-10-06 PROCEDURE — 99024 POSTOP FOLLOW-UP VISIT: CPT | Performed by: ORTHOPAEDIC SURGERY

## 2022-10-06 PROCEDURE — 73502 X-RAY EXAM HIP UNI 2-3 VIEWS: CPT | Performed by: ORTHOPAEDIC SURGERY

## 2022-10-06 NOTE — PROGRESS NOTES
Patient: Brandy Han  YOB: 1930 89 y.o. female  Medical Record Number: [id number]    Chief Complaint:   Chief Complaint   Patient presents with   • Left Hip - Follow-up       History of Present Illness:Brandy Han is a 89 y.o. female who presents for follow-up of 4 weeks status post cephalomedullary rodding for left hip fracture.  Patient returned today as she is having some foot and ankle pain and discomfort.  She is here with her daughter.  She states that she did have some pre-existing neuropathy in the left lower extremity but that has been a little bit more noticeable since the injury and surgery as well as previous dropfoot.  She states that at the rehab facility she has used a walker with assistance and has ambulated some.  She had been given L'Nard boot but has not been wearing that much its been here there.  She has no walking boot to help with ambulation at this time.  Denies any hip pain.    Allergies: No Known Allergies    Medications:   Current Outpatient Medications   Medication Sig Dispense Refill   • acetaminophen (TYLENOL) 500 MG tablet Take 1 tablet by mouth Every 6 (Six) Hours As Needed for Moderate Pain. 40 tablet 0   • atorvastatin (LIPITOR) 20 MG tablet TAKE 1 TABLET BY MOUTH DAILY 90 tablet 1   • BISACODYL CO by Combination route.     • digoxin (LANOXIN) 125 MCG tablet Take 125 mcg by mouth Daily.     • metoprolol tartrate (LOPRESSOR) 25 MG tablet Take 1 tablet by mouth Every 12 (Twelve) Hours. 60 tablet 0   • mupirocin (BACTROBAN) 2 % ointment Apply 1 application topically to the appropriate area as directed 3 (Three) Times a Day.     • rivaroxaban (XARELTO) 20 MG tablet Take 1 tablet by mouth Daily With Dinner. Indications: Atrial Fibrillation 30 tablet 0   • spironolactone (ALDACTONE) 25 MG tablet TAKE 1 TABLET BY MOUTH DAILY 90 tablet 3   • vitamin D (ERGOCALCIFEROL) 1.25 MG (98713 UT) capsule capsule Take 1 capsule by mouth Every 7 (Seven) Days. 5 capsule      No current  "facility-administered medications for this visit.         The following portions of the patient's history were reviewed and updated as appropriate: allergies, current medications, past family history, past medical history, past social history, past surgical history and problem list.    Review of Systems:   A 14 point review of systems was performed. All systems negative except pertinent positives/negative listed in HPI above    Physical Exam:   Vitals:    10/06/22 1328   Temp: 97.8 °F (36.6 °C)   Weight: 71.7 kg (158 lb)   Height: 170.2 cm (67.01\")       General: A and O x 3, ASA, NAD    SCLERA:    Normal    DENTITION:   Normal  Left lower extremity examined hip range of motion is well-tolerated.  Baseline foot and ankle motion she has a dropfoot noted previously.  She is able to wiggle her toes.  She does have some weak limited dorsiflexion as well as plantarflexion.  At rest she seems to be comfortable but when we start ranging her ankle she has some discomfort in the lower leg.  Sensation is slightly decreased on the left versus the right.  She does have soft compartments swelling seems to be improved from previous exam.  2+ pedal pulses      Radiology:   AP and lateral films of the left hip were taken reviewed imaging shows status post left medullary rodding for left hip fracture alignment is maintained compared to previous films.  Apparent interval healing with callus formation.  No other significant changes noted.    Negative venous Doppler.    Assessment/Plan:  4-week status post left cephalo-medullary rodding for hip fracture, history of dropfoot and neuropathy    Discussing the findings with the patient and her daughter hip is doing well and it seems that we are progressing some with physical therapy at this time.  In regards to the discomfort she has in her foot and ankle seems to be positional and more soft tissue related as she remains in more of a equinus type position due to baseline dropfoot.  We " discussed things to help with this including doing some ankle range of motion with therapy continue to work on ambulation and also using the L'nard boot when in bed or not up.  Would like for the rehab facility to get her an AFO for ambulation.  Again I think this will help with her therapy mobility.  Also continue calcium and vitamin D for bone healing and health.  Continue anticoagulation therapy.  We will plan to have her follow-up in 6 weeks.  If there are any changes during the interim they are instructed to let me know or return sooner.      All questions were answered.  Patient and her family understand agree with the plan.    Gene Welsh MD  10/6/2022

## 2022-10-06 NOTE — PATIENT INSTRUCTIONS
Assessment/Plan:  4-week status post left cephalo-medullary rodding for hip fracture, history of dropfoot and neuropathy    Discussing the findings with the patient and her daughter hip is doing well and it seems that we are progressing some with physical therapy at this time.  In regards to the discomfort she has in her foot and ankle seems to be positional and more soft tissue related as she remains in more of a equinus type position due to baseline dropfoot.  We discussed things to help with this including doing some ankle range of motion with therapy continue to work on ambulation and also using the L'nard boot when in bed or not up.  Would like for the rehab facility to get her an AFO for ambulation.  Again I think this will help with her therapy mobility.  Also continue calcium and vitamin D for bone healing and health.  Continue anticoagulation therapy.  We will plan to have her follow-up in 6 weeks.  If there are any changes during the interim they are instructed to let me know or return sooner.  All questions were answered.  Patient and her family understand agree with the plan.

## 2022-10-07 ENCOUNTER — TELEPHONE (OUTPATIENT)
Dept: INTERNAL MEDICINE | Facility: CLINIC | Age: 87
End: 2022-10-07

## 2022-10-07 NOTE — TELEPHONE ENCOUNTER
Caller: SANTA ZIMMER RN WITH [facility]    Relationship: [facility]     Best call back number: [phone]    What orders are you requesting (i.e. lab or imaging): PHYSICAL THERAPY AND OCCUPATIONAL THERAPY EVALUATION VERBAL ORDERS    In what timeframe would the patient need to come in: ASAP    Where will you receive your lab/imaging services: IN HOME    Additional notes: SANTA ZIMMER RN WITH [facility] STATED [facility]- NEED ORDER FOR PT/OT EVALUATION, PLEASE CALL BACK WITH ORDERS ASAP

## 2022-10-08 ENCOUNTER — READMISSION MANAGEMENT (OUTPATIENT)
Dept: CALL CENTER | Facility: HOSPITAL | Age: 87
End: 2022-10-08

## 2022-10-09 NOTE — OUTREACH NOTE
Prep Survey    Flowsheet Row Responses   [facility] facility patient discharged from? Non-BH   Is LACE score < 7 ? Non-BH Discharge   Emergency Room discharge w/ pulse ox? No   Eligibility [facility] -    Date of Discharge 10/08/22   Discharge Disposition Home or Self Care   Discharge diagnosis Unknown   Does the patient have one of the following disease processes/diagnoses(primary or secondary)? Other   Prep survey completed? Yes          JERILYN ARGUELLES - Registered Nurse

## 2022-10-10 ENCOUNTER — TRANSITIONAL CARE MANAGEMENT TELEPHONE ENCOUNTER (OUTPATIENT)
Dept: CALL CENTER | Facility: HOSPITAL | Age: 87
End: 2022-10-10

## 2022-10-10 NOTE — OUTREACH NOTE
Call Center TCM Note    Flowsheet Row Responses   [facility] patient discharged from? Non-BH   Does the patient have one of the following disease processes/diagnoses(primary or secondary)? Other   TCM attempt successful? No   Unsuccessful attempts Attempt 1          Melissa Irwin MA    10/10/2022, 15:48 EDT       N/A

## 2022-10-10 NOTE — OUTREACH NOTE
Call Center TCM Note    Flowsheet Row Responses   [facility] patient discharged from? Non-BH   Does the patient have one of the following disease processes/diagnoses(primary or secondary)? Other   TCM attempt successful? No   Unsuccessful attempts Attempt 2  [Call goes to Lisa SHERMAN]          Jayshree Coelho RN    10/10/2022, 16:15 EDT

## 2022-10-11 ENCOUNTER — TRANSITIONAL CARE MANAGEMENT TELEPHONE ENCOUNTER (OUTPATIENT)
Dept: CALL CENTER | Facility: HOSPITAL | Age: 87
End: 2022-10-11

## 2022-10-11 NOTE — OUTREACH NOTE
Call Center TCM Note    Flowsheet Row Responses   [facility] patient discharged from? Non-BH   Does the patient have one of the following disease processes/diagnoses(primary or secondary)? Other   TCM attempt successful? No   Unsuccessful attempts Attempt 3   Wrap up additional comments D/C DX: Left hip fx,  IM Nailing ** Unable to reach pt x 3 attempts for TCM call. Pt is sched with PCP Dr Sutton for TCM APPT on 10/14/2022.          Melissa Irwin MA    10/11/2022, 13:22 EDT

## 2022-10-14 ENCOUNTER — OFFICE VISIT (OUTPATIENT)
Dept: INTERNAL MEDICINE | Facility: CLINIC | Age: 87
End: 2022-10-14

## 2022-10-14 VITALS
SYSTOLIC BLOOD PRESSURE: 106 MMHG | HEIGHT: 67 IN | HEART RATE: 90 BPM | DIASTOLIC BLOOD PRESSURE: 64 MMHG | WEIGHT: 158 LBS | OXYGEN SATURATION: 100 % | BODY MASS INDEX: 24.8 KG/M2

## 2022-10-14 DIAGNOSIS — D64.9 ANEMIA, UNSPECIFIED TYPE: ICD-10-CM

## 2022-10-14 DIAGNOSIS — S72.142D CLOSED COMMINUTED INTERTROCHANTERIC FRACTURE OF LEFT FEMUR WITH ROUTINE HEALING, SUBSEQUENT ENCOUNTER: Primary | ICD-10-CM

## 2022-10-14 DIAGNOSIS — M81.0 OSTEOPOROSIS, UNSPECIFIED OSTEOPOROSIS TYPE, UNSPECIFIED PATHOLOGICAL FRACTURE PRESENCE: ICD-10-CM

## 2022-10-14 DIAGNOSIS — F32.9 CURRENT EPISODE OF MAJOR DEPRESSIVE DISORDER WITHOUT PRIOR EPISODE, UNSPECIFIED DEPRESSION EPISODE SEVERITY: ICD-10-CM

## 2022-10-14 PROCEDURE — 99214 OFFICE O/P EST MOD 30 MIN: CPT | Performed by: INTERNAL MEDICINE

## 2022-10-14 RX ORDER — HYDROCODONE BITARTRATE AND ACETAMINOPHEN 5; 325 MG/1; MG/1
1 TABLET ORAL EVERY 6 HOURS PRN
COMMUNITY

## 2022-10-14 RX ORDER — ESCITALOPRAM OXALATE 10 MG/1
10 TABLET ORAL DAILY
Qty: 90 TABLET | Refills: 3 | Status: SHIPPED | OUTPATIENT
Start: 2022-10-14

## 2022-10-14 NOTE — PROGRESS NOTES
Subjective     Brandy Han is a 89 y.o. female who presents with   Chief Complaint   Patient presents with   • Hospital Follow Up Visit       History of Present Illness     The following data was reviewed by: Rupal Sutton MD on 10/14/2022:  Common labs    Common Labs 9/11/22 9/11/22 9/12/22 9/12/22 9/14/22 9/14/22    0713 0713 0528 0528 0618 0618   Glucose 84   80  85   BUN 19   21  17   Creatinine 0.60   0.64  0.49 (A)   Sodium 134 (A)   136  136   Potassium 4.0   4.2  3.7   Chloride 102   101  100   Calcium 7.8 (A)   8.1 (A)  8.2   Albumin    2.20 (A)  2.30 (A)   Total Bilirubin    1.0  1.2   Alkaline Phosphatase    54  63   AST (SGOT)    20  14   ALT (SGPT)    6  10   WBC  6.65 6.87  6.11    Hemoglobin  8.5 (A) 8.7 (A)  9.0 (A)    Hematocrit  25.7 (A) 26.1 (A)  27.5 (A)    Platelets  113 (A) 136 (A)  220    (A) Abnormal value       Comments are available for some flowsheets but are not being displayed.           Patient presents in hospital f/u.  She was admitted for left femur fracture.  I have reviewed discharge summary, labs, scans, cardiovascular studies and medication changes.  She underwent fixation.  Post op she went to [facility] for rehab.  She developed anemia post-op.  She getting home she is undergoing physical therapy.  Her caregiver is concerned about depression.      OP.  She has been on bisphosphonates in the past.  We discussed Prolia today.         Review of Systems   Respiratory: Negative.    Cardiovascular: Negative.        The following portions of the patient's history were reviewed and updated as appropriate: allergies, current medications and problem list.    Patient Active Problem List    Diagnosis Date Noted   • Closed comminuted intertrochanteric fracture of proximal end of left femur (HCC) 09/07/2022   • Osteoporosis with pathological fracture 09/07/2022   • Chronic diastolic congestive heart failure (HCC) 09/07/2022   • Vitamin D deficiency 05/04/2021   • Overactive  bladder 05/04/2021   • Paroxysmal atrial fibrillation (HCC)    • Closed nondisplaced fracture of left lateral portion of seventh cervical vertebra (HCC) 08/23/2020   • Non-traumatic rhabdomyolysis 08/21/2020   • Closed traumatic nondisplaced fracture of sixth cervical vertebra (HCC) 08/21/2020   • Chronic pain of both knees 04/25/2017   • B12 deficiency 11/04/2016   • Sinus arrest 10/04/2016     Note Last Updated: 10/4/2016     Overview:   per Cardionet Monitor July 2013; s/p pacemaker     • Presence of cardiac pacemaker 10/04/2016   • Right-sided low back pain with right-sided sciatica 07/15/2016   • History of venous thrombosis 06/14/2016   • Bilateral carotid artery stenosis 06/14/2016     Note Last Updated: 6/14/2016     Description: total occlusion of the SOLO and stenosis of the LICA.     • Osteoporosis 06/14/2016     Note Last Updated: 6/14/2016     Description: s/p 10 years of Fosamax.     • Hyperlipidemia 06/14/2016       Current Outpatient Medications on File Prior to Visit   Medication Sig Dispense Refill   • acetaminophen (TYLENOL) 500 MG tablet Take 1 tablet by mouth Every 6 (Six) Hours As Needed for Moderate Pain. 40 tablet 0   • atorvastatin (LIPITOR) 20 MG tablet TAKE 1 TABLET BY MOUTH DAILY 90 tablet 1   • BISACODYL CO by Combination route.     • digoxin (LANOXIN) 125 MCG tablet Take 125 mcg by mouth Daily.     • HYDROcodone-acetaminophen (NORCO) 5-325 MG per tablet Take 1 tablet by mouth Every 6 (Six) Hours As Needed.     • metoprolol tartrate (LOPRESSOR) 25 MG tablet Take 1 tablet by mouth Every 12 (Twelve) Hours. 60 tablet 0   • mupirocin (BACTROBAN) 2 % ointment Apply 1 application topically to the appropriate area as directed 3 (Three) Times a Day.     • spironolactone (ALDACTONE) 25 MG tablet TAKE 1 TABLET BY MOUTH DAILY 90 tablet 3   • vitamin D (ERGOCALCIFEROL) 1.25 MG (18609 UT) capsule capsule Take 1 capsule by mouth Every 7 (Seven) Days. 5 capsule    • [DISCONTINUED] rivaroxaban (XARELTO)  "20 MG tablet Take 1 tablet by mouth Daily With Dinner. Indications: Atrial Fibrillation 30 tablet 0     No current facility-administered medications on file prior to visit.       Objective     /64 (BP Location: Right arm, Patient Position: Sitting, Cuff Size: Adult)   Pulse 90   Ht 170.2 cm (67.01\")   Wt 71.7 kg (158 lb)   SpO2 100%   BMI 24.74 kg/m²     Physical Exam  Constitutional:       Appearance: She is well-developed.   HENT:      Head: Normocephalic and atraumatic.   Pulmonary:      Effort: Pulmonary effort is normal.   Neurological:      Mental Status: She is alert and oriented to person, place, and time.   Psychiatric:         Behavior: Behavior normal.         Assessment & Plan   Diagnoses and all orders for this visit:    1. Closed comminuted intertrochanteric fracture of left femur with routine healing, subsequent encounter (Primary)    2. Osteoporosis, unspecified osteoporosis type, unspecified pathological fracture presence  -     CBC & Differential  -     Comprehensive Metabolic Panel  -     Vitamin D 25 Hydroxy  -     Ambulatory Referral to [facility] For Infusion Treatment    3. Anemia, unspecified type  -     CBC & Differential  -     Comprehensive Metabolic Panel  -     Vitamin D 25 Hydroxy    4. Current episode of major depressive disorder without prior episode, unspecified depression episode severity    Other orders  -     rivaroxaban (XARELTO) 20 MG tablet; Take 1 tablet by mouth Daily With Dinner. Indications: Atrial Fibrillation  Dispense: 90 tablet; Refill: 3  -     escitalopram (Lexapro) 10 MG tablet; Take 1 tablet by mouth Daily.  Dispense: 90 tablet; Refill: 3  -     denosumab (PROLIA) syringe 60 mg        Discussion  F/u femur fracture.  Continue home physical therapy.    OP.  I recommend to get 1200 mg of calcium and 1000 IUs of vitamin D through diet and supplements and to participate in a weight based exercise to prevent loss of bone mineral density. She is referred for Prolia " injections.    Anemia.  Check labs today.    MDD.  Start Lexapro.  Discussed with the patient onset on action and the potential side effects including nausea.  The patient will let me know of any side effects from the medication.      I spent 31 minutes caring for Brandy on this date of service. This time includes time spent by me in the following activities: preparing for the visit, reviewing tests, obtaining and/or reviewing a separately obtained history, documenting information in the medical record and independently interpreting results and communicating that information with the patient/family/caregiver.            Future Appointments   Date Time Provider Department Center   11/17/2022  1:30 PM Gene Welsh MD [facility] [address] [facility]   11/21/2022  2:15 PM Rupal Sutton MD [facility]

## 2022-10-15 LAB
25(OH)D3+25(OH)D2 SERPL-MCNC: 15.5 NG/ML (ref 30–100)
ALBUMIN SERPL-MCNC: 3.9 G/DL (ref 3.5–5.2)
ALBUMIN/GLOB SERPL: 1.3 G/DL
ALP SERPL-CCNC: 121 U/L (ref 39–117)
ALT SERPL-CCNC: 6 U/L (ref 1–33)
AST SERPL-CCNC: 15 U/L (ref 1–32)
BASOPHILS # BLD AUTO: 0.05 10*3/MM3 (ref 0–0.2)
BASOPHILS NFR BLD AUTO: 0.6 % (ref 0–1.5)
BILIRUB SERPL-MCNC: 1.5 MG/DL (ref 0–1.2)
BUN SERPL-MCNC: 12 MG/DL (ref 8–23)
BUN/CREAT SERPL: 15.6 (ref 7–25)
CALCIUM SERPL-MCNC: 9.6 MG/DL (ref 8.2–9.6)
CHLORIDE SERPL-SCNC: 98 MMOL/L (ref 98–107)
CO2 SERPL-SCNC: 21.9 MMOL/L (ref 22–29)
CREAT SERPL-MCNC: 0.77 MG/DL (ref 0.57–1)
EGFRCR SERPLBLD CKD-EPI 2021: 72.5 ML/MIN/1.73
EOSINOPHIL # BLD AUTO: 0.27 10*3/MM3 (ref 0–0.4)
EOSINOPHIL NFR BLD AUTO: 3.4 % (ref 0.3–6.2)
ERYTHROCYTE [DISTWIDTH] IN BLOOD BY AUTOMATED COUNT: 13.1 % (ref 12.3–15.4)
GLOBULIN SER CALC-MCNC: 2.9 GM/DL
GLUCOSE SERPL-MCNC: 86 MG/DL (ref 65–99)
HCT VFR BLD AUTO: 41.1 % (ref 34–46.6)
HGB BLD-MCNC: 13.1 G/DL (ref 12–15.9)
IMM GRANULOCYTES # BLD AUTO: 0.03 10*3/MM3 (ref 0–0.05)
IMM GRANULOCYTES NFR BLD AUTO: 0.4 % (ref 0–0.5)
LYMPHOCYTES # BLD AUTO: 2.56 10*3/MM3 (ref 0.7–3.1)
LYMPHOCYTES NFR BLD AUTO: 32.4 % (ref 19.6–45.3)
MCH RBC QN AUTO: 31 PG (ref 26.6–33)
MCHC RBC AUTO-ENTMCNC: 31.9 G/DL (ref 31.5–35.7)
MCV RBC AUTO: 97.2 FL (ref 79–97)
MONOCYTES # BLD AUTO: 0.59 10*3/MM3 (ref 0.1–0.9)
MONOCYTES NFR BLD AUTO: 7.5 % (ref 5–12)
NEUTROPHILS # BLD AUTO: 4.39 10*3/MM3 (ref 1.7–7)
NEUTROPHILS NFR BLD AUTO: 55.7 % (ref 42.7–76)
NRBC BLD AUTO-RTO: 0 /100 WBC (ref 0–0.2)
PLATELET # BLD AUTO: 248 10*3/MM3 (ref 140–450)
POTASSIUM SERPL-SCNC: 4.4 MMOL/L (ref 3.5–5.2)
PROT SERPL-MCNC: 6.8 G/DL (ref 6–8.5)
RBC # BLD AUTO: 4.23 10*6/MM3 (ref 3.77–5.28)
SODIUM SERPL-SCNC: 136 MMOL/L (ref 136–145)
WBC # BLD AUTO: 7.89 10*3/MM3 (ref 3.4–10.8)

## 2022-10-24 ENCOUNTER — TELEPHONE (OUTPATIENT)
Dept: INTERNAL MEDICINE | Facility: CLINIC | Age: 87
End: 2022-10-24

## 2022-10-24 NOTE — TELEPHONE ENCOUNTER
Pts daughter calling wanting to get pts most recent [facility] lab results- advised her you would give her a call back whenever you got the chance.    Best call back number [phone]

## 2022-11-17 ENCOUNTER — OFFICE VISIT (OUTPATIENT)
Dept: ORTHOPEDIC SURGERY | Facility: CLINIC | Age: 87
End: 2022-11-17

## 2022-11-17 VITALS — HEIGHT: 67 IN | WEIGHT: 158 LBS | TEMPERATURE: 97 F | BODY MASS INDEX: 24.8 KG/M2

## 2022-11-17 DIAGNOSIS — S72.142D CLOSED COMMINUTED INTERTROCHANTERIC FRACTURE OF LEFT FEMUR WITH ROUTINE HEALING, SUBSEQUENT ENCOUNTER: Primary | ICD-10-CM

## 2022-11-17 DIAGNOSIS — R52 PAIN: ICD-10-CM

## 2022-11-17 PROCEDURE — 99024 POSTOP FOLLOW-UP VISIT: CPT | Performed by: ORTHOPAEDIC SURGERY

## 2022-11-17 PROCEDURE — 73501 X-RAY EXAM HIP UNI 1 VIEW: CPT | Performed by: ORTHOPAEDIC SURGERY

## 2022-11-21 NOTE — PROGRESS NOTES
"Brandy Han     : 1930     MRN: [id number]     DATE: 2022    CC:  10 weeks status post cephalo-medullary fixation of intertrochanteric femur fracture    HPI: Patient is approximately 10 weeks out from surgery.  Reports doing well.  She has been mobilizing some denies any hip pain.  She is some soreness with increased activity but is been using a walker to mobilize.  Pain is well-controlled.  Ambulatory status is steadily improving.  Denies any new problems or concerns.    Vitals:    22 1346   Temp: 97 °F (36.1 °C)   TempSrc: Temporal   Weight: 71.7 kg (158 lb)   Height: 170.2 cm (67\")       Physical exam:  Incisions are healed.  No erythema or drainage.  Motion is improved negative Homans.  Normal motor and sensory function distally.  Good pedal pulses with brisk cap refill.      Imaging  AP  of the left hip and knee are ordered and reviewed for comparison purposes and to evaluate healing.  X-rays show alignment unchanged.     Impression:  10 weeks s/p cephalo-medullary fixation of left intertrochanteric femur fracture    Plan:    1.  Continue PT for mobilization  2.  OK to discontinue DVT prophylaxis  3.  Follow up in 6-8 weeks with repeat 2v x-rays of hip and knee.    Encouraged patient to continue to work on mobilizing and doing some exercises as she is now back home.  She had recently done some additional therapy and we get the ball rolling it appears so we do not lose momentum.  She is understanding this family is very supportive.    Gene Welsh MD    2022       "

## 2023-01-30 RX ORDER — ATORVASTATIN CALCIUM 20 MG/1
20 TABLET, FILM COATED ORAL DAILY
Qty: 90 TABLET | Refills: 1 | Status: SHIPPED | OUTPATIENT
Start: 2023-01-30

## 2023-09-12 ENCOUNTER — TELEPHONE (OUTPATIENT)
Dept: INTERNAL MEDICINE | Facility: CLINIC | Age: 88
End: 2023-09-12

## 2023-09-12 RX ORDER — ATORVASTATIN CALCIUM 20 MG/1
20 TABLET, FILM COATED ORAL DAILY
Qty: 90 TABLET | Refills: 0 | Status: SHIPPED | OUTPATIENT
Start: 2023-09-12

## 2023-09-12 NOTE — TELEPHONE ENCOUNTER
Scheduled 11/14/2023 at 2:45. Informed daughter Lisa Jacques of appointment information. Also, that Dr. Sutton would be doing labs same day. Patient to arrive 15 minutes early to be checked in and to update any information.

## 2023-10-30 RX ORDER — RIVAROXABAN 20 MG/1
TABLET, FILM COATED ORAL
Qty: 90 TABLET | Refills: 3 | Status: SHIPPED | OUTPATIENT
Start: 2023-10-30

## 2025-03-07 ENCOUNTER — TELEPHONE (OUTPATIENT)
Dept: INTERNAL MEDICINE | Facility: CLINIC | Age: OVER 89
End: 2025-03-07
Payer: MEDICARE

## 2025-03-07 NOTE — TELEPHONE ENCOUNTER
Called patients daughter Lisa Jacques (listed on the HIPAA) at [phone]. Left voice mail informing her that Mrs. Han has not been seen since 10/14/2022 and it was for a hospital follow up. Does the patient want to keep Dr. Sutton as her PCP? If so she needs to be schedule for AWV (last AWV 05/04/2021) and fasting labs prior. If not to let us know and we will remove [facility] from her care list.       Thanks,    Dianna    ----- Message from Rupal Sutton sent at 3/7/2025  1:34 PM EST -----  Is she still my patient?  ARNOLD

## (undated) DEVICE — GLV SURG BIOGEL LTX PF 8

## (undated) DEVICE — DRILL, AO SMALL: Brand: GAMMA

## (undated) DEVICE — DRSNG GZ PETROLTM XEROFORM CURAD 1X8IN STRL

## (undated) DEVICE — NEEDLE, QUINCKE, 18GX3.5": Brand: MEDLINE

## (undated) DEVICE — TRAP FLD MINIVAC MEGADYNE 100ML

## (undated) DEVICE — SUT MNCRYL 2/0 SH 27IN Y417H

## (undated) DEVICE — SUT ETHLN 3/0 PSL BLK MONO SA 30IN 1691H

## (undated) DEVICE — PK HIP PINNING 40

## (undated) DEVICE — DRSNG SURESITE WNDW 4X4.5

## (undated) DEVICE — SUT VIC 2/0 CT2 27IN J269H

## (undated) DEVICE — LAG SCREW STEP DRILL: Brand: GAMMA

## (undated) DEVICE — MAT FLR ABSORBENT LG 4FT 10 2.5FT

## (undated) DEVICE — 1010 S-DRAPE TOWEL DRAPE 10/BX: Brand: STERI-DRAPE™

## (undated) DEVICE — SPNG GZ WOVN 4X4IN 12PLY 10/BX STRL

## (undated) DEVICE — DRAPE,REIN 53X77,STERILE: Brand: MEDLINE

## (undated) DEVICE — ANES CIRC EXTENDAFLEX-LF: Brand: MEDLINE INDUSTRIES, INC.

## (undated) DEVICE — 3M™ IOBAN™ 2 ANTIMICROBIAL INCISE DRAPE 6650EZ: Brand: IOBAN™ 2

## (undated) DEVICE — ANTIBACTERIAL UNDYED BRAIDED (POLYGLACTIN 910), SYNTHETIC ABSORBABLE SUTURE: Brand: COATED VICRYL

## (undated) DEVICE — STPLR SKIN VISISTAT WD 35CT

## (undated) DEVICE — ADAPT CLIP: Brand: GAMMA

## (undated) DEVICE — K-WIRE
Type: IMPLANTABLE DEVICE | Site: FEMUR | Status: NON-FUNCTIONAL
Removed: 2022-09-09

## (undated) DEVICE — DRP C/ARMOR

## (undated) DEVICE — APPL DURAPREP IODOPHOR APL 26ML

## (undated) DEVICE — NEEDLE, QUINCKE, 20GX3.5": Brand: MEDLINE

## (undated) DEVICE — SOL ISO/ALC RUB 70PCT 4OZ

## (undated) DEVICE — GLV SURG SENSICARE PI LF PF 8 GRN STRL